# Patient Record
Sex: FEMALE | Race: WHITE | NOT HISPANIC OR LATINO | Employment: OTHER | URBAN - METROPOLITAN AREA
[De-identification: names, ages, dates, MRNs, and addresses within clinical notes are randomized per-mention and may not be internally consistent; named-entity substitution may affect disease eponyms.]

---

## 2017-01-10 ENCOUNTER — OFFICE VISIT (OUTPATIENT)
Dept: OBGYN | Facility: CLINIC | Age: 62
End: 2017-01-10
Payer: COMMERCIAL

## 2017-01-10 VITALS — SYSTOLIC BLOOD PRESSURE: 138 MMHG | DIASTOLIC BLOOD PRESSURE: 96 MMHG

## 2017-01-10 VITALS
WEIGHT: 160 LBS | SYSTOLIC BLOOD PRESSURE: 132 MMHG | HEIGHT: 67 IN | DIASTOLIC BLOOD PRESSURE: 74 MMHG | HEART RATE: 62 BPM | BODY MASS INDEX: 25.11 KG/M2

## 2017-01-10 DIAGNOSIS — Z12.11 SCREEN FOR COLON CANCER: ICD-10-CM

## 2017-01-10 DIAGNOSIS — Z01.419 ENCOUNTER FOR GYNECOLOGICAL EXAMINATION WITHOUT ABNORMAL FINDING: Primary | ICD-10-CM

## 2017-01-10 DIAGNOSIS — Z12.4 SCREENING FOR CERVICAL CANCER: ICD-10-CM

## 2017-01-10 DIAGNOSIS — Z11.51 SCREENING FOR HUMAN PAPILLOMAVIRUS: ICD-10-CM

## 2017-01-10 DIAGNOSIS — Z12.39 SCREENING FOR BREAST CANCER: ICD-10-CM

## 2017-01-10 PROCEDURE — G0145 SCR C/V CYTO,THINLAYER,RESCR: HCPCS | Performed by: OBSTETRICS & GYNECOLOGY

## 2017-01-10 PROCEDURE — 99386 PREV VISIT NEW AGE 40-64: CPT | Performed by: OBSTETRICS & GYNECOLOGY

## 2017-01-10 PROCEDURE — 87624 HPV HI-RISK TYP POOLED RSLT: CPT | Performed by: OBSTETRICS & GYNECOLOGY

## 2017-01-10 RX ORDER — TEMAZEPAM 15 MG/1
CAPSULE ORAL
COMMUNITY
End: 2022-05-18

## 2017-01-10 RX ORDER — ZIPRASIDONE HYDROCHLORIDE 20 MG/1
20 CAPSULE ORAL 2 TIMES DAILY WITH MEALS
COMMUNITY
End: 2022-05-18

## 2017-01-10 RX ORDER — LEVOTHYROXINE SODIUM 75 UG/1
75 TABLET ORAL DAILY
COMMUNITY
End: 2022-05-18

## 2017-01-10 ASSESSMENT — ANXIETY QUESTIONNAIRES
7. FEELING AFRAID AS IF SOMETHING AWFUL MIGHT HAPPEN: SEVERAL DAYS
2. NOT BEING ABLE TO STOP OR CONTROL WORRYING: NEARLY EVERY DAY
1. FEELING NERVOUS, ANXIOUS, OR ON EDGE: MORE THAN HALF THE DAYS
6. BECOMING EASILY ANNOYED OR IRRITABLE: SEVERAL DAYS
GAD7 TOTAL SCORE: 13
3. WORRYING TOO MUCH ABOUT DIFFERENT THINGS: NEARLY EVERY DAY
IF YOU CHECKED OFF ANY PROBLEMS ON THIS QUESTIONNAIRE, HOW DIFFICULT HAVE THESE PROBLEMS MADE IT FOR YOU TO DO YOUR WORK, TAKE CARE OF THINGS AT HOME, OR GET ALONG WITH OTHER PEOPLE: SOMEWHAT DIFFICULT
5. BEING SO RESTLESS THAT IT IS HARD TO SIT STILL: SEVERAL DAYS

## 2017-01-10 ASSESSMENT — PATIENT HEALTH QUESTIONNAIRE - PHQ9: 5. POOR APPETITE OR OVEREATING: MORE THAN HALF THE DAYS

## 2017-01-10 NOTE — Clinical Note
[facility]  [address]  [place] [address]  [phone]      January 18, 2017    Sonny Rush  [address]  [place] [address]    Dear Sonny,  We are happy to inform you that your PAP smear result from 1/10/17 is normal.  We are now able to do a follow up test on PAP smears. The DNA test is for HPV (Human Papilloma Virus). Cervical cancer is closely linked with certain types of HPV. Your result showed no evidence of high risk HPV. Therefore we recommend repeating your next pap smear in 5 years.  You will still need to return to the clinic every year for an annual exam and other preventive tests.  Please contact the clinic with any questions.  Sincerely,  Daniela Rodriguez MD

## 2017-01-10 NOTE — MR AVS SNAPSHOT
After Visit Summary   1/10/2017    Sonny Rush    MRN: [id number]           Patient Information     Date Of Birth          1955        Visit Information        Provider Department      1/10/2017 11:00 AM Daniela Rodriguez MD [facility]        Today's Diagnoses     Encounter for gynecological examination without abnormal finding [Z01.419]    -  1     Screening for human papillomavirus         Screening for cervical cancer         Screening for breast cancer         Screen for colon cancer            Follow-ups after your visit        Additional Services     GASTROENTEROLOGY ADULT REFERRAL  (Colonoscopy)       Your provider has referred you to Gastroenterology Services.    English    Procedure/Referral: PROCEDURE ONLY - COLONOSCOPY - Reason for procedure: Screening  [facility]: [facility]  (all patients will be contacted by GI  to schedule appointment - This includes [facility], [facility] and [facility]) - Agustina [phone]   [email or web address]      Please be aware that coverage of these services is subject to the terms and limitations of your health insurance plan.  Call member services at your health plan with any benefit or coverage questions.  Any procedures must be performed at a [facility] facility OR coordinated by your clinic's referral office.    Please bring the following with you to your appointment:    (1) Any X-Rays, CTs or MRIs which have been performed.  Contact the facility where they were done to arrange for  prior to your scheduled appointment.    (2) List of current medications   (3) This referral request   (4) Any documents/labs given to you for this referral            RADIOLOGY REFERRAL ([facility])       You have been referred to [facility] for Mammogram (Screening) Imaging.  They are located across the [place]  "from the [facility] (same building).  [address]  Phone: [phone].                  Follow-up notes from your care team     Return in about 1 year (around 1/10/2018).      Who to contact     If you have questions or need follow up information about today's clinic visit or your schedule please contact [facility] directly at [phone].  Normal or non-critical lab and imaging results will be communicated to you by [facility], letter or phone within 4 business days after the clinic has received the results. If you do not hear from us within 7 days, please contact the clinic through [facility] or phone. If you have a critical or abnormal lab result, we will notify you by phone as soon as possible.  Submit refill requests through [facility] or call your pharmacy and they will forward the refill request to us. Please allow 3 business days for your refill to be completed.          Additional Information About Your Visit        Miso Information     Miso lets you send messages to your doctor, view your test results, renew your prescriptions, schedule appointments and more. To sign up, go to [email or web address] . Click on \"Log in\" on the left side of the screen, which will take you to the Welcome page. Then click on \"Sign up Now\" on the right side of the page.     You will be asked to enter the access code listed below, as well as some personal information. Please follow the directions to create your username and password.     Your access code is: [email or web address]  Expires: 4/10/2017 12:01 PM     Your access code will  in 90 days. If you need help or a new code, please call your [facility] clinic or [phone].        Care EveryWhere ID     This is your Care EveryWhere ID. This could be used by other organizations to access your [facility] medical records  [id number]         Blood Pressure from Last 3 Encounters:   01/10/17 138/96   07/10/13 132/74   12 " 119/76    Weight from Last 3 Encounters:   07/10/13 160 lb (72.576 kg)   07/03/12 150 lb (68.04 kg)              We Performed the Following     GASTROENTEROLOGY ADULT REFERRAL  (Colonoscopy)     HPV High Risk Types DNA Cervical     Pap imaged thin layer screen with HPV - recommended age 30 - 65     RADIOLOGY REFERRAL ([facility])          Today's Medication Changes          These changes are accurate as of: 1/10/17 12:01 PM.  If you have any questions, ask your nurse or doctor.               These medicines have changed or have updated prescriptions.        Dose/Directions    VERAPAMIL HCL PO   This may have changed:  Another medication with the same name was removed. Continue taking this medication, and follow the directions you see here.        Dose:  40 mg   Take 40 mg by mouth 2 times daily   Refills:  0         Stop taking these medicines if you haven't already. Please contact your care team if you have questions.     acetaminophen 325 MG tablet   Commonly known as:  TYLENOL   Stopped by:  Daniela Rodriguez MD           HYDROcodone-acetaminophen 5-325 MG per tablet   Commonly known as:  NORCO   Stopped by:  Daniela Rodriguez MD           ibuprofen 200 MG tablet   Commonly known as:  ADVIL/MOTRIN   Stopped by:  Daniela Rodriguez MD           ONDANSETRON PO   Stopped by:  Daniela Rodriguez MD                    Primary Care Provider Office Phone # Fax #    Patricio York [phone]       [facility] [address] [facility] [address]  [facility] [address]        Thank you!     Thank you for choosing [facility]  for your care. Our goal is always to provide you with excellent care. Hearing back from our patients is one way we can continue to improve our services. Please take a few minutes to complete the written survey that you may receive in the mail after your visit with us. Thank you!             Your Updated Medication List - Protect others around you: Learn how to safely  use, store and throw away your medicines at www.disposemymeds.org.          This list is accurate as of: 1/10/17 12:01 PM.  Always use your most recent med list.                   Brand Name Dispense Instructions for use    ANTI-NAUSEA PO          ATIVAN PO      Take 0.5 mg by mouth 3 times daily as needed.       levothyroxine 75 MCG tablet    SYNTHROID/LEVOTHROID     Take 75 mcg by mouth daily       MAXALT-MLT PO      Take 10 mg by mouth as needed.       temazepam 15 MG capsule    RESTORIL     Take by mouth nightly as needed for sleep       TOPAMAX PO      Take 100 mg by mouth 2 times daily.       TRINTELLIX PO      Take 25 mg by mouth       VALTREX PO      Take 500 mg by mouth.       VERAPAMIL HCL PO      Take 40 mg by mouth 2 times daily       VISTARIL PO      Take 25 mg by mouth as needed       WELLBUTRIN PO      Take 150 mg by mouth daily 300mgER+ 150mgER       ziprasidone 20 MG capsule    GEODON     Take 20 mg by mouth 2 times daily (with meals)

## 2017-01-10 NOTE — PROGRESS NOTES
Sonny is a 61 year old  female who presents for annual exam.     Besides routine health maintenance, she has no other health concerns today .    HPI:  The patient's PCP is Patricio York MD.    Patient has history of right breast cancer  DCIS   Had lumpectomy and radiation    Declined tamoxifen  Had normal mammo in sept this years    Dealing with serious depression, has psychiatrist, on several meds   is supportive  Former patient of Dr Villavicencio      GYNECOLOGIC HISTORY:    No LMP recorded. Patient is postmenopausal.  Her current contraception method is: menopause.  She  reports that she quit smoking about 13 years ago. She does not have any smokeless tobacco history on file.    Patient is not sexually active.  STD testing offered?  Declined  Last PHQ-9 score on record =   PHQ-9 SCORE 1/10/2017   Total Score 23     Last GAD7 score on record =   SAÚL-7 SCORE 1/10/2017   Total Score 13     Alcohol Score = 4    HEALTH MAINTENANCE:  Cholesterol: 13   Total= 165, Triglycerides=48, HDL=63, LDL=92  Last Mammo: 16, Result: normal, Next Mammo: 2017  Pap: 12 neg, HPV-  Colonoscopy:  3/5/07, Result: normal, Next Colonoscopy: due this year. Will send referral.  Dexa:  4/3/08    Health maintenance updated:  yes    HISTORY:  Obstetric History       T0      TAB2   SAB0   E0   M0   L0       # Outcome Date GA Lbr Sumeet/2nd Weight Sex Delivery Anes PTL Lv   2 TAB            1 TAB                   There is no problem list on file for this patient.    Past Surgical History   Procedure Laterality Date     Ent surgery       ear tubes, rhinoplasty     Laparoscopy       Age 27 for pelvic pain     Orthopedic surgery       [facility]     Biopsy breast seed localization  7/3/2012     Procedure: BIOPSY BREAST SEED LOCALIZATION;  RIGHT BREAST LUMPECTOMY WITH ULTRASOUND SEED LOCALIZATION;  Surgeon: Jaclyn Dalal MD;  Location:  [facility]     Breast biopsy, rt/lt  00     stereotactic bx right  breast--fibrocystic change     Dilation and curettage  2/27/06     inactive endometrium on path; done for PMB      Social History   Substance Use Topics     Smoking status: Former Smoker     Quit date: 07/03/2003     Smokeless tobacco: Not on file     Alcohol Use: 0.0 oz/week     0 Standard drinks or equivalent per week      Problem (# of Occurrences) Relation (Name,Age of Onset)    Breast Cancer (1) Unspecified    Hyperlipidemia (1) Father    Hypertension (1) Father    Ovarian Cancer (1) Unspecified    Uterine Cancer (1) Unspecified            Current Outpatient Prescriptions   Medication Sig     ziprasidone (GEODON) 20 MG capsule Take 20 mg by mouth 2 times daily (with meals)     temazepam (RESTORIL) 15 MG capsule Take by mouth nightly as needed for sleep     levothyroxine (SYNTHROID/LEVOTHROID) 75 MCG tablet Take 75 mcg by mouth daily     Vortioxetine HBr (TRINTELLIX PO) Take 25 mg by mouth     Fructose-Dextrose-Phosphor Acd (ANTI-NAUSEA PO)      HydrOXYzine Pamoate (VISTARIL PO) Take 25 mg by mouth as needed      BuPROPion HCl (WELLBUTRIN PO) Take 150 mg by mouth daily 300mgER+ 150mgER     LORazepam (ATIVAN PO) Take 0.5 mg by mouth 3 times daily as needed.       Topiramate (TOPAMAX PO) Take 100 mg by mouth 2 times daily.       Rizatriptan Benzoate (MAXALT-MLT PO) Take 10 mg by mouth as needed.       ValACYclovir HCl (VALTREX PO) Take 500 mg by mouth.       VERAPAMIL HCL PO Take 40 mg by mouth 2 times daily      No current facility-administered medications for this visit.     Allergies   Allergen Reactions     Codeine Sulfate      Depakote      Niacin      Prednisone        Past medical, surgical, social and family histories were reviewed and updated in EPIC.    ROS:   12 point review of systems negative other than symptoms noted below.  Constitutional: Fatigue  Eyes: Spots  Head: Earache, Nasal Congestion and Ringing  Cardiovascular: Chest Pain  Gastrointestinal: Abdominal Pain, Bloating, Constipation, Diarrhea,  Nausea and Vomiting  Genitourinary: No Periods, Painful Urination and Vaginal Dryness  Skin: Skin Dryness  Neurologic: Dizziness and Headaches  Endocrine: Cold Intolerance and Decreased Libido  Psychiatric: Anxiety, Depression and Difficulty Sleeping    EXAM:  /96 mmHg   BMI: There is no weight on file to calculate BMI. Patient declined.    PHYSICAL EXAM:  Constitutional:  Appearance: Well nourished, well developed, alert, in no acute distress  Neck:  Lymph Nodes:  No lymphadenopathy present    Thyroid:  Gland size normal, nontender, no nodules or masses present  on palpation  Chest:  Respiratory Effort:  Breathing unlabored  Cardiovascular:    Heart: Auscultation:  Regular rate, normal rhythm, no murmurs present  Breasts: Inspection of Breasts:  No lymphadenopathy present    Palpation of Breasts and Axillae:  No masses present on palpation, no  breast tenderness    Axillary Lymph Nodes:  No lymphadenopathy present  Gastrointestinal:   Abdominal Examination:  Abdomen nontender to palpation, tone normal without rigidity or guarding, no masses present, umbilicus without lesions   Liver and Spleen:  No hepatomegaly present, liver nontender to palpation    Hernias:  No hernias present  Lymphatic: Lymph Nodes:  No other lymphadenopathy present  Skin:  General Inspection:  No rashes present, no lesions present, no areas of  discoloration    Genitalia and Groin:  No rashes present, no lesions present, no areas of  discoloration, no masses present  Neurologic/Psychiatric:    Mental Status:  Oriented X3     Pelvic Exam:  External Genitalia:     Normal appearance for age, no discharge present, no tenderness present, no inflammatory lesions present, color normal  Vagina:     Normal vaginal vault without central or paravaginal defects, no discharge present, no inflammatory lesions present, no masses present  Bladder:     Nontender to palpation  Urethra:   Urethral Body:  Urethra palpation normal, urethra structural  support normal   Urethral Meatus:  No erythema or lesions present  Cervix:     Appearance healthy, no lesions present, nontender to palpation, no bleeding present  Uterus:     Nontender to palpation, no masses present, position anteflexed, mobility: normal  Adnexa:     No adnexal tenderness present, no adnexal masses present  Perineum:     Perineum within normal limits, no evidence of trauma, no rashes or skin lesions present  Anus:     Anus within normal limits, no hemorrhoids present  Inguinal Lymph Nodes:     No lymphadenopathy present  Pubic Hair:     Normal pubic hair distribution for age  Genitalia and Groin:     No rashes present, no lesions present, no areas of discoloration, no masses present    COUNSELING:   Reviewed preventive health counseling, as reflected in patient instructions       Regular exercise       Healthy diet/nutrition    BMI: There is no weight on file to calculate BMI. Patient declined.      ASSESSMENT:  61 year old female with satisfactory annual exam.    ICD-10-CM    1. Encounter for gynecological examination without abnormal finding [Z01.419] Z01.419    2. Screening for human papillomavirus Z11.51 HPV High Risk Types DNA Cervical   3. Screening for cervical cancer Z12.4 Pap imaged thin layer screen with HPV - recommended age 30 - 65   4. Screening for breast cancer Z12.39 RADIOLOGY REFERRAL ([facility])   5. Screen for colon cancer Z12.11 GASTROENTEROLOGY ADULT REFERRAL  (Colonoscopy)       PLAN:  Needs colonoscopy  Mammogram next sept      Daniela Rodriguez MD

## 2017-01-11 ASSESSMENT — PATIENT HEALTH QUESTIONNAIRE - PHQ9: SUM OF ALL RESPONSES TO PHQ QUESTIONS 1-9: 23

## 2017-01-11 ASSESSMENT — ANXIETY QUESTIONNAIRES: GAD7 TOTAL SCORE: 13

## 2017-01-13 LAB
COPATH REPORT: NORMAL
PAP: NORMAL

## 2017-01-17 LAB
FINAL DIAGNOSIS: NORMAL
HPV HR 12 DNA CVX QL NAA+PROBE: NEGATIVE
HPV16 DNA SPEC QL NAA+PROBE: NEGATIVE
HPV18 DNA SPEC QL NAA+PROBE: NEGATIVE
SPECIMEN DESCRIPTION: NORMAL

## 2017-01-28 RX ORDER — LIDOCAINE 40 MG/G
CREAM TOPICAL
Status: CANCELLED | OUTPATIENT
Start: 2017-01-28

## 2017-01-28 RX ORDER — ONDANSETRON 2 MG/ML
4 INJECTION INTRAMUSCULAR; INTRAVENOUS
Status: CANCELLED | OUTPATIENT
Start: 2017-01-28

## 2017-01-30 ENCOUNTER — HOSPITAL ENCOUNTER (OUTPATIENT)
Facility: CLINIC | Age: 62
Discharge: HOME | End: 2017-01-30
Attending: COLON & RECTAL SURGERY | Admitting: COLON & RECTAL SURGERY
Payer: COMMERCIAL

## 2017-01-30 ENCOUNTER — SURGERY (OUTPATIENT)
Age: 62
End: 2017-01-30

## 2017-01-30 VITALS
WEIGHT: 149 LBS | OXYGEN SATURATION: 94 % | RESPIRATION RATE: 21 BRPM | BODY MASS INDEX: 23.95 KG/M2 | DIASTOLIC BLOOD PRESSURE: 91 MMHG | HEIGHT: 66 IN | SYSTOLIC BLOOD PRESSURE: 141 MMHG

## 2017-01-30 LAB — COLONOSCOPY: NORMAL

## 2017-01-30 PROCEDURE — G0500 MOD SEDAT ENDO SERVICE >5YRS: HCPCS | Performed by: COLON & RECTAL SURGERY

## 2017-01-30 PROCEDURE — 99153 MOD SED SAME PHYS/QHP EA: CPT | Performed by: COLON & RECTAL SURGERY

## 2017-01-30 PROCEDURE — 88305 TISSUE EXAM BY PATHOLOGIST: CPT | Performed by: COLON & RECTAL SURGERY

## 2017-01-30 PROCEDURE — 25000125 ZZHC RX 250: Performed by: COLON & RECTAL SURGERY

## 2017-01-30 PROCEDURE — 88305 TISSUE EXAM BY PATHOLOGIST: CPT | Mod: 26 | Performed by: COLON & RECTAL SURGERY

## 2017-01-30 PROCEDURE — 45380 COLONOSCOPY AND BIOPSY: CPT | Mod: PT | Performed by: COLON & RECTAL SURGERY

## 2017-01-30 RX ORDER — FENTANYL CITRATE 50 UG/ML
INJECTION, SOLUTION INTRAMUSCULAR; INTRAVENOUS PRN
Status: DISCONTINUED | OUTPATIENT
Start: 2017-01-30 | End: 2017-01-30 | Stop reason: HOSPADM

## 2017-01-30 RX ADMIN — FENTANYL CITRATE 100 MCG: 50 INJECTION, SOLUTION INTRAMUSCULAR; INTRAVENOUS at 08:20

## 2017-01-30 RX ADMIN — MIDAZOLAM HYDROCHLORIDE 1 MG: 1 INJECTION, SOLUTION INTRAMUSCULAR; INTRAVENOUS at 08:26

## 2017-01-30 RX ADMIN — MIDAZOLAM HYDROCHLORIDE 2 MG: 1 INJECTION, SOLUTION INTRAMUSCULAR; INTRAVENOUS at 08:20

## 2017-01-30 NOTE — H&P
Colon & Rectal Surgery History and Physical  Pre-Endoscopy Procedure Note    History of Present Illness   I have been asked by Dr. Rodriguez to evaluate this 61 year old female for colorectal cancer screening. She denies any abdominal pain, weight loss, bleeding per rectum, or recent change in bowel habits.    Past Medical History  Diagnosis Date     Anxiety      Migraines      ON NEURONTIN, sees neurologist     Malignant neoplasm (H)      Meniere's disease      Mild depression      PMB (postmenopausal bleeding) 2006     Had  D & C 2/06 showing inactive endometrium       Past Surgical History  Procedure Laterality Date     ENT surgery       ear tubes, rhinoplasty     Laparoscopy       Age 27 for pelvic pain     Orthopedic surgery       hammertoe     Biopsy breast seed localization  7/3/2012     Procedure: BIOPSY BREAST SEED LOCALIZATION;  RIGHT BREAST LUMPECTOMY WITH ULTRASOUND SEED LOCALIZATION;  Surgeon: Jaclyn Dalal MD;  Location: [facility]     Breast biopsy  11/29/00     stereotactic bx right breast--fibrocystic change     Dilation and curettage  2/27/06     inactive endometrium on path; done for PMB        Medications  Medication Sig     ziprasidone (GEODON) 20 MG capsule Take 20 mg by mouth 2 times daily (with meals)     temazepam (RESTORIL) 15 MG capsule Take by mouth nightly as needed for sleep     levothyroxine (SYNTHROID/LEVOTHROID) 75 MCG tablet Take 75 mcg by mouth daily     Vortioxetine HBr (TRINTELLIX PO) Take 25 mg by mouth     HydrOXYzine Pamoate (VISTARIL PO) Take 25 mg by mouth as needed      Topiramate (TOPAMAX PO) Take 100 mg by mouth 2 times daily.       ValACYclovir HCl (VALTREX PO) Take 500 mg by mouth.       VERAPAMIL HCL PO Take 40 mg by mouth 2 times daily      Fructose-Dextrose-Phosphor Acd (ANTI-NAUSEA PO)      BuPROPion HCl (WELLBUTRIN PO) Take 150 mg by mouth daily 300mgER+ 150mgER     LORazepam (ATIVAN PO) Take 0.5 mg by mouth 3 times daily as needed.       Rizatriptan Benzoate  "(MAXALT-MLT PO) Take 10 mg by mouth as needed.         Allergies  Allergen Reactions     Codeine Sulfate      Depakote      Niacin      Prednisone         Family History   Family history includes Breast Cancer in another family member; Hyperlipidemia and Hypertension in her father; Ovarian Cancer in another family member; Uterine Cancer in another family member.     Social History    She reports that she quit smoking about 13 years ago. She does not have any smokeless tobacco history on file. She reports that she drinks alcohol. She reports that she does not use illicit drugs.    Review of Systems   Constitutional:  No fever, weight change or fatigue.    Eyes:     No dry eyes or vision changes.   Ears/Nose/Throat/Neck:  No oral ulcers, sore throat or voice change.    Cardiovascular:   No palpitations, syncope, angina or edema.   Respiratory:    No chest pain, excessive sleepiness, shortness of breath or hemoptysis.    Gastrointestinal:   No abdominal pain, nausea, vomiting, diarrhea or heartburn.    Genitourinary:   No dysuria, hematuria, urinary retention or urinary frequency.   Musculoskeletal:  No joint swelling or arthralgias.    Dermatologic:  No skin rash or other skin changes.   Neurologic:    No focal weakness or numbness. No neuropathy.   Psychiatric:    No depression, anxiety, suicidal ideation, or paranoid ideation.   Endocrine:   No cold or heat intolerance, polydipsia, hirsutism, change in libido, or flushing.   Hematology/Lymphatic:  No bleeding or lymphadenopathy.    Allergy/Immunology:  No rhinitis or hives.     Physical Exam   Vitals:  /86, RR 16, HR 64, height 1.676 m (5' 6\"), weight 67.586 kg (149 lb), SpO2 98 %.    General:  Alert and oriented to person, place and time   Airway: Normal oropharyngeal airway and neck mobility   Lungs:  Clear bilaterally   Heart:  Regular rate and rhythm   Abdomen: Soft, NT, ND, no masses   Rectal:  Perianal skin without excoriation, hemorrhoidal disease or " anal fissure        Digital rectal examination reveals normal sphincter tone without masses    ASA Grade: II (mild systemic disease)    Impression: Cleared for use of conscious sedation for colorectal cancer screening    Plan: Proceed with colonoscopy     Muriel Wolff MD  [facility]  [phone]

## 2017-01-31 LAB — COPATH REPORT: NORMAL

## 2017-09-20 ENCOUNTER — TRANSFERRED RECORDS (OUTPATIENT)
Dept: HEALTH INFORMATION MANAGEMENT | Facility: CLINIC | Age: 62
End: 2017-09-20

## 2019-02-10 ENCOUNTER — ANCILLARY PROCEDURE (OUTPATIENT)
Dept: GENERAL RADIOLOGY | Facility: CLINIC | Age: 64
End: 2019-02-10
Attending: PHYSICIAN ASSISTANT
Payer: COMMERCIAL

## 2019-02-10 ENCOUNTER — OFFICE VISIT (OUTPATIENT)
Dept: URGENT CARE | Facility: URGENT CARE | Age: 64
End: 2019-02-10
Payer: COMMERCIAL

## 2019-02-10 VITALS
HEIGHT: 67 IN | BODY MASS INDEX: 24.8 KG/M2 | RESPIRATION RATE: 14 BRPM | SYSTOLIC BLOOD PRESSURE: 146 MMHG | TEMPERATURE: 97.6 F | WEIGHT: 158 LBS | DIASTOLIC BLOOD PRESSURE: 100 MMHG | HEART RATE: 80 BPM

## 2019-02-10 DIAGNOSIS — S92.114A CLOSED NONDISPLACED FRACTURE OF NECK OF RIGHT TALUS, INITIAL ENCOUNTER: Primary | ICD-10-CM

## 2019-02-10 DIAGNOSIS — M79.671 RIGHT FOOT PAIN: ICD-10-CM

## 2019-02-10 PROCEDURE — 99203 OFFICE O/P NEW LOW 30 MIN: CPT | Performed by: PHYSICIAN ASSISTANT

## 2019-02-10 PROCEDURE — 73630 X-RAY EXAM OF FOOT: CPT | Mod: RT

## 2019-02-10 PROCEDURE — 73610 X-RAY EXAM OF ANKLE: CPT | Mod: RT

## 2019-02-10 RX ORDER — MIRTAZAPINE 7.5 MG/1
7.5 TABLET, FILM COATED ORAL AT BEDTIME
COMMUNITY
End: 2022-08-05

## 2019-02-10 RX ORDER — ONDANSETRON 4 MG/1
4 TABLET, ORALLY DISINTEGRATING ORAL EVERY 8 HOURS PRN
COMMUNITY
End: 2023-03-17

## 2019-02-10 ASSESSMENT — MIFFLIN-ST. JEOR: SCORE: 1290.34

## 2019-02-11 NOTE — PROGRESS NOTES
SUBJECTIVE:  Chief Complaint   Patient presents with     Urgent Care     Musculoskeletal Problem     injury to right foot/ankle when she missed a step trying to avoid stepping on kitten.      Sonny Rush is a 63 year old female who presents with a chief complaint of right foot and ankle pain and swelling.  Symptoms began 2 day(s) ago, are moderate.  Context:  Injury:Yes: Patient fell down the last couple of steps at her home. She had immediate pain and was unable to bear weight on her foot following the accident.   Pain exacerbated by weight-bearing Relieved by rest and elevation.  She treated it initially with RICE treatment. This is the first time this type of injury has occurred to this patient.   Patient denies numbness, tingling or icy toes  She has had increased swelling into her ankle over the last day or so    Past Medical History:   Diagnosis Date     Anxiety      Malignant neoplasm (H)      Meniere's disease      Migraines     ON NEURONTIN, sees neurologist     Mild depression (H)     sees psych     PMB (postmenopausal bleeding) 2006    Had  D & C 2/06 showing inactive endometrium     Current Outpatient Medications   Medication Sig Dispense Refill     BuPROPion HCl (WELLBUTRIN PO) Take 150 mg by mouth daily 300mgER+ 150mgER       levothyroxine (SYNTHROID/LEVOTHROID) 75 MCG tablet Take 75 mcg by mouth daily       mirtazapine (REMERON) 7.5 MG tablet Take 7.5 mg by mouth At Bedtime       ondansetron (ZOFRAN-ODT) 4 MG ODT tab Take 4 mg by mouth every 8 hours as needed for nausea       order for DME Short walking boot 1 each 0     Topiramate (TOPAMAX PO) Take 100 mg by mouth 2 times daily.         VERAPAMIL HCL PO Take 40 mg by mouth 2 times daily        Fructose-Dextrose-Phosphor Acd (ANTI-NAUSEA PO)        HydrOXYzine Pamoate (VISTARIL PO) Take 25 mg by mouth as needed        LORazepam (ATIVAN PO) Take 0.5 mg by mouth 3 times daily as needed.         Rizatriptan Benzoate (MAXALT-MLT PO) Take 10 mg  "by mouth as needed.         temazepam (RESTORIL) 15 MG capsule Take by mouth nightly as needed for sleep       ValACYclovir HCl (VALTREX PO) Take 500 mg by mouth.         Vortioxetine HBr (TRINTELLIX PO) Take 25 mg by mouth       ziprasidone (GEODON) 20 MG capsule Take 20 mg by mouth 2 times daily (with meals)       Social History     Tobacco Use     Smoking status: Former Smoker     Last attempt to quit: 7/3/2003     Years since quitting: 15.6     Smokeless tobacco: Never Used   Substance Use Topics     Alcohol use: Yes     Alcohol/week: 0.0 oz     Family History   Problem Relation Age of Onset     Breast Cancer Unknown         aunt     Hyperlipidemia Father      Hypertension Father      Ovarian Cancer Unknown      Uterine Cancer Unknown          ROS:  Review of systems negative except as stated above.    EXAM:   BP (!) 146/100   Pulse 80   Temp 97.6  F (36.4  C) (Oral)   Resp 14   Ht 1.695 m (5' 6.75\")   Wt 71.7 kg (158 lb)   BMI 24.93 kg/m    M/S Exam:Right foot has swelling, bruising and erythema. TTP over 2nd / 3rd MT and malleolus B/L.GENERAL APPEARANCE: healthy, alert and no distress  EXTREMITIES: peripheral pulses normal. Neg Homans sign. NO calf swelling  SKIN: no suspicious lesions or rashes  NEURO: Normal strength and tone, sensory exam grossly normal, mentation intact and speech normal    X-RAY was done -- fracture of talus    ASSESSMENT / PLAN:  1. Closed nondisplaced fracture of neck of right talus, initial encounter  RICE treatment encouraged  Continue with Aleve for pain  Follow-up with Ortho in the next week for re-check  - XR Ankle Right G/E 3 Views; Future  - XR Foot Right G/E 3 Views; Future  - order for DME; Short walking boot  Dispense: 1 each; Refill: 0  - ORTHO  REFERRAL    I have discussed the patient's diagnosis and my plan of treatment with the patient. We went over any labs or imaging. Patient is aware to come back in with worsening symptoms or if no relief despite " treatment plan.  Patient verbalizes understanding. All questions were addressed and answered.   Shanti Reina PA-C

## 2019-02-11 NOTE — PATIENT INSTRUCTIONS
Patient Education     Foot Fracture  You have a broken bone (fracture) in your foot. This will cause pain, swelling, and often bruising. It will usually take about 4 to 8 weeks to heal. A foot fracture may be treated with a special shoe, splint, cast, or boot.  Home care  Follow these guidelines when caring for yourself at home:    You may be given a splint, cast, shoe, or boot to keep the injured area from moving. Unless you were told otherwise, use crutches or a walker. Don t put weight on the injured foot until your health care provider says you can do so. (You can rent crutches and a walker at many pharmacies and surgical or orthopedic supply stores.) Don t put weight on a splint, or it will break.    Keep your leg elevated to reduce pain and swelling. When sleeping, put a pillow under the injured leg. When sitting, support the injured leg so it is above your waist. This is very important during the first 2 days (48 hours).    Put an ice pack on the injured area. Do this for 20 minutes every 1 to 2 hours the first day for pain relief. You can make an ice pack by wrapping a plastic bag of ice cubes in a thin towel. As the ice melts, be careful that the splint, cast, boot, or shoe doesn t get wet. You can place the ice pack directly over the splint or cast. Unless told otherwise, you can open the boot or shoe to apply the ice pack. Continue using the ice pack 3 to 4 times a day for the next 2 days. Then use the ice pack as needed to ease pain and swelling.    Keep the splint, cast, boot, or shoe dry. When bathing, protect it with a large plastic bag, rubber-banded at the top end. If a fiberglass splint or cast or boot gets wet, you can dry it with a hair dryer. Unless told otherwise, you can take off the boot or shoe to bathe.    You may use acetaminophen or ibuprofen to control pain, unless another pain medicine was prescribed. If you have chronic liver or kidney disease, talk with your healthcare provider  before using these medicines. Also talk with your provider if you ve had a stomach ulcer or gastrointestinal bleeding.    Don t put creams or objects under the cast if you have itching.  Follow-up care  Follow up with your healthcare provider, or as advised. This is to make sure the bone is healing the way it should. If you were given a splint, it may be changed to a cast or boot at your follow-up visit.  X-rays may be taken. You will be told of any new findings that may affect your care.  When to seek medical advice  Call your healthcare provider right away if any of these occur:    The cast or splint cracks    The plaster cast or splint becomes wet or soft    The fiberglass cast or splint stays wet for more than 24 hours    Bad odor from the cast or wound fluid stains the cast    Tightness or pain under the cast or splint gets worse    Toes become swollen, cold, blue, numb, or tingly    You can t move your toes    Skin around cast or splint becomes red    Fever of 100.4 F (38 C) or higher, or as directed by your healthcare provider  Date Last Reviewed: 2/1/2017 2000-2018 [employer]. [address] [place] [address], [place] [address]. All rights reserved. This information is not intended as a substitute for professional medical care. Always follow your healthcare professional's instructions.

## 2019-02-11 NOTE — PROGRESS NOTES
[facility]   Clinic Visit s Feb 13, 2019    PCP: Patricio York      Sonny is a 63 year old female who is seen as self referral for   Chief Complaint   Patient presents with     Right Ankle - Pain       Injury: Patient describes injury as tripped over her new kitten and fell down a few stairs.        Location of Pain: right ankle and foot medial, nonradiating   Duration of Pain: 5 day(s) 2/8/19  Rating of Pain at worst: 5/10  Rating of Pain Currently: 5/10  Pain is better with: activity avoidance   Pain is worse with: walking and wearing her foot  Treatment so far consists of: boot and rest, aleve  Associated symptoms: swelling Moderate  Recent imaging completed: X-rays completed 2/10/19.  Prior History of related problems: hammer toe surgery right foot    Social History: retired     Past Medical History:   Diagnosis Date     Anxiety      Malignant neoplasm (H)      Meniere's disease      Migraines     ON NEURONTIN, sees neurologist     Mild depression (H)     sees psych     PMB (postmenopausal bleeding) 2006    Had  D & C 2/06 showing inactive endometrium         Family History   Problem Relation Age of Onset     Breast Cancer Unknown         aunt     Hyperlipidemia Father      Hypertension Father      Ovarian Cancer Unknown      Uterine Cancer Unknown        Social History     Socioeconomic History     Marital status:      Spouse name: Virgilio                                              Occupational History     Occupation:      Employer: [employer]   Tobacco Use     Smoking status: Former Smoker     Last attempt to quit: 7/3/2003     Years since quitting: 15.6     Smokeless tobacco: Never Used   Substance and Sexual Activity     Alcohol use: Yes     Alcohol/week: 0.0 oz     Drug use: No       Past Surgical History:   Procedure Laterality Date     BIOPSY BREAST SEED LOCALIZATION  7/3/2012    Procedure: BIOPSY BREAST SEED LOCALIZATION;  RIGHT BREAST LUMPECTOMY WITH  "ULTRASOUND SEED LOCALIZATION;  Surgeon: Jaclyn Dalal MD;  Location:  [facility]     BREAST BIOPSY, RT/LT  11/29/00    stereotactic bx right breast--fibrocystic change     COLONOSCOPY N/A 1/30/2017    Procedure: COMBINED COLONOSCOPY, SINGLE OR MULTIPLE BIOPSY/POLYPECTOMY BY BIOPSY;  Surgeon: Muriel Wolff MD;  Location:  GI     DILATION AND CURETTAGE  2/27/06    inactive endometrium on path; done for PMB     ENT SURGERY      ear tubes, rhinoplasty     LAPAROSCOPY      Age 27 for pelvic pain     ORTHOPEDIC SURGERY      [facility]       Review of Systems   Musculoskeletal: Positive for joint pain.   All other systems reviewed and are negative.        Physical Exam  BP (!) 162/100   Ht 1.695 m (5' 6.75\")   Wt 71.7 kg (158 lb)   BMI 24.93 kg/m    Constitutional:well-developed, well-nourished, and in no distress.   Cardiovascular: Intact distal pulses.    Neurological: alert. Gait Abnormal:   Antalgic gait  Skin: Skin is warm and dry.   Psychiatric: Mood and affect normal.   Respiratory: unlabored, speaks in full sentences  Lymph: no LAD, no lymphangitis            Right Ankle Exam     Tenderness   Right ankle tenderness location: Proximal dorsal foot just distal to tibiotalar junction.    Range of Motion   Dorsiflexion: 0   Plantar flexion: 20   Eversion: 5   Inversion: 10     Tests   Anterior drawer: positive  Varus tilt: positive    Other   Erythema: absent  Sensation: normal  Pulse: present     Comments:  Diffuse mottled bruising and swelling over the foot, extending into the toes.  Good capillary refill, distal pulses intact, toes are warm and well-perfused.  Sensation intact.               X-ray images Previously done and independently reviewed by me in the office today with the patient. X-ray shows:     Recent Results (from the past 744 hour(s))   XR Ankle Right G/E 3 Views    Narrative    FOOT RIGHT THREE OR MORE VIEWS, ANKLE RIGHT THREE OR MORE VIEWS  2/10/2019 7:13 PM     HISTORY: Foot and ankle " pain after an injury.    COMPARISON: None.      Impression    IMPRESSION:     Right ankle: There is a small sliver-like bony fragment projecting  dorsal to the talar neck consistent with a small avulsion fracture. If  the patient has tenderness in this region the fracture is likely  acute. No other acute appearing bony abnormality. Soft tissue swelling  about the ankle.    Right foot: No acute bony abnormality or significant degenerative  changes. Diffuse dorsal midfoot and forefoot soft tissue swelling.    SHA DORSEY MD   XR Foot Right G/E 3 Views    Narrative    FOOT RIGHT THREE OR MORE VIEWS, ANKLE RIGHT THREE OR MORE VIEWS  2/10/2019 7:13 PM     HISTORY: Foot and ankle pain after an injury.    COMPARISON: None.      Impression    IMPRESSION:     Right ankle: There is a small sliver-like bony fragment projecting  dorsal to the talar neck consistent with a small avulsion fracture. If  the patient has tenderness in this region the fracture is likely  acute. No other acute appearing bony abnormality. Soft tissue swelling  about the ankle.    Right foot: No acute bony abnormality or significant degenerative  changes. Diffuse dorsal midfoot and forefoot soft tissue swelling.    SHA DORSEY MD     ASSESSMENT/PLAN    ICD-10-CM    1. Elevated blood pressure reading without diagnosis of hypertension R03.0    2. Closed displaced avulsion fracture of right talus, initial encounter S92.151A      Small, mildly displaced avulsion fracture, anticipate satisfactory healing, encouraged to wear boot for ambulation as needed, elevation and cold packs to reduce swelling for the first week recommended.  Declines pain medication.  Range of motion exercises discussed.  No driving until able to move her foot comfortably.  Recheck with repeat x-rays in 3 weeks      Paddy to follow up with Primary Care provider regarding elevated blood pressure.

## 2019-02-13 ENCOUNTER — OFFICE VISIT (OUTPATIENT)
Dept: ORTHOPEDICS | Facility: CLINIC | Age: 64
End: 2019-02-13
Payer: COMMERCIAL

## 2019-02-13 VITALS
HEIGHT: 67 IN | SYSTOLIC BLOOD PRESSURE: 162 MMHG | BODY MASS INDEX: 24.8 KG/M2 | DIASTOLIC BLOOD PRESSURE: 100 MMHG | WEIGHT: 158 LBS

## 2019-02-13 DIAGNOSIS — S92.151A CLOSED DISPLACED AVULSION FRACTURE OF RIGHT TALUS, INITIAL ENCOUNTER: ICD-10-CM

## 2019-02-13 DIAGNOSIS — R03.0 ELEVATED BLOOD PRESSURE READING WITHOUT DIAGNOSIS OF HYPERTENSION: Primary | ICD-10-CM

## 2019-02-13 PROCEDURE — 99203 OFFICE O/P NEW LOW 30 MIN: CPT | Mod: 57 | Performed by: FAMILY MEDICINE

## 2019-02-13 PROCEDURE — 28430 CLTX TALUS FRACTURE W/O MNPJ: CPT | Mod: RT | Performed by: FAMILY MEDICINE

## 2019-02-13 ASSESSMENT — MIFFLIN-ST. JEOR: SCORE: 1290.34

## 2019-02-13 NOTE — LETTER
2/13/2019         RE: Sonny Rush  [address]  [facility] [address]        Dear Colleague,    Thank you for referring your patient, Sonny Rush, to the  SPORTS MEDICINE. Please see a copy of my visit note below.    [facility]   Clinic Visit s Feb 13, 2019    PCP: Patricio York      Sonny is a 63 year old female who is seen as self referral for   Chief Complaint   Patient presents with     Right Ankle - Pain       Injury: Patient describes injury as tripped over her new kitten and fell down a few stairs.        Location of Pain: right ankle and foot medial, nonradiating   Duration of Pain: 5 day(s) 2/8/19  Rating of Pain at worst: 5/10  Rating of Pain Currently: 5/10  Pain is better with: activity avoidance   Pain is worse with: walking and wearing her foot  Treatment so far consists of: boot and rest, aleve  Associated symptoms: swelling Moderate  Recent imaging completed: X-rays completed 2/10/19.  Prior History of related problems: hammer toe surgery right foot    Social History: retired     Past Medical History:   Diagnosis Date     Anxiety      Malignant neoplasm (H)      Meniere's disease      Migraines     ON NEURONTIN, sees neurologist     Mild depression (H)     sees psych     PMB (postmenopausal bleeding) 2006    Had  D & C 2/06 showing inactive endometrium         Family History   Problem Relation Age of Onset     Breast Cancer Unknown         aunt     Hyperlipidemia Father      Hypertension Father      Ovarian Cancer Unknown      Uterine Cancer Unknown        Social History     Socioeconomic History     Marital status:      Spouse name: Virgilio                                              Occupational History     Occupation:      Employer: [employer]   Tobacco Use     Smoking status: Former Smoker     Last attempt to quit: 7/3/2003     Years since quitting: 15.6     Smokeless tobacco: Never Used   Substance and Sexual  "Activity     Alcohol use: Yes     Alcohol/week: 0.0 oz     Drug use: No       Past Surgical History:   Procedure Laterality Date     BIOPSY BREAST SEED LOCALIZATION  7/3/2012    Procedure: BIOPSY BREAST SEED LOCALIZATION;  RIGHT BREAST LUMPECTOMY WITH ULTRASOUND SEED LOCALIZATION;  Surgeon: Jaclyn Dalal MD;  Location:  [facility]     BREAST BIOPSY, RT/LT  11/29/00    stereotactic bx right breast--fibrocystic change     COLONOSCOPY N/A 1/30/2017    Procedure: COMBINED COLONOSCOPY, SINGLE OR MULTIPLE BIOPSY/POLYPECTOMY BY BIOPSY;  Surgeon: Muriel Wolff MD;  Location:  GI     DILATION AND CURETTAGE  2/27/06    inactive endometrium on path; done for PMB     ENT SURGERY      ear tubes, rhinoplasty     LAPAROSCOPY      Age 27 for pelvic pain     ORTHOPEDIC SURGERY      [facility]       Review of Systems   Musculoskeletal: Positive for joint pain.   All other systems reviewed and are negative.        Physical Exam  BP (!) 162/100   Ht 1.695 m (5' 6.75\")   Wt 71.7 kg (158 lb)   BMI 24.93 kg/m     Constitutional:well-developed, well-nourished, and in no distress.   Cardiovascular: Intact distal pulses.    Neurological: alert. Gait Abnormal:   Antalgic gait  Skin: Skin is warm and dry.   Psychiatric: Mood and affect normal.   Respiratory: unlabored, speaks in full sentences  Lymph: no LAD, no lymphangitis            Right Ankle Exam     Tenderness   Right ankle tenderness location: Proximal dorsal foot just distal to tibiotalar junction.    Range of Motion   Dorsiflexion: 0   Plantar flexion: 20   Eversion: 5   Inversion: 10     Tests   Anterior drawer: positive  Varus tilt: positive    Other   Erythema: absent  Sensation: normal  Pulse: present     Comments:  Diffuse mottled bruising and swelling over the foot, extending into the toes.  Good capillary refill, distal pulses intact, toes are warm and well-perfused.  Sensation intact.               X-ray images Previously done and independently reviewed by me in " the office today with the patient. X-ray shows:     Recent Results (from the past 744 hour(s))   XR Ankle Right G/E 3 Views    Narrative    FOOT RIGHT THREE OR MORE VIEWS, ANKLE RIGHT THREE OR MORE VIEWS  2/10/2019 7:13 PM     HISTORY: Foot and ankle pain after an injury.    COMPARISON: None.      Impression    IMPRESSION:     Right ankle: There is a small sliver-like bony fragment projecting  dorsal to the talar neck consistent with a small avulsion fracture. If  the patient has tenderness in this region the fracture is likely  acute. No other acute appearing bony abnormality. Soft tissue swelling  about the ankle.    Right foot: No acute bony abnormality or significant degenerative  changes. Diffuse dorsal midfoot and forefoot soft tissue swelling.    SHA DORSEY MD   XR Foot Right G/E 3 Views    Narrative    FOOT RIGHT THREE OR MORE VIEWS, ANKLE RIGHT THREE OR MORE VIEWS  2/10/2019 7:13 PM     HISTORY: Foot and ankle pain after an injury.    COMPARISON: None.      Impression    IMPRESSION:     Right ankle: There is a small sliver-like bony fragment projecting  dorsal to the talar neck consistent with a small avulsion fracture. If  the patient has tenderness in this region the fracture is likely  acute. No other acute appearing bony abnormality. Soft tissue swelling  about the ankle.    Right foot: No acute bony abnormality or significant degenerative  changes. Diffuse dorsal midfoot and forefoot soft tissue swelling.    SHA DORSEY MD     ASSESSMENT/PLAN    ICD-10-CM    1. Elevated blood pressure reading without diagnosis of hypertension R03.0    2. Closed displaced avulsion fracture of right talus, initial encounter S92.151A      Small, mildly displaced avulsion fracture, anticipate satisfactory healing, encouraged to wear boot for ambulation as needed, elevation and cold packs to reduce swelling for the first week recommended.  Declines pain medication.  Range of motion exercises discussed.  No driving  until able to move her foot comfortably.  Recheck with repeat x-rays in 3 weeks      Paddy to follow up with Primary Care provider regarding elevated blood pressure.    Again, thank you for allowing me to participate in the care of your patient.        Sincerely,        Warren Herrera MD

## 2019-03-04 ENCOUNTER — ANCILLARY PROCEDURE (OUTPATIENT)
Dept: GENERAL RADIOLOGY | Facility: CLINIC | Age: 64
End: 2019-03-04
Attending: FAMILY MEDICINE
Payer: COMMERCIAL

## 2019-03-04 ENCOUNTER — OFFICE VISIT (OUTPATIENT)
Dept: ORTHOPEDICS | Facility: CLINIC | Age: 64
End: 2019-03-04
Payer: COMMERCIAL

## 2019-03-04 VITALS
HEIGHT: 67 IN | SYSTOLIC BLOOD PRESSURE: 152 MMHG | DIASTOLIC BLOOD PRESSURE: 97 MMHG | BODY MASS INDEX: 24.8 KG/M2 | WEIGHT: 158 LBS

## 2019-03-04 DIAGNOSIS — S92.151A CLOSED DISPLACED AVULSION FRACTURE OF RIGHT TALUS, INITIAL ENCOUNTER: Primary | ICD-10-CM

## 2019-03-04 PROCEDURE — 99207 ZZC FRACTURE CARE IN GLOBAL PERIOD: CPT | Performed by: FAMILY MEDICINE

## 2019-03-04 PROCEDURE — 73610 X-RAY EXAM OF ANKLE: CPT | Mod: RT | Performed by: FAMILY MEDICINE

## 2019-03-04 ASSESSMENT — MIFFLIN-ST. JEOR: SCORE: 1290.34

## 2019-03-04 NOTE — LETTER
3/4/2019         RE: Sonny Rush  [address]  [facility] [address]        Dear Colleague,    Thank you for referring your patient, Sonny Rush, to the  SPORTS MEDICINE. Please see a copy of my visit note below.    HPI   [facility]   Clinic Visit s Mar 4, 2019    PCP: Patricio York    Subjective:  Sonny Rush is a 63 year old female who is seen today for follow up of Closed displaced avulsion fracture of right talus, initial encounter. Injury occurred on February / 08 / 2019, (3  week(s) ago); her last visit was 2/13/2019.  She has been in a full leg walking boot and also a compression sleeve. She reports wearing the boot on and off since last visit.  Sonny Rush is alone today     Patient has swelling that worsens with physical activity; denies paresthesias, or weakness. Patient did express concern about healing thus far.   Patient's past medical, surgical, social and family histories are reviewed today in the medical record.    History from previous visit on 2/13/2019  Injury: Patient describes injury as tripped over her new kitten and fell down a few stairs.        Location of Pain: right ankle and foot medial, nonradiating   Duration of Pain: 5 day(s) 2/8/19  Rating of Pain at worst: 5/10  Rating of Pain Currently: 5/10  Pain is better with: activity avoidance   Pain is worse with: walking and wearing her foot  Treatment so far consists of: boot and rest, aleve  Associated symptoms: swelling Moderate  Recent imaging completed: X-rays completed 2/10/19.  Prior History of related problems: hammer toe surgery right foot    Social History: retired     Past Medical History:   Diagnosis Date     Anxiety      Malignant neoplasm (H)      Meniere's disease      Migraines     ON NEURONTIN, sees neurologist     Mild depression (H)     sees psych     PMB (postmenopausal bleeding) 2006    Had  D & C 2/06 showing inactive endometrium  "        Family History   Problem Relation Age of Onset     Breast Cancer Unknown         aunt     Hyperlipidemia Father      Hypertension Father      Ovarian Cancer Unknown      Uterine Cancer Unknown        Social History     Socioeconomic History     Marital status:      Spouse name: Virgilio                                              Occupational History     Occupation:      Employer: [employer]   Tobacco Use     Smoking status: Former Smoker     Last attempt to quit: 7/3/2003     Years since quitting: 15.6     Smokeless tobacco: Never Used   Substance and Sexual Activity     Alcohol use: Yes     Alcohol/week: 0.0 oz     Drug use: No       Past Surgical History:   Procedure Laterality Date     BIOPSY BREAST SEED LOCALIZATION  7/3/2012    Procedure: BIOPSY BREAST SEED LOCALIZATION;  RIGHT BREAST LUMPECTOMY WITH ULTRASOUND SEED LOCALIZATION;  Surgeon: Jaclyn Dalal MD;  Location:  [facility]     BREAST BIOPSY, RT/LT  11/29/00    stereotactic bx right breast--fibrocystic change     COLONOSCOPY N/A 1/30/2017    Procedure: COMBINED COLONOSCOPY, SINGLE OR MULTIPLE BIOPSY/POLYPECTOMY BY BIOPSY;  Surgeon: Muriel Wolff MD;  Location:  GI     DILATION AND CURETTAGE  2/27/06    inactive endometrium on path; done for PMB     ENT SURGERY      ear tubes, rhinoplasty     LAPAROSCOPY      Age 27 for pelvic pain     ORTHOPEDIC SURGERY      [facility]       Review of Systems   Musculoskeletal: Positive for joint pain.   All other systems reviewed and are negative.        Physical Exam  BP (!) 152/97   Ht 1.695 m (5' 6.75\")   Wt 71.7 kg (158 lb)   BMI 24.93 kg/m     Constitutional:well-developed, well-nourished, and in no distress.   Cardiovascular: Intact distal pulses.    Neurological: alert. Gait Abnormal:   Antalgic gait  Skin: Skin is warm and dry.   Psychiatric: Mood and affect normal.   Respiratory: unlabored, speaks in full sentences  Lymph: no LAD, no lymphangitis            Right Ankle Exam "     Tenderness   Right ankle tenderness location: Proximal dorsal foot just distal to tibiotalar junction.  Swelling: mild    Range of Motion   Dorsiflexion: 10   Plantar flexion: 20   Eversion: 5   Inversion: 10     Muscle Strength   Dorsiflexion:  5/5  Plantar flexion:  5/5    Tests   Anterior drawer: positive  Varus tilt: positive    Other   Erythema: absent  Sensation: normal  Pulse: present     Comments:  Improved but persistent swelling.               X-ray images repeated today and independently reviewed by me in the office today with the patient. X-ray shows:     Recent Results (from the past 744 hour(s))   XR Ankle Right G/E 3 Views    Narrative    FOOT RIGHT THREE OR MORE VIEWS, ANKLE RIGHT THREE OR MORE VIEWS  2/10/2019 7:13 PM     HISTORY: Foot and ankle pain after an injury.    COMPARISON: None.      Impression    IMPRESSION:     Right ankle: There is a small sliver-like bony fragment projecting  dorsal to the talar neck consistent with a small avulsion fracture. If  the patient has tenderness in this region the fracture is likely  acute. No other acute appearing bony abnormality. Soft tissue swelling  about the ankle.    Right foot: No acute bony abnormality or significant degenerative  changes. Diffuse dorsal midfoot and forefoot soft tissue swelling.    SHA DORSEY MD   XR Foot Right G/E 3 Views    Narrative    FOOT RIGHT THREE OR MORE VIEWS, ANKLE RIGHT THREE OR MORE VIEWS  2/10/2019 7:13 PM     HISTORY: Foot and ankle pain after an injury.    COMPARISON: None.      Impression    IMPRESSION:     Right ankle: There is a small sliver-like bony fragment projecting  dorsal to the talar neck consistent with a small avulsion fracture. If  the patient has tenderness in this region the fracture is likely  acute. No other acute appearing bony abnormality. Soft tissue swelling  about the ankle.    Right foot: No acute bony abnormality or significant degenerative  changes. Diffuse dorsal midfoot and  forefoot soft tissue swelling.    SHA DORSEY MD   XR Ankle Right G/E 3 Views    Narrative    3/4/2019    Unchanged talar neck avulsion fracture with persistent though mildly   improved soft tissue swelling also present.  No new fractures.  Mortise is   intact.     ASSESSMENT/PLAN    ICD-10-CM    1. Closed displaced avulsion fracture of right talus, initial encounter S92.151A XR Ankle Right G/E 3 Views     Persistent pain, mildly improved, slightly improved swelling, encouraged her to use the boot more regularly for pain control.  X-rays noted as essentially unchanged though with less swelling, and patient is cautioned that this can be permanent in its appearance but evulsion can still heal quite well.  Reinforced use of boot to keep foot at 90 degrees of flexion at the ankle.  Encouraged cold packs 10-15 minutes 3 times daily.  Recheck in 2-3 weeks.  X-ray does not need to be repeated unless symptoms worsen  Paddy to follow up with Primary Care provider regarding elevated blood pressure.    Again, thank you for allowing me to participate in the care of your patient.        Sincerely,        Warren Herrera MD

## 2020-01-21 NOTE — NURSING NOTE
Paddy to follow up with Primary Care provider regarding elevated blood pressure.     Finasteride Counseling:  I discussed with the patient the risks of use of finasteride including but not limited to decreased libido, decreased ejaculate volume, gynecomastia, and depression. Women should not handle medication.  All of the patient's questions and concerns were addressed. Finasteride Male Counseling: Finasteride Counseling:  I discussed with the patient the risks of use of finasteride including but not limited to decreased libido, decreased ejaculate volume, gynecomastia, and depression. Women should not handle medication.  All of the patient's questions and concerns were addressed.

## 2022-03-30 ENCOUNTER — TRANSFERRED RECORDS (OUTPATIENT)
Dept: HEALTH INFORMATION MANAGEMENT | Facility: CLINIC | Age: 67
End: 2022-03-30

## 2022-03-30 ENCOUNTER — MEDICAL CORRESPONDENCE (OUTPATIENT)
Dept: HEALTH INFORMATION MANAGEMENT | Facility: CLINIC | Age: 67
End: 2022-03-30
Payer: COMMERCIAL

## 2022-03-30 DIAGNOSIS — I10 UNCONTROLLED HYPERTENSION: ICD-10-CM

## 2022-03-30 DIAGNOSIS — R00.2 PALPITATIONS: Primary | ICD-10-CM

## 2022-05-11 ENCOUNTER — HOSPITAL ENCOUNTER (OUTPATIENT)
Dept: CARDIOLOGY | Facility: CLINIC | Age: 67
Discharge: HOME | End: 2022-05-11
Attending: PHYSICIAN ASSISTANT | Admitting: PHYSICIAN ASSISTANT
Payer: COMMERCIAL

## 2022-05-11 DIAGNOSIS — R00.2 PALPITATIONS: ICD-10-CM

## 2022-05-11 DIAGNOSIS — I10 UNCONTROLLED HYPERTENSION: ICD-10-CM

## 2022-05-11 LAB — LVEF ECHO: NORMAL

## 2022-05-11 PROCEDURE — 93306 TTE W/DOPPLER COMPLETE: CPT | Mod: 26 | Performed by: INTERNAL MEDICINE

## 2022-05-11 PROCEDURE — 255N000002 HC RX 255 OP 636: Performed by: INTERNAL MEDICINE

## 2022-05-11 PROCEDURE — 999N000208 ECHOCARDIOGRAM COMPLETE

## 2022-05-11 RX ADMIN — HUMAN ALBUMIN MICROSPHERES AND PERFLUTREN 3 ML: 10; .22 INJECTION, SOLUTION INTRAVENOUS at 08:25

## 2022-05-18 ENCOUNTER — OFFICE VISIT (OUTPATIENT)
Dept: PHARMACY | Facility: PHYSICIAN GROUP | Age: 67
End: 2022-05-18
Payer: COMMERCIAL

## 2022-05-18 ENCOUNTER — TRANSFERRED RECORDS (OUTPATIENT)
Dept: HEALTH INFORMATION MANAGEMENT | Facility: CLINIC | Age: 67
End: 2022-05-18

## 2022-05-18 VITALS
HEIGHT: 66 IN | SYSTOLIC BLOOD PRESSURE: 120 MMHG | DIASTOLIC BLOOD PRESSURE: 72 MMHG | WEIGHT: 164 LBS | BODY MASS INDEX: 26.36 KG/M2 | HEART RATE: 86 BPM

## 2022-05-18 DIAGNOSIS — I10 ESSENTIAL HYPERTENSION: Primary | ICD-10-CM

## 2022-05-18 DIAGNOSIS — G43.919 INTRACTABLE MIGRAINE WITHOUT STATUS MIGRAINOSUS, UNSPECIFIED MIGRAINE TYPE: ICD-10-CM

## 2022-05-18 DIAGNOSIS — F41.8 DEPRESSION WITH ANXIETY: ICD-10-CM

## 2022-05-18 DIAGNOSIS — E03.9 HYPOTHYROIDISM, UNSPECIFIED TYPE: ICD-10-CM

## 2022-05-18 PROCEDURE — 99207 PR NO CHARGE LOS: CPT | Performed by: PHARMACIST

## 2022-05-18 RX ORDER — VALACYCLOVIR HYDROCHLORIDE 500 MG/1
500 TABLET, FILM COATED ORAL PRN
COMMUNITY
End: 2023-03-17

## 2022-05-18 RX ORDER — BUPROPION HYDROCHLORIDE 150 MG/1
150 TABLET ORAL EVERY MORNING
COMMUNITY
Start: 2022-05-16

## 2022-05-18 RX ORDER — RIZATRIPTAN BENZOATE 10 MG/1
1 TABLET, ORALLY DISINTEGRATING ORAL PRN
COMMUNITY
Start: 2022-04-23

## 2022-05-18 RX ORDER — LEVOTHYROXINE SODIUM 88 UG/1
88 TABLET ORAL DAILY
COMMUNITY

## 2022-05-18 RX ORDER — TOPIRAMATE 25 MG/1
75 TABLET, FILM COATED ORAL DAILY
COMMUNITY
Start: 2022-04-25 | End: 2022-05-18

## 2022-05-18 RX ORDER — ONDANSETRON 8 MG/1
TABLET, ORALLY DISINTEGRATING ORAL
COMMUNITY
Start: 2022-04-25 | End: 2022-05-18

## 2022-05-18 RX ORDER — SPIRONOLACTONE 25 MG/1
1 TABLET ORAL 2 TIMES DAILY
COMMUNITY
Start: 2022-05-02 | End: 2022-06-13

## 2022-05-18 RX ORDER — VERAPAMIL HYDROCHLORIDE 240 MG/1
1 CAPSULE, EXTENDED RELEASE ORAL PRN
COMMUNITY
Start: 2022-04-11 | End: 2022-07-26

## 2022-05-18 RX ORDER — LORAZEPAM 0.5 MG/1
0.5 TABLET ORAL EVERY 8 HOURS PRN
COMMUNITY
End: 2023-02-09 | Stop reason: ALTCHOICE

## 2022-05-18 RX ORDER — LOSARTAN POTASSIUM 100 MG/1
1 TABLET ORAL DAILY
COMMUNITY
Start: 2022-05-04 | End: 2022-05-18 | Stop reason: ALTCHOICE

## 2022-05-18 RX ORDER — TIZANIDINE 2 MG/1
1-3 TABLET ORAL
COMMUNITY
Start: 2022-02-01

## 2022-05-18 RX ORDER — OLMESARTAN MEDOXOMIL AND HYDROCHLOROTHIAZIDE 40/25 40; 25 MG/1; MG/1
1 TABLET ORAL DAILY
COMMUNITY
End: 2022-06-13

## 2022-05-18 NOTE — PROGRESS NOTES
Medication Therapy Management (MTM) Encounter    ASSESSMENT:                            Medication Adherence/Access: No issues identified    Hypertension: Patient's home monitoring is not typically  meeting blood pressure goal of < 130/80mmHg. Patient would benefit from the following changes - switching ARB to olmesartan which may offer slightly stronger BP lowering vs losartan. Additionally, suggest adding the lower dose hydrochlorothiazide 12.5mg daily since on the lower spironolactone dose and help keep the potassium from going to high with the combination of ARB with spironolactone. Discussed simplicity of regimen and suggest combination pill of olmesartan-hydrochlorothiazide 40/12.5mg 1 daily in the AM to replace losartan.     Hypothyroidism: Stable.    Migraine: Stable at this time, discussed option of trial CGRP therapy instead of going back to topiramate in the future if migraine frequency increases.     Depression/Anxiety: Stable. Discussed costs of mirtazapine can vary with tablet size and can be easily split if the cost is high at the 7.5mg strength.     PLAN:                            1. Stop losartan, start olmesartan/hctz 40-12.5mg 1 daily in the morning.     2. Continue spironolactone 1 daily in the morning and verapamil 240mg at night.    3. Let me know if the mirtazapine is expensive at the lower tablet size.     4. BMP today and will recheck again in 4 weeks with medication changes.    Follow-up: Return in 4 weeks (on 6/15/2022) for MTM visit in clinic, Lab Work.    SUBJECTIVE/OBJECTIVE:                          Sonny Rush is a 66 year old female coming in for an initial visit. She was referred to me from Dr. York.      Reason for visit: high blood pressure.    Allergies/ADRs: Reviewed in chart  Past Medical History: Reviewed in chart  Tobacco: She reports that she quit smoking about 18 years ago. She has never used smokeless tobacco.  Alcohol: not currently using    Medication  Adherence/Access: no issues reported    Hypertension: Current medications include losartan 100mg daily, spironolactone 25mg daily (AM only, stopped 2nd dose due to not feeling well and having frequent urination) and verapamil ER 240mg daily at bedtime (for migraine prevention as well).  Patient does self-monitor blood pressure. Patient reports the following medication side effects: orthostatic lightheadedness- specifically the day her BP was down to 102/55mmHg.  Spironolactone was added in place of chlorthalidone, first visit since this switch was made.   Per chart review- hydrochlorothiazide has worked well in the past, but caused headaches.  Does not want to take anything that could make her more tired or impact her depression.  Drinking multiple glasses of water during the day.   Goes to Yoga in the morning.   Last BMP in March showed jump in Scr to 1.56, with borderline low sodium 133mg/dl, had not been rechecked yet. Had been on chlorthalidone 25mg daily at the time of the last BMP. Scr has ranged 1.27, 1.43, 1.19, 1.56 over the year.    BP Readings from Last 1 Encounters:   05/18/22 120/72     150/84mmHg this AM  165/90 p 90  147/86 p 96  146/91 p 80  164/86 p 86  128/79 p 60 (4pm)  111/63 p 78 (noon)  102/55 p 72 (AM)  126/80 p 93  129/80 p 92  150/90 p 95  151/92 p 103    Hypothyroidism: Patient is taking levothyroxine 88 mcg daily. Patient is having the following symptoms: none.   Last TSH WNL in March 2022.     Migraine: Sees  Neurology due again in July. Chronic migraine with and without visual aura, intractable. Migraine with vertigo  Taking verapamil 240mg daily for prevention and using Maxalt for abortive medication.  (has weaned off the topiramate now and doing well). Has Zofran for nausea if needed.  Headaches almost daily that she manages by breathing and other non-pharm interventions, mostly loud sensation and annoyance vs more pain like the migraines. Migraines 2-3 times per month, which is  "improved from 2+per week.   Has tried botox which was not helpful.   Using tizanidine 2mg -4mg at bedtime for muscle spasms.   Neurology appears to have ordered Ajovy in Oct 2018, however she did not start this.   Other past trials from neurology note includes- Depakote, Topamax 200 mg, verapamil 120 mg, amitriptyline, nortriptyline, gabapentin (600 mg three times a day), Vivactil, Keppra, Zanaflex. Cymbalta (could not tolerate). Most of these treatments, she tried for a month or two and discontinued because of intolerance. Prior acute treatments: DHE inj and spray, Axert, Imitrex, Maxalt, Relpax, Soma, Zomig, stadol, Percocet, Phrenilin and muscle relaxants: Zanaflex and Flexeril. Anti-nausea treatments: Zofran, Compazine, and Vistaril. She has had trigger point injections, occipital nerve blocks, and cervical epidural injections done through pain specialists at [facility].     Depression/Anxiety: Currently taking bupropion ER 150mg once daily in the AM and mirtazapine 7.5mg once daily at bedtime. Has been able to decrease bupropion from 300mg in hopes of having less impact on blood pressure. Mood has been stable and tolerating the dose decrease.  Dose have as needed lorazepam 0.5mg tablets and using 1x per month about.       Today's Vitals: /72   Pulse 86   Ht 5' 6\" (1.676 m)   Wt 164 lb (74.4 kg)   BMI 26.47 kg/m       ----------------    I spent 35 minutes with this patient today (an extra 15 minutes was spent creating the Medication Action Plan). All changes were made via collaborative practice agreement with Kamla Wang PA-C. A copy of the visit note was provided to the patient's provider(s).    The patient was given a summary of these recommendations.     Alida Borjas, Pharm.D, [facility]  Medication Therapy Management Pharmacist  [phone]       Medication Therapy Recommendations  Essential hypertension    Current Medication: losartan (COZAAR) 100 MG tablet (Discontinued)   Rationale: More effective " medication available - Ineffective medication - Effectiveness   Recommendation: Change Medication - OLMESARTAN-AMLODIPINE-HCTZ PO - start 1 daily in the AM   Status: Accepted per CPA          Current Medication: spironolactone (ALDACTONE) 25 MG tablet   Rationale: Medication requires monitoring - Needs additional monitoring - Safety   Recommendation: Order Lab - BMP today   Status: Accepted per CPA

## 2022-05-18 NOTE — LETTER
May 18, 2022  Sonny Saldivaron  [address]  [facility] [place] [address]    Dear Ms. Victor Manuel, [facility]        Thank you for talking with me on May 18, 2022 about your health and medications. As a follow-up to our conversation, I have included two documents:      1. Your Recommended To-Do List has steps you should take to get the best results from your medications.  2. Your Medication List will help you keep track of your medications and how to take them.    If you want to talk about these documents, please call Alida Borjas RPH at phone: [phone], Monday-Friday 8-4:30pm.    I look forward to working with you and your doctors to make sure your medications work well for you.    Sincerely,    Alida Borjas RPH  [facility] Pharmacist, [employer]

## 2022-05-18 NOTE — LETTER
"Recommended To-Do List      Prepared on: 5/18/2022     You can get the best results from your medications by completing the items on this \"To-Do List.\"      Bring your To-Do List when you go to your doctor. And, share it with your family or caregivers.    My To-Do List:  What we talked about: What I should do:   A medication that is not working    Change the medication you are taking from losartan (COZAAR) to OLMESARTAN-HCTZ Tablet 1 daily          What we talked about: What I should do:   Needing additional monitoring    Get the following lab test(s): BMP- this was checked today.           What we talked about: What I should do:                       "

## 2022-05-18 NOTE — LETTER
_  Medication List        Prepared on: 5/18/2022     Bring your Medication List when you go to the doctor, hospital, or   emergency room. And, share it with your family or caregivers.     Note any changes to how you take your medications.  Cross out medications when you no longer use them.    Medication How I take it Why I use it Prescriber   buPROPion (WELLBUTRIN XL) 150 MG 24 hr tablet Take 150 mg by mouth daily Mood Kamla Wang PA-C   CINNAMON PO Take 1 tablet by mouth daily Mixed with b vitamin General Health   Patient Reported   HydrOXYzine Pamoate (VISTARIL PO) Take 25 mg by mouth as needed  Hives Kamla Wang PA-C   levothyroxine (SYNTHROID/LEVOTHROID) 88 MCG tablet Take 88 mcg by mouth daily Thyroid Kamla Wang PA-C   LORazepam (ATIVAN) 0.5 MG tablet Take 0.5 mg by mouth every 8 hours as needed Anxiety Psychiatry   mirtazapine (REMERON) 7.5 MG tablet Take 7.5 mg by mouth At Bedtime Mood and sleep Psychiatry   olmesartan-hydrochlorothiazide (BENICAR HCT) 40-25 MG tablet Take 1 tablet by mouth daily Blood pressure Kamla Wang PA-C   ondansetron (ZOFRAN-ODT) 4 MG ODT tab Take 4 mg by mouth every 8 hours as needed for nausea Nausea Kamla Wang PA-C   rizatriptan (MAXALT-MLT) 10 MG right eyeT Take 1 tablet by mouth as needed Migraines Kamla Wang PA-C   spironolactone (ALDACTONE) 25 MG tablet Take 1 tablet by mouth daily Blood pressure Kamla Wang PA-C   tiZANidine (ZANAFLEX) 2 MG tablet Take 1-3 tablets by mouth nightly as needed Muscle spasms Kamla Wang PA-C   valACYclovir (VALTREX) 500 MG tablet Take 500 mg by mouth as needed Cold Sores Kamal Wang PA-C   verapamil ER (VERELAN) 240 MG 24 hr capsule Take 1 capsule by mouth as needed Migraines and blood pressure Kamla Wang PA-C         Add new medications, over-the-counter drugs, herbals, vitamins, or  minerals in the blank rows below.    Medication How I take it Why I use it Prescriber                          Allergies:      codeine sulfate;  depakote; niacin; prednisone        Side effects I have had:               Other Information:              My notes and questions:

## 2022-05-18 NOTE — PATIENT INSTRUCTIONS
"Recommendations from today's MTM visit:                                                    MTM (medication therapy management) is a service provided by a clinical pharmacist designed to help you get the most of out of your medicines.   Today we reviewed what your medicines are for, how to know if they are working, that your medicines are safe and how to make your medicine regimen as easy as possible.        1. Stop losartan, start olmesartan/hctz 40-12.5mg 1 daily in the morning.     2. Continue spironolactone 1 daily in the morning and verapamil 240mg at night.    3. Let me know if the mirtazapine is expensive at the lower tablet size.       Follow-up: Return in 4 weeks (on 6/15/2022) for MTM visit in clinic, Lab Work.      It was great speaking with you today.  I value your experience and would be very thankful for your time in providing feedback in our clinic survey. In the next few days, you may receive an email or text message from [employer] with a link to a survey related to your  clinical pharmacist.\"       My Clinical Pharmacist's contact information:                                                      Please feel free to contact me with any questions or concerns you have.      Alida Borjas, Pharm.D, [facility]  Medication Therapy Management Pharmacist  [phone]     "

## 2022-06-10 ENCOUNTER — TELEPHONE (OUTPATIENT)
Dept: PHARMACY | Facility: PHYSICIAN GROUP | Age: 67
End: 2022-06-10
Payer: COMMERCIAL

## 2022-06-10 NOTE — TELEPHONE ENCOUNTER
Last MTM visit attempted to switch losartan to olmesartan-hydrochlorothiazide 40/12.5mg daily for better lower effects and possible tolerability of the lower dose diuretic, however after only a few days she did not feel well and went back to the losartan 100mg daily regimen. Saw Kamla Wang PA-C on 6/3 added in clonidine to losartan 100mg daily, spironolactone 25mg daily (AM only, stopped 2nd dose due to not feeling well and having frequent urination) and verapamil ER 240mg daily at bedtime (for migraine prevention as well).   At that time encouraged to cut back on frequency of blood pressure monitoring given the added anxiety around checking pressures so often and follow up with MTM for next steps.     Left message for pt to call back and schedule follow up MTM visit.     Alida Borjas, Pharm.D, BCACP  Medication Therapy Management Pharmacist  [phone]

## 2022-06-13 RX ORDER — LOSARTAN POTASSIUM 100 MG/1
100 TABLET ORAL DAILY
COMMUNITY

## 2022-06-13 RX ORDER — CHLORTHALIDONE 25 MG/1
25 TABLET ORAL DAILY
COMMUNITY
End: 2022-11-08

## 2022-06-13 RX ORDER — CLONIDINE HYDROCHLORIDE 0.1 MG/1
0.1 TABLET ORAL 2 TIMES DAILY
COMMUNITY
End: 2022-07-20

## 2022-06-13 NOTE — TELEPHONE ENCOUNTER
Correction on what she is taking- currently has chlorthalidone 25mg plus clonidine once daily and losartan 100mg daily is what she has been taking, but wants to know if okay to alternate days taking clonidine and chlorthalidone due to polyuria.     Discussed cutting chlorthalidone in half and taking clonidine twice daily as ordered for now with the losartan 100mg. Set up follow up call for Wednesday to update orders and check on home readings. Do not recommend alternative day dosing due to kinetics of medication.     Changes made per CPA with Kamla Wang PA-C.     Alida Borjas, Pharm.D, BCACP  Medication Therapy Management Pharmacist  [phone]

## 2022-06-14 NOTE — PROGRESS NOTES
Medication Therapy Management (MTM) Encounter    ASSESSMENT:                            Medication Adherence/Access: No issues identified    Hypertension: Patient is meeting blood pressure goal of < 140/90mmHg. Patient would benefit from the following changes - continued doses for another week watching tolerability as she is meeting goal blood pressure without the extreme changes in HR and tolerating okay at this time.    PLAN:                            1. No changes at this time, but updated prescription for clonidine for 1 month.     Follow-up: Return in 1 week (on 6/22/2022) for Phone call with MTM.    SUBJECTIVE/OBJECTIVE:                          Sonny Rush is a 66 year old female called for a follow-up visit.  Today's visit is a follow-up MTM visit from 5/18     Reason for visit: BP medication follow up.    Allergies/ADRs: Reviewed in chart  Past Medical History: Reviewed in chart  Tobacco: She reports that she quit smoking about 18 years ago. She has never used smokeless tobacco.  Alcohol: not currently using    Medication Adherence/Access: no issues reported, cutting chlorthalidone is not super easy.      Hypertension: Current medications include chlorthalidone 12.5mg plus clonidine 0.1mg twice daily and losartan 100mg daily. Only since Monday.  Also on verapamil ER 240mg once daily for migraines. Last MTM visit attempted to switch losartan to olmesartan-hydrochlorothiazide 40/12.5mg daily for better lower effects and possible tolerability of the lower dose diuretic, however after only a few days she did not feel well and went back to the losartan 100mg daily regimen.   Patient does self-monitor blood pressure. Patient reports no current medication side effects. Previously had increased headaches with BP med changes, but this has not been an issue this week. She is very tired, but also has not been sleeping well for >1 week now.   Blood pressure readings-   127-125/80s  Pulse 80s  Office visit BP =  160/90mmhg on 6/3/22 before starting clonidine.     ----------------    I spent 8 minutes with this patient today. All changes were made via collaborative practice agreement with Patricio York MD. A copy of the visit note was provided to the patient's provider(s).    The patient declined a summary of these recommendations.     Alida Borjas, Pharm.D, [facility]  Medication Therapy Management Pharmacist  [phone]      Telemedicine Visit Details  Type of service:  Telephone visit  Start Time: 8:30 AM  End Time: 8:38 AM  Originating Location (patient location): [place]  Distant Location (provider location):  [facility] M[facility]     Medication Therapy Recommendations  No medication therapy recommendations to display

## 2022-06-15 ENCOUNTER — VIRTUAL VISIT (OUTPATIENT)
Dept: PHARMACY | Facility: PHYSICIAN GROUP | Age: 67
End: 2022-06-15
Payer: COMMERCIAL

## 2022-06-15 DIAGNOSIS — I10 ESSENTIAL HYPERTENSION: Primary | ICD-10-CM

## 2022-06-15 PROCEDURE — 99207 PR NO CHARGE LOS: CPT | Performed by: PHARMACIST

## 2022-06-22 ENCOUNTER — VIRTUAL VISIT (OUTPATIENT)
Dept: PHARMACY | Facility: PHYSICIAN GROUP | Age: 67
End: 2022-06-22
Payer: COMMERCIAL

## 2022-06-22 DIAGNOSIS — I10 ESSENTIAL HYPERTENSION: Primary | ICD-10-CM

## 2022-06-22 PROCEDURE — 99207 PR NO CHARGE LOS: CPT | Performed by: PHARMACIST

## 2022-06-22 NOTE — PROGRESS NOTES
Medication Therapy Management (MTM) Encounter    ASSESSMENT:                            Medication Adherence/Access: No issues identified    Hypertension: Patient is meeting blood pressure goal of < 140/90mmHg most of the day, starting to get low AM readings and more concern for possible hypotension in the AM around workingout. Patient would benefit from the following changes - moving losartan to bedtime.    PLAN:                            1. Move losartan to bedtime.    Follow-up: Return in about 3 weeks (around 7/13/2022) for Phone call with MTM.- she is calling clinic to set this up.    SUBJECTIVE/OBJECTIVE:                          Sonny Rush is a 66 year old female called for a follow-up visit.  Today's visit is a follow-up MTM visit from 6/15     Reason for visit: diabetes follow up.    Allergies/ADRs: Reviewed in chart  Past Medical History: Reviewed in chart  Tobacco: She reports that she quit smoking about 18 years ago. She has never used smokeless tobacco.  Alcohol: not currently using    Medication Adherence/Access: no issues reported, cutting chlorthalidone is not super easy.      Hypertension: Current medications include chlorthalidone 12.5mg plus clonidine 0.1mg twice daily and losartan 100mg in the morning daily. On this combination 1.5 weeks now. Started taking clonidine later in the day ~ 11am vs early AM due to fatigue before going to yoga class. Not interested in clonidine patch due to hot yoga and patch adhesive.  Yesterday blood pressures were    115/113/109, systolic during the day, so held AM clonidine, but then was 142/88 last night  Today 133/77 p 85 upon waking.   After yoga today- 99/65 p 97, felt fine no dizziness or other symptoms.   Drinking lots of water to stay hydrated.   Has been seeing mostly 120-130s systolic and 70-80 diastolic.  Also on verapamil ER 240mg once daily for migraines. Last MTM visit attempted to switch losartan to olmesartan-hydrochlorothiazide 40/12.5mg daily  for better lower effects and possible tolerability of the lower dose diuretic, however after only a few days she did not feel well and went back to the losartan 100mg daily regimen.   Patient does self-monitor blood pressure. Patient reports no current medication side effects. Previously had increased headaches with BP med changes, but this has not been an issue this week.     ----------------    I spent 15 minutes with this patient today. All changes were made via collaborative practice agreement with Patricio York MD. A copy of the visit note was provided to the patient's provider(s).    The patient was declined a summary of these recommendations.     Alida Borjas, Pharm.D, [facility]  Medication Therapy Management Pharmacist  [phone]      Telemedicine Visit Details  Type of service:  Telephone visit  Start Time: 2:00 PM  End Time: 2:15 PM  Originating Location (patient location): [place]  Distant Location (provider location):  [facility]     Medication Therapy Recommendations  Essential hypertension    Current Medication: losartan (COZAAR) 100 MG tablet   Rationale: Undesirable effect - Adverse medication event - Safety   Recommendation: Change Administration Time - move to bedtime.   Status: Patient Agreed - Adherence/Education

## 2022-06-22 NOTE — PATIENT INSTRUCTIONS
"Recommendations from today's MTM visit:                                                       Take losartan at bedtime.    Follow-up: No follow-ups on file.    It was great speaking with you today.  I value your experience and would be very thankful for your time in providing feedback in our clinic survey. In the next few days, you may receive an email or text message from [employer] with a link to a survey related to your  clinical pharmacist.\"       My Clinical Pharmacist's contact information:                                                      Please feel free to contact me with any questions or concerns you have.      Alida Borjas, Pharm.D, [facility]  Medication Therapy Management Pharmacist  [phone]     "

## 2022-07-20 ENCOUNTER — TRANSFERRED RECORDS (OUTPATIENT)
Dept: HEALTH INFORMATION MANAGEMENT | Facility: CLINIC | Age: 67
End: 2022-07-20

## 2022-07-20 ENCOUNTER — VIRTUAL VISIT (OUTPATIENT)
Dept: PHARMACY | Facility: PHYSICIAN GROUP | Age: 67
End: 2022-07-20
Payer: COMMERCIAL

## 2022-07-20 DIAGNOSIS — R00.2 PALPITATIONS: ICD-10-CM

## 2022-07-20 DIAGNOSIS — I10 ESSENTIAL HYPERTENSION: Primary | ICD-10-CM

## 2022-07-20 PROCEDURE — 99207 PR NO CHARGE LOS: CPT | Performed by: PHARMACIST

## 2022-07-20 NOTE — PROGRESS NOTES
Medication Therapy Management (MTM) Encounter    ASSESSMENT:                            Medication Adherence/Access: No issues identified    Hypertension: Patient is not meeting blood pressure goal of < 140/90mmHg. Patient would benefit from the following changes - checking BMP on current regimen and given ongoing tolerability issues and pressure readings on the clonidine suggest short taper off this medication. Discussed emergent scenario to see care at the ER if chest pain, blurry vision, numbness etc occur. She will continue to monitor home blood pressures daily. Discussed referral to cardiology and consideration for Ziopatch for further evaluation.     PLAN:                            1. Two days of clonidine for 2 nights then stop.     2. BMP tomorrow with lab only.     3. Discussed cardiology referral with Kamla Wang PA-C. Referral placed for patient to consult on hypertension and palpitations.       Follow-up: Return in 2 weeks (on 8/3/2022) for Phone call with MTM.    SUBJECTIVE/OBJECTIVE:                          Sonny Rush is a 66 year old female called for a follow-up visit.  Today's visit is a follow-up MTM visit from 6/22   Reason for visit: Blood pressure follow up.    Allergies/ADRs: Reviewed in chart  Past Medical History: Reviewed in chart  Tobacco: She reports that she quit smoking about 19 years ago. She has never used smokeless tobacco.  Alcohol: not currently using    Medication Adherence/Access: no issues reported, cutting chlorthalidone is not super easy so changed back to 1 full tab daily.     Hypertension: Current medications include chlorthalidone 25mg AM plus clonidine 0.1mg twice daily and losartan 100mg in the evening now and verapamil  (for migraines). Getting much higher readings and much more fatigue with the clonidine and does not want to continue to take this.   Previously attempted to switch losartan to olmesartan-hydrochlorothiazide 40/12.5mg daily for better lower  "effects and possible tolerability of the lower dose diuretic, however after only a few days she did not feel well and went back to the losartan 100mg daily regimen. She does state that when she made this change she was taking it prior to her Yoga classes, which may have made it hard for her to tolerated and would consider trying this again in the future taking after class.   Has not had recent BMP.   History of lower sodium levels.   Drinking lots of water to stay hydrated.    Home monitoring with arm cuff ([employer] brand)- showing palpitations on her monitor, occurring several times per month. Home readings running higher in the 120-150s, getting much more labile readings now on current regimen than before.   Patient reports July 10-11th was havng a 48 hour migraine needed 2 triptan per day, when she also was having severe pain \"like heartburn, but worse\". Did not go to ER or see anyone. Did see Neurology 13th- discussed migraine and pain, recommended to maybe have vascular evaluation. Neurology kept same plan for migraine medications at that time.     ----------------    I spent 17 minutes with this patient today. All changes were made via collaborative practice agreement with Patricio York MD. A copy of the visit note was provided to the patient's provider(s).    The patient declined a summary of these recommendations.     Alida Borjas, Pharm.D, [facility]  Medication Therapy Management Pharmacist  [phone]      Telemedicine Visit Details  Type of service:  Telephone visit  Start Time: 9:08 AM  End Time: 9:25 AM  Originating Location (patient location): Home  Distant Location (provider location):  [facility] MTM     Medication Therapy Recommendations  Elevated blood pressure reading without diagnosis of hypertension    Current Medication: cloNIDine (CATAPRES) 0.1 MG tablet (Discontinued)   Rationale: Undesirable effect - Adverse medication event - Safety   Recommendation: Discontinue " Medication - stop medication   Status: Accepted per CPA         Essential hypertension    Current Medication: chlorthalidone (HYGROTON) 25 MG tablet   Rationale: Medication requires monitoring - Needs additional monitoring - Safety   Recommendation: Order Lab - BMP due   Status: Accepted per CPA          Current Medication: losartan (COZAAR) 100 MG tablet   Rationale: Medication requires monitoring - Needs additional monitoring - Effectiveness   Recommendation: Referral to Service  - referral   Status: Accepted per Provider

## 2022-07-20 NOTE — PATIENT INSTRUCTIONS
"Recommendations from today's MTM visit:                                                       1. Starting tomorrow, take clonidine once daily at bedtime for 2 nights then stop medication.     2. BMP tomorrow with lab only.     3. Discussed cardiology referral with Kamla Wang PA-C. Referral placed for patient to consult on hypertension and palpitations.       Follow-up: Return in 2 weeks (on 8/3/2022) for Phone call with [facility].    It was great speaking with you today.  I value your experience and would be very thankful for your time in providing feedback in our clinic survey. In the next few days, you may receive an email or text message from [facility] with a link to a survey related to your  clinical pharmacist.\"     My Clinical Pharmacist's contact information:                                                      Please feel free to contact me with any questions or concerns you have.      Alida Borjas, Pharm.D, [facility]  Medication Therapy Management Pharmacist  [phone]      "

## 2022-07-26 ENCOUNTER — HOSPITAL ENCOUNTER (OUTPATIENT)
Dept: CARDIOLOGY | Facility: CLINIC | Age: 67
Discharge: HOME | End: 2022-07-26
Attending: INTERNAL MEDICINE | Admitting: INTERNAL MEDICINE
Payer: COMMERCIAL

## 2022-07-26 ENCOUNTER — OFFICE VISIT (OUTPATIENT)
Dept: CARDIOLOGY | Facility: CLINIC | Age: 67
End: 2022-07-26
Attending: PHYSICIAN ASSISTANT
Payer: COMMERCIAL

## 2022-07-26 VITALS
OXYGEN SATURATION: 99 % | HEART RATE: 91 BPM | HEIGHT: 66 IN | DIASTOLIC BLOOD PRESSURE: 90 MMHG | WEIGHT: 160 LBS | BODY MASS INDEX: 25.71 KG/M2 | SYSTOLIC BLOOD PRESSURE: 132 MMHG

## 2022-07-26 DIAGNOSIS — G43.019 INTRACTABLE MIGRAINE WITHOUT AURA AND WITHOUT STATUS MIGRAINOSUS: Primary | ICD-10-CM

## 2022-07-26 DIAGNOSIS — I10 ESSENTIAL HYPERTENSION: ICD-10-CM

## 2022-07-26 DIAGNOSIS — R00.2 PALPITATIONS: ICD-10-CM

## 2022-07-26 PROCEDURE — 93248 EXT ECG>7D<15D REV&INTERPJ: CPT | Performed by: INTERNAL MEDICINE

## 2022-07-26 PROCEDURE — 93246 EXT ECG>7D<15D RECORDING: CPT

## 2022-07-26 PROCEDURE — 99204 OFFICE O/P NEW MOD 45 MIN: CPT | Performed by: INTERNAL MEDICINE

## 2022-07-26 RX ORDER — CLONIDINE HYDROCHLORIDE 0.1 MG/1
0.1 TABLET ORAL
COMMUNITY
Start: 2022-06-15 | End: 2022-08-05

## 2022-07-26 RX ORDER — VERAPAMIL HYDROCHLORIDE 360 MG/1
360 CAPSULE, DELAYED RELEASE PELLETS ORAL DAILY
Qty: 30 CAPSULE | Refills: 11 | Status: SHIPPED | OUTPATIENT
Start: 2022-07-26 | End: 2022-07-27

## 2022-07-26 NOTE — PROGRESS NOTES
Service Date: 07/26/2022    REFERRING PROVIDER:  BENY Diego.    HISTORY OF PRESENT ILLNESS:  Ms. Rush is a pleasant 66-year-old female with a history of hypertension, chronic daily migraines, thyroiditis, depression and anxiety.  She is referred to us today due to symptoms of palpitations and elevated blood pressure.  Blood pressure has been very hard to control in the last month.  She has also noted increase in palpitations or fluttering in her chest.  She has been managing her blood pressure with a combination of losartan, chlorthalidone, verapamil and clonidine, but continues to have elevated blood pressure numbers consistently.  I reviewed her blood work.  She does have underlying chronic kidney disease it looks like.  In May she had a creatinine of 1.29.  This is down from her previous in March of 1.56.  Her electrolyte panel looks normal.  She had an echocardiogram in May also that showed normal LV function, no significant valve disease.  I reviewed her EKG from her primary, which was done in March, that showed a normal sinus rhythm, no acute ST or T-wave abnormalities, essentially normal EKG.    PHYSICAL EXAMINATION:    VITAL SIGNS:  Today, her blood pressure was 132/90, pulse of 91, weight is 160 with a body mass index of 25.  NECK:  Carotid upstrokes were brisk without bruit.  CARDIOVASCULAR:  Tones today were regular.  I did not appreciate a murmur, gallop or rub.  LUNGS:  Lung fields were clear.  EXTREMITIES:  She has no peripheral edema.    IMPRESSION:  In summary, Ms. Rush is a very pleasant 66-year-old female with ongoing hypertension that is not well controlled, new onset of palpitations in the last month, with an underlying history of chronic daily migraines, Meniere's and thyroiditis and chronic kidney disease.  I suspect that she is not on any type of beta blocker due to her chronic daily migraine headaches.  Clonidine, which she is taking just once daily right now and then sometimes as  needed, can lead to rebound hypertension and may not be a good choice for her.  I see that she is on verapamil and she tells me this is actually for her migraine headaches.  She is taking on a daily basis.  We could certainly go up on this medication since she is tolerating it and see if it helps to control her blood pressure is a little bit better.  I would also recommend a leadless heart monitor to assess her palpitations.  I explained this to her and she is agreeable to both.  I have increased her verapamil to 360 mg daily and will have her wear a leadless heart monitor for 2 weeks and have her follow up in clinic with the test results.  If she continues to have elevated blood pressure, assessment for secondary hypertension may be appropriate.    Please feel free to contact me with any questions you have in regards to her care.    Rika Lovelace DO  cc:   Shauna Wang [facility]  [address] [place] [address], [address]  [place], [place], [address]    Rika Lovelace DO        D: 2022   T: 2022   MT: britany    Name:     SOFÍA HALL  MRN:      [id number]        Account:      [id number]   :      1955           Service Date: 2022       Document: [id number]

## 2022-07-26 NOTE — LETTER
7/26/2022    Olga Wang PA-C  [address] [facility] [address]  [place] [address]    RE: Sonny Rush       Dear Colleague,     I had the pleasure of seeing Sonny Rush in the [facility].  HPI and Plan:   See dictation    Orders Placed This Encounter   Procedures     Follow-Up with Cardiology SHELYB     Leadless EKG Monitor 8 to 14 Days       Orders Placed This Encounter   Medications     cloNIDine (CATAPRES) 0.1 MG tablet     Sig: Take 0.1 mg by mouth     verapamil ER (VERELAN) 360 MG 24 hr capsule     Sig: Take 1 capsule (360 mg) by mouth daily     Dispense:  30 capsule     Refill:  11       Medications Discontinued During This Encounter   Medication Reason     verapamil ER (VERELAN) 240 MG 24 hr capsule          Encounter Diagnoses   Name Primary?     Essential hypertension      Palpitations      Intractable migraine without aura and without status migrainosus Yes       CURRENT MEDICATIONS:  Current Outpatient Medications   Medication Sig Dispense Refill     buPROPion (WELLBUTRIN XL) 150 MG 24 hr tablet Take 150 mg by mouth daily       chlorthalidone (HYGROTON) 25 MG tablet Take 25 mg by mouth daily       CINNAMON PO Take 1 tablet by mouth daily Mixed with b vitamin       cloNIDine (CATAPRES) 0.1 MG tablet Take 0.1 mg by mouth       HydrOXYzine Pamoate (VISTARIL PO) Take 25 mg by mouth as needed       levothyroxine (SYNTHROID/LEVOTHROID) 88 MCG tablet Take 88 mcg by mouth daily       LORazepam (ATIVAN) 0.5 MG tablet Take 0.5 mg by mouth every 8 hours as needed       losartan (COZAAR) 100 MG tablet Take 100 mg by mouth daily       mirtazapine (REMERON) 7.5 MG tablet Take 7.5 mg by mouth At Bedtime       ondansetron (ZOFRAN-ODT) 4 MG ODT tab Take 4 mg by mouth every 8 hours as needed for nausea       rizatriptan (MAXALT-MLT) 10 MG ODT Take 1 tablet by mouth as needed       tiZANidine (ZANAFLEX) 2 MG tablet Take 1-3 tablets by mouth nightly as needed       valACYclovir (VALTREX)  500 MG tablet Take 500 mg by mouth as needed       verapamil ER (VERELAN) 360 MG 24 hr capsule Take 1 capsule (360 mg) by mouth daily 30 capsule 11       ALLERGIES     Allergies   Allergen Reactions     Codeine Sulfate      Cough syrup     Depakote      Niacin      Prednisone        PAST MEDICAL HISTORY:  Past Medical History:   Diagnosis Date     Anxiety      Malignant neoplasm (H)      Meniere's disease      Migraines     ON NEURONTIN, sees neurologist     Mild depression (H)     sees psych     PMB (postmenopausal bleeding)     Had  D & C  showing inactive endometrium       PAST SURGICAL HISTORY:  Past Surgical History:   Procedure Laterality Date     BIOPSY BREAST SEED LOCALIZATION  7/3/2012    Procedure: BIOPSY BREAST SEED LOCALIZATION;  RIGHT BREAST LUMPECTOMY WITH ULTRASOUND SEED LOCALIZATION;  Surgeon: Jaclyn Dalal MD;  Location:  [facility]     BREAST BIOPSY, RT/LT  00    stereotactic bx right breast--fibrocystic change     COLONOSCOPY N/A 2017    Procedure: COMBINED COLONOSCOPY, SINGLE OR MULTIPLE BIOPSY/POLYPECTOMY BY BIOPSY;  Surgeon: Muriel Wolff MD;  Location:  GI     DILATION AND CURETTAGE  06    inactive endometrium on path; done for PMB     ENT SURGERY      ear tubes, rhinoplasty     LAPAROSCOPY      Age 27 for pelvic pain     ORTHOPEDIC SURGERY      [email or web address]       FAMILY HISTORY:  Family History   Problem Relation Age of Onset     Breast Cancer Unknown         aunt     Hyperlipidemia Father      Hypertension Father      Ovarian Cancer Unknown      Uterine Cancer Unknown        SOCIAL HISTORY:  Social History     Socioeconomic History     Marital status:      Spouse name: Virgilio     Number of children: 0   Occupational History     Occupation:      Employer: [employer]   Tobacco Use     Smoking status: Former Smoker     Quit date: 7/3/2003     Years since quittin.0     Smokeless tobacco: Never Used   Substance and Sexual Activity     Alcohol  "use: Yes     Alcohol/week: 0.0 standard drinks     Drug use: No     Sexual activity: Never     Partners: Male     Birth control/protection: Post-menopausal       Review of Systems:  Skin:          Eyes:         ENT:  Positive for vertigo    Respiratory:  Positive for shortness of breath     Cardiovascular:  Negative;edema chest pain;Positive for;fatigue;palpitations;lightheadedness;dizziness palpitations and chest pain: occasional  Gastroenterology:        Genitourinary:         Musculoskeletal:  Positive for back pain    Neurologic:  Positive for migraine headaches    Psychiatric:         Heme/Lymph/Imm:  Positive for allergies    Endocrine:  Positive for thyroid disorder      Physical Exam:  Vitals: BP (!) 132/90 (BP Location: Right arm, Patient Position: Sitting)   Pulse 91   Ht 1.676 m (5' 6\")   Wt 72.6 kg (160 lb)   SpO2 99%   BMI 25.82 kg/m      Constitutional:  cooperative;in no acute distress        Skin:  warm and dry to the touch          Head:  normocephalic        Eyes:  pupils equal and round        Lymph:      ENT:  no pallor or cyanosis        Neck:  no carotid bruit        Respiratory:  clear to auscultation;normal symmetry         Cardiac: regular rhythm;no murmurs, gallops or rubs detected                pulses full and equal                                        GI:  abdomen soft;no bruits        Extremities and Muscular Skeletal:  no deformities, clubbing, cyanosis, erythema observed;no edema              Neurological:  no gross motor deficits;affect appropriate        Psych:  Alert and Oriented x 3          CC  Olga Wang PA-C  [address] [facility] [address]  [place] [address]                    Service Date: 07/26/2022    REFERRING PROVIDER:  BENY Diego.    HISTORY OF PRESENT ILLNESS:  Ms. Rush is a pleasant 66-year-old female with a history of hypertension, chronic daily migraines, thyroiditis, depression and anxiety.  She is referred to us today due to symptoms of palpitations " and elevated blood pressure.  Blood pressure has been very hard to control in the last month.  She has also noted increase in palpitations or fluttering in her chest.  She has been managing her blood pressure with a combination of losartan, chlorthalidone, verapamil and clonidine, but continues to have elevated blood pressure numbers consistently.  I reviewed her blood work.  She does have underlying chronic kidney disease it looks like.  In May she had a creatinine of 1.29.  This is down from her previous in March of 1.56.  Her electrolyte panel looks normal.  She had an echocardiogram in May also that showed normal LV function, no significant valve disease.  I reviewed her EKG from her primary, which was done in March, that showed a normal sinus rhythm, no acute ST or T-wave abnormalities, essentially normal EKG.    PHYSICAL EXAMINATION:    VITAL SIGNS:  Today, her blood pressure was 132/90, pulse of 91, weight is 160 with a body mass index of 25.  NECK:  Carotid upstrokes were brisk without bruit.  CARDIOVASCULAR:  Tones today were regular.  I did not appreciate a murmur, gallop or rub.  LUNGS:  Lung fields were clear.  EXTREMITIES:  She has no peripheral edema.    IMPRESSION:  In summary, Ms. Rush is a very pleasant 66-year-old female with ongoing hypertension that is not well controlled, new onset of palpitations in the last month, with an underlying history of chronic daily migraines, Meniere's and thyroiditis and chronic kidney disease.  I suspect that she is not on any type of beta blocker due to her chronic daily migraine headaches.  Clonidine, which she is taking just once daily right now and then sometimes as needed, can lead to rebound hypertension and may not be a good choice for her.  I see that she is on verapamil and she tells me this is actually for her migraine headaches.  She is taking on a daily basis.  We could certainly go up on this medication since she is tolerating it and see if it helps  to control her blood pressure is a little bit better.  I would also recommend a leadless heart monitor to assess her palpitations.  I explained this to her and she is agreeable to both.  I have increased her verapamil to 360 mg daily and will have her wear a leadless heart monitor for 2 weeks and have her follow up in clinic with the test results.  If she continues to have elevated blood pressure, assessment for secondary hypertension may be appropriate.    Please feel free to contact me with any questions you have in regards to her care.    Rika Lovelace DO  cc:   BENY Bang  [facility]  [address] [place] [address], [address]  [place], [place], [address]    Rika Lovelace DO        D: 2022   T: 2022   MT: britany    Name:     SOFÍA HALL  MRN:      [id number]        Account:      [id number]   :      1955           Service Date: 2022       Document: [id number]    Thank you for allowing me to participate in the care of your patient.      Sincerely,     Rika Lovelace DO     [facility]

## 2022-07-26 NOTE — PROGRESS NOTES
HPI and Plan:   See dictation    Orders Placed This Encounter   Procedures     Follow-Up with Cardiology SHELBY     Leadless EKG Monitor 8 to 14 Days       Orders Placed This Encounter   Medications     cloNIDine (CATAPRES) 0.1 MG tablet     Sig: Take 0.1 mg by mouth     verapamil ER (VERELAN) 360 MG 24 hr capsule     Sig: Take 1 capsule (360 mg) by mouth daily     Dispense:  30 capsule     Refill:  11       Medications Discontinued During This Encounter   Medication Reason     verapamil ER (VERELAN) 240 MG 24 hr capsule          Encounter Diagnoses   Name Primary?     Essential hypertension      Palpitations      Intractable migraine without aura and without status migrainosus Yes       CURRENT MEDICATIONS:  Current Outpatient Medications   Medication Sig Dispense Refill     buPROPion (WELLBUTRIN XL) 150 MG 24 hr tablet Take 150 mg by mouth daily       chlorthalidone (HYGROTON) 25 MG tablet Take 25 mg by mouth daily       CINNAMON PO Take 1 tablet by mouth daily Mixed with b vitamin       cloNIDine (CATAPRES) 0.1 MG tablet Take 0.1 mg by mouth       HydrOXYzine Pamoate (VISTARIL PO) Take 25 mg by mouth as needed       levothyroxine (SYNTHROID/LEVOTHROID) 88 MCG tablet Take 88 mcg by mouth daily       LORazepam (ATIVAN) 0.5 MG tablet Take 0.5 mg by mouth every 8 hours as needed       losartan (COZAAR) 100 MG tablet Take 100 mg by mouth daily       mirtazapine (REMERON) 7.5 MG tablet Take 7.5 mg by mouth At Bedtime       ondansetron (ZOFRAN-ODT) 4 MG ODT tab Take 4 mg by mouth every 8 hours as needed for nausea       rizatriptan (MAXALT-MLT) 10 MG ODT Take 1 tablet by mouth as needed       tiZANidine (ZANAFLEX) 2 MG tablet Take 1-3 tablets by mouth nightly as needed       valACYclovir (VALTREX) 500 MG tablet Take 500 mg by mouth as needed       verapamil ER (VERELAN) 360 MG 24 hr capsule Take 1 capsule (360 mg) by mouth daily 30 capsule 11       ALLERGIES     Allergies   Allergen Reactions     Codeine Sulfate      Cough  syrup     Depakote      Niacin      Prednisone        PAST MEDICAL HISTORY:  Past Medical History:   Diagnosis Date     Anxiety      Malignant neoplasm (H)      Meniere's disease      Migraines     ON NEURONTIN, sees neurologist     Mild depression (H)     sees psych     PMB (postmenopausal bleeding)     Had  D & C  showing inactive endometrium       PAST SURGICAL HISTORY:  Past Surgical History:   Procedure Laterality Date     BIOPSY BREAST SEED LOCALIZATION  7/3/2012    Procedure: BIOPSY BREAST SEED LOCALIZATION;  RIGHT BREAST LUMPECTOMY WITH ULTRASOUND SEED LOCALIZATION;  Surgeon: Jaclyn Dalal MD;  Location:  [facility]     BREAST BIOPSY, RT/LT  00    stereotactic bx right breast--fibrocystic change     COLONOSCOPY N/A 2017    Procedure: COMBINED COLONOSCOPY, SINGLE OR MULTIPLE BIOPSY/POLYPECTOMY BY BIOPSY;  Surgeon: Muriel Wolff MD;  Location:  GI     DILATION AND CURETTAGE  06    inactive endometrium on path; done for PMB     ENT SURGERY      ear tubes, rhinoplasty     LAPAROSCOPY      Age 27 for pelvic pain     ORTHOPEDIC SURGERY      yaya       FAMILY HISTORY:  Family History   Problem Relation Age of Onset     Breast Cancer Unknown         aunt     Hyperlipidemia Father      Hypertension Father      Ovarian Cancer Unknown      Uterine Cancer Unknown        SOCIAL HISTORY:  Social History     Socioeconomic History     Marital status:      Spouse name: Virgilio     Number of children: 0   Occupational History     Occupation:      Employer: [employer]   Tobacco Use     Smoking status: Former Smoker     Quit date: 7/3/2003     Years since quittin.0     Smokeless tobacco: Never Used   Substance and Sexual Activity     Alcohol use: Yes     Alcohol/week: 0.0 standard drinks     Drug use: No     Sexual activity: Never     Partners: Male     Birth control/protection: Post-menopausal       Review of Systems:  Skin:          Eyes:         ENT:  Positive for  "vertigo    Respiratory:  Positive for shortness of breath     Cardiovascular:  Negative;edema chest pain;Positive for;fatigue;palpitations;lightheadedness;dizziness palpitations and chest pain: occasional  Gastroenterology:        Genitourinary:         Musculoskeletal:  Positive for back pain    Neurologic:  Positive for migraine headaches    Psychiatric:         Heme/Lymph/Imm:  Positive for allergies    Endocrine:  Positive for thyroid disorder      Physical Exam:  Vitals: BP (!) 132/90 (BP Location: Right arm, Patient Position: Sitting)   Pulse 91   Ht 1.676 m (5' 6\")   Wt 72.6 kg (160 lb)   SpO2 99%   BMI 25.82 kg/m      Constitutional:  cooperative;in no acute distress        Skin:  warm and dry to the touch          Head:  normocephalic        Eyes:  pupils equal and round        Lymph:      ENT:  no pallor or cyanosis        Neck:  no carotid bruit        Respiratory:  clear to auscultation;normal symmetry         Cardiac: regular rhythm;no murmurs, gallops or rubs detected                pulses full and equal                                        GI:  abdomen soft;no bruits        Extremities and Muscular Skeletal:  no deformities, clubbing, cyanosis, erythema observed;no edema              Neurological:  no gross motor deficits;affect appropriate        Psych:  Alert and Oriented x 3          CC  Olga Wang PA-C  [address]  [place] [address]                  "

## 2022-07-27 ENCOUNTER — TELEPHONE (OUTPATIENT)
Dept: CARDIOLOGY | Facility: CLINIC | Age: 67
End: 2022-07-27

## 2022-07-27 DIAGNOSIS — R00.2 PALPITATIONS: ICD-10-CM

## 2022-07-27 DIAGNOSIS — G43.019 INTRACTABLE MIGRAINE WITHOUT AURA AND WITHOUT STATUS MIGRAINOSUS: ICD-10-CM

## 2022-07-27 DIAGNOSIS — I10 ESSENTIAL HYPERTENSION: ICD-10-CM

## 2022-07-27 RX ORDER — VERAPAMIL HYDROCHLORIDE 180 MG/1
360 CAPSULE, EXTENDED RELEASE ORAL AT BEDTIME
Qty: 60 CAPSULE | Refills: 3 | Status: SHIPPED | OUTPATIENT
Start: 2022-07-27 | End: 2022-08-29

## 2022-07-27 NOTE — TELEPHONE ENCOUNTER
Per provider OV note on 7/26/22:     I have increased her verapamil to 360 mg daily and will have her wear a leadless heart monitor for 2 weeks and have her follow up in clinic with the test results.    Medication re-ordered in 180 mg with instructions to take 2 tablets as pharmacy does not stock 360 mg tablets.    Livier Badillo RN on 7/27/2022 at 10:35 AM

## 2022-07-27 NOTE — TELEPHONE ENCOUNTER
"Fax received from [facility] pharmacy, Verapamil 360mg capsule unavailable. \"We cannot order this, Please either change to alt form of Verapamil or send to another pharmacy.\"    Will send to nursing team to review.      Aleshia Turner RN, BSN  07/27/22 at 9:50 AM      "

## 2022-08-05 ENCOUNTER — VIRTUAL VISIT (OUTPATIENT)
Dept: PHARMACY | Facility: PHYSICIAN GROUP | Age: 67
End: 2022-08-05
Payer: COMMERCIAL

## 2022-08-05 DIAGNOSIS — F41.8 DEPRESSION WITH ANXIETY: ICD-10-CM

## 2022-08-05 DIAGNOSIS — I10 ESSENTIAL HYPERTENSION: Primary | ICD-10-CM

## 2022-08-05 DIAGNOSIS — E03.9 HYPOTHYROIDISM, UNSPECIFIED TYPE: ICD-10-CM

## 2022-08-05 PROCEDURE — 99207 PR NO CHARGE LOS: CPT | Performed by: PHARMACIST

## 2022-08-05 NOTE — PROGRESS NOTES
Medication Therapy Management (MTM) Encounter    ASSESSMENT:                            Medication Adherence/Access: No issues identified    Hypertension: Patient is not meeting blood pressure goal of < 140/90mmHg. Patient would benefit from the following changes - working with psychiatry to adjust medication for depression and anxiety. Additionally do not recommend additional clonidine doses for now and follow up after zio patch results are in and reviewed by cardiology.    Depression/Anxiety: Recommend discussion with psychiatrist around restarting Lexapro for depression and anxiety vs increasing bupropion which may increase anxiety concerns that have been more predominant lately with the blood pressure issues.     Hypothyroidism: does not appear the thyroid levels are contributing to symptoms at this time.     PLAN:                            1. Patient to connect with psychiatry around restarting lexapro 2.5-5mg daily.     2. Do not take the clonidine, will be done with monitor on Monday and cardiology follow up on 8/29 for report.       Follow-up: Return in 26 days (on 8/31/2022) for Phone call with MTM.    SUBJECTIVE/OBJECTIVE:                          Sonny Rush is a 66 year old female called for a follow-up visit.  Today's visit is a follow-up MTM visit from 7/20     Reason for visit: Medication follow up.    Allergies/ADRs: Reviewed in chart  Past Medical History: Reviewed in chart  Tobacco: She reports that she quit smoking about 19 years ago. She has never used smokeless tobacco.  Alcohol: not currently using    Medication Adherence/Access: no issues reported, cutting chlorthalidone is not super easy so changed back to 1 full tab daily.     Hypertension: Current medications include chlorthalidone 25mg AM, losartan 100mg in the evening now and verapamil ER 360mg (for migraines and increased by cardiology).   Did restart her own clonidine yesterday midday x1 dose as she is feeling more desperate to feel  "better, but isn't sure exactly what is causing what.   On 8/3/22 at 2am blood pressure was down to 85/57mmHg 76 HR then at 3:30 90/59, HR 72, then later was up to 162/94, and 158/84. She is still wearing zio patch,  Monday morning will be taking off monitor. Follow up on Zio patch 8/29 with cardiology. If ongoing cardiology care is needed, she would like to seek care with a different provider.     Attempted to use clonidine, however did not tolerate this and tapered off after last visit and referred to cardiology.   Previously attempted to switch losartan to olmesartan-hydrochlorothiazide 40/12.5mg daily for better lower effects and possible tolerability of the lower dose diuretic, however after only a few days she did not feel well and went back to the losartan 100mg daily regimen.   Have been avoiding using beta blockers due to her depression and concern for increased fatigue, so has been reluctant to use these.   History of lower sodium levels, but last checked 7/27/22 and WNL. Scr does jump between 1.2 and 1.5.   Drinking lots of water to stay hydrated.      Continues to feel \"unwell\" and \"awful\"- describes really tired, some shortness of breath, no fever or runny nose. Has been going on for a year, so does not feel this is COVID related. Has not tested.               Depression/Anxiety: dizzy at night so stopped the mirtazapine, no tizanidine lately.   Bupropion ER 150mg daily (specific brand of generic due to tolerability) and has been trying to consider a dose increase with psych. As noted above in hypertension, concern for anxiety impacting hypertension.   Lexapro was tried before, but then stopped working.   Lorazepam very sparingly   Psych since 2006 Dr. Parr private practice.     Hypothyroidism: Patient is taking levothyroxine 88 mcg daily. Patient is having the following symptoms: anxiety, palpitation.   TSH =2.320 on 3/2022.     ----------------    I spent 32 minutes with this patient today. All " changes were made via collaborative practice agreement with Olga Wang PA-C. A copy of the visit note was pr ovided to the patient's provider(s).    The patient declined a summary of these recommendations.      Alida Borjas, Pharm.D, [facility]  Medication Therapy Management Pharmacist  [phone]      Telemedicine Visit Details  Type of service:  Telephone visit  Start Time: 9:30 AM  End Time: 10:02 AM  Originating Location (patient location): [place]  Distant Location (provider location):  [facility] MTM     Medication Therapy Recommendations  Depression with anxiety    Current Medication: buPROPion (WELLBUTRIN XL) 150 MG 24 hr tablet   Rationale: Synergistic therapy - Needs additional medication therapy - Indication   Recommendation: Start Medication - Lexapro 5 MG Tabs - start 5mg daily   Status: Contact Provider - Awaiting Response         Essential hypertension    Current Medication: cloNIDine (CATAPRES) 0.1 MG tablet (Discontinued)   Rationale: Does not understand instructions - Adherence - Adherence   Recommendation: Discontinue Medication - do not use clonidine at this time   Status: Accepted per CPA

## 2022-08-05 NOTE — PATIENT INSTRUCTIONS
"Recommendations from today's MTM visit:                                                         1. Patient to connect with psychiatry around restarting lexapro 2.5-5mg daily.     2. Do not take the clonidine, will be done with monitor on Monday and cardiology follow up on 8/29 for report.       Follow-up: Return in 26 days (on 8/31/2022) for Phone call with MTM.    It was great speaking with you today.  I value your experience and would be very thankful for your time in providing feedback in our clinic survey. In the next few days, you may receive an email or text message from [employer] with a link to a survey related to your  clinical pharmacist.\"     My Clinical Pharmacist's contact information:                                                      Please feel free to contact me with any questions or concerns you have.      Alida Borjas, Pharm.D, [facility]  Medication Therapy Management Pharmacist  [phone]     "

## 2022-08-29 ENCOUNTER — OFFICE VISIT (OUTPATIENT)
Dept: CARDIOLOGY | Facility: CLINIC | Age: 67
End: 2022-08-29
Payer: COMMERCIAL

## 2022-08-29 VITALS
HEIGHT: 66 IN | SYSTOLIC BLOOD PRESSURE: 123 MMHG | OXYGEN SATURATION: 100 % | WEIGHT: 160 LBS | BODY MASS INDEX: 25.71 KG/M2 | HEART RATE: 72 BPM | DIASTOLIC BLOOD PRESSURE: 70 MMHG

## 2022-08-29 DIAGNOSIS — I10 ESSENTIAL HYPERTENSION: ICD-10-CM

## 2022-08-29 DIAGNOSIS — G43.019 INTRACTABLE MIGRAINE WITHOUT AURA AND WITHOUT STATUS MIGRAINOSUS: ICD-10-CM

## 2022-08-29 DIAGNOSIS — R00.2 PALPITATIONS: Primary | ICD-10-CM

## 2022-08-29 PROCEDURE — 99214 OFFICE O/P EST MOD 30 MIN: CPT | Performed by: NURSE PRACTITIONER

## 2022-08-29 RX ORDER — VERAPAMIL HYDROCHLORIDE 180 MG/1
360 CAPSULE, EXTENDED RELEASE ORAL AT BEDTIME
Qty: 180 CAPSULE | Refills: 3 | Status: SHIPPED | OUTPATIENT
Start: 2022-08-29

## 2022-08-29 NOTE — LETTER
8/29/2022    Olga Wang PA-C  [address]  [facility] [address]    RE: Sonny Rush       Dear Colleague,     I had the pleasure of seeing Sonny Rush in the [facility].  Cardiology Clinic Progress Note  Sonny Rush MRN# [id number]   YOB: 1955 Age: 67 year old     Primary cardiologist: Dr. Lovelace     Reason for visit: follow-up on ziopatch results     History of presenting illness:    Sonny Rush is a pleasant 67 year old patient with past medical history significant for hypertension, chronic daily migraines, thyroiditis, depression, and anxiety who was recently referred to cardiology by her primary care provider for ongoing hypertension and palpitations.    She saw Dr. Lovelace on 7/26/2022 for cardiology consultation.  Patient reported new onset of palpitations over the last month, occurring most often at night on a daily basis, lasting only minutes.  A 14-day event monitor was recommended for further evaluation.  In regards to her hypertension, her verapamil was increased to 360 mg daily.    Echocardiogram from 5/11/2022, which I reviewed, shows preserved LV function with an EF of 55 to 60%, no wall motion abnormalities, and no significant valvular abnormalities.    I reviewed the results of her event monitor that showed predominant underlying rhythm was sinus rhythm.  There were 3 runs of SVT, the run with the fastest interval lasting 7 beats with a max heart rate of 184 bpm, the longest lasting 12 beats with an average heart rate of 154 bpm.  Ventricular bigeminy and trigeminy were present with a burden of less than 1%.  Her symptoms were not correlated with any arrhythmias.    Today the patient is here for follow-up.  We discussed findings from her cardiac monitor in detail.  She tells me she has felt much better over this past week without any recurrence of palpitations.  She otherwise denies chest pain, shortness of  breath, syncope or near syncope. No PND or orthopnea or lower extremity edema.     Her blood pressure is well controlled today at 123/70.         Assessment and Plan:     ASSESSMENT:    1. Palpitations.  No concerning arrhythmias on Zio patch, results noted above.  No recurrent symptoms.  2. Hypertension.  Well-controlled on verapamil 360 mg daily, chlorthalidone 25 mg daily, and losartan 100 mg daily.  3. Migraines. On verapamil and rizatriptan.   4. Depression and anxiety. On Wellbutrin XL daily, hydroxyzine and lorazepam as needed.        PLAN:     1. Patient appears to be doing well from a cardiovascular standpoint today with no recurrence of palpitations and her blood pressure is well controlled.  I have made no changes to her regimen today.  2. Follow-up with Dr. Daly in approximately 6 months, sooner if needed.         Shannon Cain DNP, APRN, CNP  Page: [phone] (8a-5p M-F)    Orders this Visit:  Orders Placed This Encounter   Procedures     Follow-Up with Cardiology     Orders Placed This Encounter   Medications     verapamil ER (VERELAN) 180 MG 24 hr capsule     Sig: Take 2 capsules (360 mg) by mouth At Bedtime     Dispense:  180 capsule     Refill:  3     Medications Discontinued During This Encounter   Medication Reason     verapamil ER (VERELAN) 180 MG 24 hr capsule Reorder       Today's clinic visit entailed:  Review of the result(s) of each unique test - ziopatch, echo  Ordering of each unique test  Prescription drug management  35 minutes spent on the date of the encounter doing chart review, review of test results, patient visit and documentation   Provider  Link to [facility] Help Grid     The level of medical decision making during this visit was of moderate complexity.           Review of Systems:     Review of Systems:  Skin:  not assessed     Eyes:  not assessed    ENT:  Positive for vertigo  Respiratory:  Positive for cough  Cardiovascular:  Negative chest pain;Positive  "for;fatigue;palpitations;lightheadedness;dizziness;syncope or near-syncope  Gastroenterology: not assessed    Genitourinary:  not assessed    Musculoskeletal:  not assessed    Neurologic:  not assessed    Psychiatric:  not assessed    Heme/Lymph/Imm:  Positive for allergies  Endocrine:  Positive for thyroid disorder            Physical Exam:   Vitals: /70   Pulse 72   Ht 1.676 m (5' 6\")   Wt 72.6 kg (160 lb)   SpO2 100%   BMI 25.82 kg/m    Constitutional:  cooperative, alert and oriented, well developed, well nourished, in no acute distress        Skin:  warm and dry to the touch        Head:  normocephalic        Eyes:  pupils equal and round        ENT:  not assessed this visit        Neck:  carotid pulses are full and equal bilaterally;JVP normal        Chest:  clear to auscultation        Cardiac: regular rhythm;normal S1 and S2;no murmurs, gallops or rubs detected                  Abdomen:  abdomen soft        Vascular: pulses full and equal                                      Extremities and Back:  no edema        Neurological:  affect appropriate;no gross motor deficits             Medications:     Current Outpatient Medications   Medication Sig Dispense Refill     buPROPion (WELLBUTRIN XL) 150 MG 24 hr tablet Take 150 mg by mouth daily       chlorthalidone (HYGROTON) 25 MG tablet Take 25 mg by mouth daily       CINNAMON PO Take 1 tablet by mouth daily Mixed with b vitamin       HydrOXYzine Pamoate (VISTARIL PO) Take 25 mg by mouth as needed       levothyroxine (SYNTHROID/LEVOTHROID) 88 MCG tablet Take 88 mcg by mouth daily       LORazepam (ATIVAN) 0.5 MG tablet Take 0.5 mg by mouth every 8 hours as needed       losartan (COZAAR) 100 MG tablet Take 100 mg by mouth daily       ondansetron (ZOFRAN-ODT) 4 MG ODT tab Take 4 mg by mouth every 8 hours as needed for nausea       rizatriptan (MAXALT-MLT) 10 MG ODT Take 1 tablet by mouth as needed       valACYclovir (VALTREX) 500 MG tablet Take 500 mg by " mouth as needed       verapamil ER (VERELAN) 180 MG 24 hr capsule Take 2 capsules (360 mg) by mouth At Bedtime 180 capsule 3     tiZANidine (ZANAFLEX) 2 MG tablet Take 1-3 tablets by mouth nightly as needed (Patient not taking: Reported on 2022)         Family History   Problem Relation Age of Onset     Breast Cancer Unknown         aunt     Hyperlipidemia Father      Hypertension Father      Ovarian Cancer Unknown      Uterine Cancer Unknown        Social History     Socioeconomic History     Marital status:      Spouse name: Virgilio     Number of children: 0     Years of education: Not on file     Highest education level: Not on file   Occupational History     Occupation:      Employer: [employer]   Tobacco Use     Smoking status: Former Smoker     Quit date: 7/3/2003     Years since quittin.1     Smokeless tobacco: Never Used   Substance and Sexual Activity     Alcohol use: Yes     Alcohol/week: 0.0 standard drinks     Drug use: No     Sexual activity: Never     Partners: Male     Birth control/protection: Post-menopausal   Other Topics Concern     Parent/sibling w/ CABG, MI or angioplasty before 65F 55M? Not Asked   Social History Narrative     Not on file     Social Determinants of Health     Financial Resource Strain: Not on file   Food Insecurity: Not on file   Transportation Needs: Not on file   Physical Activity: Not on file   Stress: Not on file   Social Connections: Not on file   Intimate Partner Violence: Not on file   Housing Stability: Not on file            Past Medical History:     Past Medical History:   Diagnosis Date     Anxiety      Malignant neoplasm (H)      Meniere's disease      Migraines     ON NEURONTIN, sees neurologist     Mild depression (H)     sees psych     PMB (postmenopausal bleeding)     Had  D & C  showing inactive endometrium              Past Surgical History:     Past Surgical History:   Procedure Laterality Date     BIOPSY BREAST SEED  LOCALIZATION  7/3/2012    Procedure: BIOPSY BREAST SEED LOCALIZATION;  RIGHT BREAST LUMPECTOMY WITH ULTRASOUND SEED LOCALIZATION;  Surgeon: Jaclyn Dalal MD;  Location:  [facility]     BREAST BIOPSY, RT/LT  11/29/00    stereotactic bx right breast--fibrocystic change     COLONOSCOPY N/A 1/30/2017    Procedure: COMBINED COLONOSCOPY, SINGLE OR MULTIPLE BIOPSY/POLYPECTOMY BY BIOPSY;  Surgeon: Muriel Wolff MD;  Location:  GI     DILATION AND CURETTAGE  2/27/06    inactive endometrium on path; done for PMB     ENT SURGERY      ear tubes, rhinoplasty     LAPAROSCOPY      Age 27 for pelvic pain     ORTHOPEDIC SURGERY      hammertoe              Allergies:   Codeine sulfate, Depakote, Niacin, and Prednisone       Data:   All laboratory data reviewed:    No lab results found.    Invalid input(s): CMP, CBC    Lab Results   Component Value Date    WBC 5.8 02/07/2007    RBC 4.38 02/07/2007    HGB 13.8 02/07/2007    HCT 39.6 02/07/2007    MCV 91 02/07/2007    MCH 31.5 02/07/2007    MCHC 34.7 02/07/2007    RDW 12.8 02/07/2007     02/07/2007       Lab Results   Component Value Date     02/07/2007    POTASSIUM 3.2 (L) 02/07/2007    CHLORIDE 103 05/18/2022    CHLORIDE 106 02/07/2007    CO2 28 02/07/2007    ANIONGAP 8 02/07/2007    GLC 90 02/07/2007    BUN 15 02/07/2007    CR 1.13 02/07/2007    GFRESTIMATED 54 (L) 02/07/2007    GFRESTBLACK 65 02/07/2007    DARRICK 10.2 02/07/2007      Lab Results   Component Value Date    AST 29 02/07/2007    ALT 33 02/07/2007       No results found for: A1C    No results found for: INR    Thank you for allowing me to participate in the care of your patient.      Sincerely,   Shannon Cain, [facility]  cc:   Rika Lovelace,   [address]  [place] [address]

## 2022-08-29 NOTE — PROGRESS NOTES
Cardiology Clinic Progress Note  Sonny Rush MRN# [id number]   YOB: 1955 Age: 67 year old     Primary cardiologist: Dr. Lovelace     Reason for visit: follow-up on ziopatch results     History of presenting illness:    Sonny Rush is a pleasant 67 year old patient with past medical history significant for hypertension, chronic daily migraines, thyroiditis, depression, and anxiety who was recently referred to cardiology by her primary care provider for ongoing hypertension and palpitations.    She saw Dr. Lovelace on 7/26/2022 for cardiology consultation.  Patient reported new onset of palpitations over the last month, occurring most often at night on a daily basis, lasting only minutes.  A 14-day event monitor was recommended for further evaluation.  In regards to her hypertension, her verapamil was increased to 360 mg daily.    Echocardiogram from 5/11/2022, which I reviewed, shows preserved LV function with an EF of 55 to 60%, no wall motion abnormalities, and no significant valvular abnormalities.    I reviewed the results of her event monitor that showed predominant underlying rhythm was sinus rhythm.  There were 3 runs of SVT, the run with the fastest interval lasting 7 beats with a max heart rate of 184 bpm, the longest lasting 12 beats with an average heart rate of 154 bpm.  Ventricular bigeminy and trigeminy were present with a burden of less than 1%.  Her symptoms were not correlated with any arrhythmias.    Today the patient is here for follow-up.  We discussed findings from her cardiac monitor in detail.  She tells me she has felt much better over this past week without any recurrence of palpitations.  She otherwise denies chest pain, shortness of breath, syncope or near syncope. No PND or orthopnea or lower extremity edema.     Her blood pressure is well controlled today at 123/70.         Assessment and Plan:     ASSESSMENT:    1. Palpitations.  No concerning arrhythmias  on Zio patch, results noted above.  No recurrent symptoms.  2. Hypertension.  Well-controlled on verapamil 360 mg daily, chlorthalidone 25 mg daily, and losartan 100 mg daily.  3. Migraines. On verapamil and rizatriptan.   4. Depression and anxiety. On Wellbutrin XL daily, hydroxyzine and lorazepam as needed.        PLAN:     1. Patient appears to be doing well from a cardiovascular standpoint today with no recurrence of palpitations and her blood pressure is well controlled.  I have made no changes to her regimen today.  2. Follow-up with Dr. Daly in approximately 6 months, sooner if needed.         Shannon Cain, DNP, APRN, CNP  Page: [phone] (8a-5p M-F)    Orders this Visit:  Orders Placed This Encounter   Procedures     Follow-Up with Cardiology     Orders Placed This Encounter   Medications     verapamil ER (VERELAN) 180 MG 24 hr capsule     Sig: Take 2 capsules (360 mg) by mouth At Bedtime     Dispense:  180 capsule     Refill:  3     Medications Discontinued During This Encounter   Medication Reason     verapamil ER (VERELAN) 180 MG 24 hr capsule Reorder       Today's clinic visit entailed:  Review of the result(s) of each unique test - ziopatch, echo  Ordering of each unique test  Prescription drug management  35 minutes spent on the date of the encounter doing chart review, review of test results, patient visit and documentation   Provider  Link to [facility] Help Grid     The level of medical decision making during this visit was of moderate complexity.           Review of Systems:     Review of Systems:  Skin:  not assessed     Eyes:  not assessed    ENT:  Positive for vertigo  Respiratory:  Positive for cough  Cardiovascular:  Negative chest pain;Positive for;fatigue;palpitations;lightheadedness;dizziness;syncope or near-syncope  Gastroenterology: not assessed    Genitourinary:  not assessed    Musculoskeletal:  not assessed    Neurologic:  not assessed    Psychiatric:  not assessed    Heme/Lymph/Imm:   "Positive for allergies  Endocrine:  Positive for thyroid disorder            Physical Exam:   Vitals: /70   Pulse 72   Ht 1.676 m (5' 6\")   Wt 72.6 kg (160 lb)   SpO2 100%   BMI 25.82 kg/m    Constitutional:  cooperative, alert and oriented, well developed, well nourished, in no acute distress        Skin:  warm and dry to the touch        Head:  normocephalic        Eyes:  pupils equal and round        ENT:  not assessed this visit        Neck:  carotid pulses are full and equal bilaterally;JVP normal        Chest:  clear to auscultation        Cardiac: regular rhythm;normal S1 and S2;no murmurs, gallops or rubs detected                  Abdomen:  abdomen soft        Vascular: pulses full and equal                                      Extremities and Back:  no edema        Neurological:  affect appropriate;no gross motor deficits             Medications:     Current Outpatient Medications   Medication Sig Dispense Refill     buPROPion (WELLBUTRIN XL) 150 MG 24 hr tablet Take 150 mg by mouth daily       chlorthalidone (HYGROTON) 25 MG tablet Take 25 mg by mouth daily       CINNAMON PO Take 1 tablet by mouth daily Mixed with b vitamin       HydrOXYzine Pamoate (VISTARIL PO) Take 25 mg by mouth as needed       levothyroxine (SYNTHROID/LEVOTHROID) 88 MCG tablet Take 88 mcg by mouth daily       LORazepam (ATIVAN) 0.5 MG tablet Take 0.5 mg by mouth every 8 hours as needed       losartan (COZAAR) 100 MG tablet Take 100 mg by mouth daily       ondansetron (ZOFRAN-ODT) 4 MG ODT tab Take 4 mg by mouth every 8 hours as needed for nausea       rizatriptan (MAXALT-MLT) 10 MG ODT Take 1 tablet by mouth as needed       valACYclovir (VALTREX) 500 MG tablet Take 500 mg by mouth as needed       verapamil ER (VERELAN) 180 MG 24 hr capsule Take 2 capsules (360 mg) by mouth At Bedtime 180 capsule 3     tiZANidine (ZANAFLEX) 2 MG tablet Take 1-3 tablets by mouth nightly as needed (Patient not taking: Reported on 8/29/2022)   "       Family History   Problem Relation Age of Onset     Breast Cancer Unknown         aunt     Hyperlipidemia Father      Hypertension Father      Ovarian Cancer Unknown      Uterine Cancer Unknown        Social History     Socioeconomic History     Marital status:      Spouse name: Virgilio     Number of children: 0     Years of education: Not on file     Highest education level: Not on file   Occupational History     Occupation:      Employer: [employer]   Tobacco Use     Smoking status: Former Smoker     Quit date: 7/3/2003     Years since quittin.1     Smokeless tobacco: Never Used   Substance and Sexual Activity     Alcohol use: Yes     Alcohol/week: 0.0 standard drinks     Drug use: No     Sexual activity: Never     Partners: Male     Birth control/protection: Post-menopausal   Other Topics Concern     Parent/sibling w/ CABG, MI or angioplasty before 65F 55M? Not Asked   Social History Narrative     Not on file     Social Determinants of Health     Financial Resource Strain: Not on file   Food Insecurity: Not on file   Transportation Needs: Not on file   Physical Activity: Not on file   Stress: Not on file   Social Connections: Not on file   Intimate Partner Violence: Not on file   Housing Stability: Not on file            Past Medical History:     Past Medical History:   Diagnosis Date     Anxiety      Malignant neoplasm (H)      Meniere's disease      Migraines     ON NEURONTIN, sees neurologist     Mild depression (H)     sees psych     PMB (postmenopausal bleeding)     Had  D & C  showing inactive endometrium              Past Surgical History:     Past Surgical History:   Procedure Laterality Date     BIOPSY BREAST SEED LOCALIZATION  7/3/2012    Procedure: BIOPSY BREAST SEED LOCALIZATION;  RIGHT BREAST LUMPECTOMY WITH ULTRASOUND SEED LOCALIZATION;  Surgeon: Jaclyn Dalal MD;  Location: [facility]     BREAST BIOPSY, RT/LT  00    stereotactic bx right breast--fibrocystic  change     COLONOSCOPY N/A 1/30/2017    Procedure: COMBINED COLONOSCOPY, SINGLE OR MULTIPLE BIOPSY/POLYPECTOMY BY BIOPSY;  Surgeon: Muriel Wolff MD;  Location:  GI     DILATION AND CURETTAGE  2/27/06    inactive endometrium on path; done for PMB     ENT SURGERY      ear tubes, rhinoplasty     LAPAROSCOPY      Age 27 for pelvic pain     ORTHOPEDIC SURGERY      hammertoe              Allergies:   Codeine sulfate, Depakote, Niacin, and Prednisone       Data:   All laboratory data reviewed:    No lab results found.    Invalid input(s): CMP, CBC    Lab Results   Component Value Date    WBC 5.8 02/07/2007    RBC 4.38 02/07/2007    HGB 13.8 02/07/2007    HCT 39.6 02/07/2007    MCV 91 02/07/2007    MCH 31.5 02/07/2007    MCHC 34.7 02/07/2007    RDW 12.8 02/07/2007     02/07/2007       Lab Results   Component Value Date     02/07/2007    POTASSIUM 3.2 (L) 02/07/2007    CHLORIDE 103 05/18/2022    CHLORIDE 106 02/07/2007    CO2 28 02/07/2007    ANIONGAP 8 02/07/2007    GLC 90 02/07/2007    BUN 15 02/07/2007    CR 1.13 02/07/2007    GFRESTIMATED 54 (L) 02/07/2007    GFRESTBLACK 65 02/07/2007    DARRICK 10.2 02/07/2007      Lab Results   Component Value Date    AST 29 02/07/2007    ALT 33 02/07/2007       No results found for: A1C    No results found for: INR

## 2022-08-29 NOTE — PATIENT INSTRUCTIONS
Today's Plan:     1) No concerning findings on your cardiac monitor and your blood pressure looks much better on the increased dose of verapamil.     2) Follow-up with Dr. Daly in approximately 6 months, sooner if needed.     If you have questions or concerns please call my nurse team at [phone].       Scheduling phone number: [phone]    Reminder: Please bring in all current medications, over the counter supplements and vitamin bottles to your next appointment.-    It was a pleasure seeing you today!     Shannon Cain CNP  8/29/2022    -

## 2022-08-31 ENCOUNTER — VIRTUAL VISIT (OUTPATIENT)
Dept: PHARMACY | Facility: PHYSICIAN GROUP | Age: 67
End: 2022-08-31
Payer: COMMERCIAL

## 2022-08-31 DIAGNOSIS — I10 ESSENTIAL HYPERTENSION: Primary | ICD-10-CM

## 2022-08-31 DIAGNOSIS — F41.8 DEPRESSION WITH ANXIETY: ICD-10-CM

## 2022-08-31 PROCEDURE — 99207 PR NO CHARGE LOS: CPT | Performed by: PHARMACIST

## 2022-08-31 NOTE — PROGRESS NOTES
Medication Therapy Management (MTM) Encounter    ASSESSMENT:                            Medication Adherence/Access: No issues identified    Hypertension: Stable. BP has been at goal at recent office visit and home monitoring mostly in range under <140/90mmHg. Agree with cutting back monitoring to 1-2 times per day.     Depression: Stable.    PLAN:                            1. No change in medication at this time.     Follow-up: Return in 6 weeks (on 10/12/2022) for Phone call with MTM.    SUBJECTIVE/OBJECTIVE:                          Sonny Rush is a 67 year old female called for a follow-up visit.  Today's visit is a follow-up MTM visit from 8/5     Reason for visit: medication follow up.    Allergies/ADRs: Reviewed in chart  Past Medical History: Reviewed in chart  Tobacco: She reports that she quit smoking about 19 years ago. She has never used smokeless tobacco.  Alcohol: not currently using    Medication Adherence/Access: no issues reported    Hypertension: Current medications include chlorthalidone 25mg AM, losartan 100mg in the evening now and verapamil ER 360mg (for migraines and increased by cardiology).     7/26-8/7 wore Zio patch. Had evaluation/follow up on results on 8/29- no significant findings. Has next cardiologist in Feb.     Multiple medication changes previously to help with blood pressure control and minimize side effects (see prior notes).   Have been avoiding using beta blockers due to her depression and concern for increased fatigue, so has been reluctant to use these. History of lower sodium levels, but last checked 7/27/22 and WNL. Scr does jump between 1.2 and 1.5.   Drinking lots of water to stay hydrated.      Depression/Anxiety: Bupropion ER 300mg daily (specific brand of generic due to tolerability) and is back up on dosage, which has been working better for her.   As noted above in hypertension, concern for anxiety impacting hypertension, however she feels this medication has been  very beneficial for her depression and does not feel that it is contributing to anxiety.   Has Hydroxyzine as needed, but rarely using this and Lorazepam very sparingly   Lexapro was tried before, but then stopped working.   Psych since 2006 Dr. Parr private practice.     ----------------    I spent 14 minutes with this patient today. All changes were made via collaborative practice agreement with Olga Wang PA-C. A copy of the visit note was provided to the patient's provider(s).    The patient declined a summary of these recommendations.     Alida Borjas, Pharm.D, [facility]  Medication Therapy Management Pharmacist  [phone]    Telemedicine Visit Details  Type of service:  Telephone visit  Start Time: 1:32 PM  End Time: 1:46 PM  Originating Location (patient location): [place]  Distant Location (provider location):  [facility] MTM     Medication Therapy Recommendations  Depression with anxiety    Current Medication: buPROPion (WELLBUTRIN XL) 300 MG 24 hr tablet   Rationale: Synergistic therapy - Needs additional medication therapy - Indication   Recommendation: Start Medication - Lexapro 5 MG Tabs - start 5mg daily   Status: Declined per Patient

## 2022-08-31 NOTE — PATIENT INSTRUCTIONS
"Recommendations from today's MTM visit:                                                       1. No change in medication at this time.     Follow-up: Return in 6 weeks (on 10/12/2022) for Phone call with MTM.    It was great speaking with you today.  I value your experience and would be very thankful for your time in providing feedback in our clinic survey. In the next few days, you may receive an email or text message from [facility] [employer] with a link to a survey related to your  clinical pharmacist.\"     My Clinical Pharmacist's contact information:                                                      Please feel free to contact me with any questions or concerns you have.      Alida Borjas, Pharm.D, BCACP  Medication Therapy Management Pharmacist  [phone]     "

## 2022-09-11 ENCOUNTER — APPOINTMENT (OUTPATIENT)
Dept: CT IMAGING | Facility: CLINIC | Age: 67
End: 2022-09-11
Attending: EMERGENCY MEDICINE
Payer: COMMERCIAL

## 2022-09-11 ENCOUNTER — HOSPITAL ENCOUNTER (EMERGENCY)
Facility: CLINIC | Age: 67
Discharge: HOME | End: 2022-09-11
Attending: EMERGENCY MEDICINE | Admitting: EMERGENCY MEDICINE
Payer: COMMERCIAL

## 2022-09-11 VITALS
SYSTOLIC BLOOD PRESSURE: 155 MMHG | HEART RATE: 74 BPM | TEMPERATURE: 97 F | RESPIRATION RATE: 18 BRPM | DIASTOLIC BLOOD PRESSURE: 95 MMHG | OXYGEN SATURATION: 97 % | BODY MASS INDEX: 25.82 KG/M2 | WEIGHT: 160 LBS

## 2022-09-11 DIAGNOSIS — S20.212A CONTUSION OF RIB ON LEFT SIDE, INITIAL ENCOUNTER: ICD-10-CM
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
## 2022-09-11 DIAGNOSIS — R91.8 PULMONARY NODULES: ICD-10-CM

## 2022-09-11 LAB
ALBUMIN UR-MCNC: NEGATIVE MG/DL
ANION GAP SERPL CALCULATED.3IONS-SCNC: 8 MMOL/L (ref 3–14)
APPEARANCE UR: CLEAR
BASOPHILS # BLD AUTO: 0.1 10E3/UL (ref 0–0.2)
BASOPHILS NFR BLD AUTO: 1 %
BILIRUB UR QL STRIP: NEGATIVE
BUN SERPL-MCNC: 21 MG/DL (ref 7–30)
CALCIUM SERPL-MCNC: 8.8 MG/DL (ref 8.5–10.1)
CHLORIDE BLD-SCNC: 98 MMOL/L (ref 94–109)
CO2 SERPL-SCNC: 24 MMOL/L (ref 20–32)
COLOR UR AUTO: ABNORMAL
CREAT SERPL-MCNC: 1.31 MG/DL (ref 0.52–1.04)
EOSINOPHIL # BLD AUTO: 0.4 10E3/UL (ref 0–0.7)
EOSINOPHIL NFR BLD AUTO: 4 %
ERYTHROCYTE [DISTWIDTH] IN BLOOD BY AUTOMATED COUNT: 11.8 % (ref 10–15)
GFR SERPL CREATININE-BSD FRML MDRD: 44 ML/MIN/1.73M2
GLUCOSE BLD-MCNC: 106 MG/DL (ref 70–99)
GLUCOSE UR STRIP-MCNC: NEGATIVE MG/DL
HCT VFR BLD AUTO: 36.4 % (ref 35–47)
HGB BLD-MCNC: 12.7 G/DL (ref 11.7–15.7)
HGB UR QL STRIP: NEGATIVE
IMM GRANULOCYTES # BLD: 0 10E3/UL
IMM GRANULOCYTES NFR BLD: 1 %
KETONES UR STRIP-MCNC: ABNORMAL MG/DL
LEUKOCYTE ESTERASE UR QL STRIP: NEGATIVE
LYMPHOCYTES # BLD AUTO: 1.9 10E3/UL (ref 0.8–5.3)
LYMPHOCYTES NFR BLD AUTO: 22 %
MCH RBC QN AUTO: 32 PG (ref 26.5–33)
MCHC RBC AUTO-ENTMCNC: 34.9 G/DL (ref 31.5–36.5)
MCV RBC AUTO: 92 FL (ref 78–100)
MONOCYTES # BLD AUTO: 0.8 10E3/UL (ref 0–1.3)
MONOCYTES NFR BLD AUTO: 10 %
MUCOUS THREADS #/AREA URNS LPF: PRESENT /LPF
NEUTROPHILS # BLD AUTO: 5.4 10E3/UL (ref 1.6–8.3)
NEUTROPHILS NFR BLD AUTO: 62 %
NITRATE UR QL: NEGATIVE
NRBC # BLD AUTO: 0 10E3/UL
NRBC BLD AUTO-RTO: 0 /100
PH UR STRIP: 5.5 [PH] (ref 5–7)
PLATELET # BLD AUTO: 304 10E3/UL (ref 150–450)
POTASSIUM BLD-SCNC: 3.6 MMOL/L (ref 3.4–5.3)
RBC # BLD AUTO: 3.97 10E6/UL (ref 3.8–5.2)
RBC URINE: 0 /HPF
SODIUM SERPL-SCNC: 130 MMOL/L (ref 133–144)
SP GR UR STRIP: 1.01 (ref 1–1.03)
SQUAMOUS EPITHELIAL: <1 /HPF
UROBILINOGEN UR STRIP-MCNC: NORMAL MG/DL
WBC # BLD AUTO: 8.5 10E3/UL (ref 4–11)
WBC URINE: 0 /HPF

## 2022-09-11 PROCEDURE — 99285 EMERGENCY DEPT VISIT HI MDM: CPT | Mod: 25

## 2022-09-11 PROCEDURE — 80048 BASIC METABOLIC PNL TOTAL CA: CPT | Performed by: EMERGENCY MEDICINE

## 2022-09-11 PROCEDURE — 81001 URINALYSIS AUTO W/SCOPE: CPT | Performed by: EMERGENCY MEDICINE

## 2022-09-11 PROCEDURE — 250N000011 HC RX IP 250 OP 636: Performed by: EMERGENCY MEDICINE

## 2022-09-11 PROCEDURE — 36415 COLL VENOUS BLD VENIPUNCTURE: CPT | Performed by: EMERGENCY MEDICINE

## 2022-09-11 PROCEDURE — 96374 THER/PROPH/DIAG INJ IV PUSH: CPT | Mod: 59

## 2022-09-11 PROCEDURE — 74177 CT ABD & PELVIS W/CONTRAST: CPT

## 2022-09-11 PROCEDURE — 85025 COMPLETE CBC W/AUTO DIFF WBC: CPT | Performed by: EMERGENCY MEDICINE

## 2022-09-11 PROCEDURE — 250N000013 HC RX MED GY IP 250 OP 250 PS 637: Performed by: EMERGENCY MEDICINE

## 2022-09-11 PROCEDURE — 250N000009 HC RX 250: Performed by: EMERGENCY MEDICINE

## 2022-09-11 RX ORDER — OXYCODONE HYDROCHLORIDE 5 MG/1
5 TABLET ORAL EVERY 6 HOURS PRN
Qty: 10 TABLET | Refills: 0 | Status: SHIPPED | OUTPATIENT
Start: 2022-09-11 | End: 2022-09-14

## 2022-09-11 RX ORDER — HYDROMORPHONE HYDROCHLORIDE 1 MG/ML
0.5 INJECTION, SOLUTION INTRAMUSCULAR; INTRAVENOUS; SUBCUTANEOUS
Status: COMPLETED | OUTPATIENT
Start: 2022-09-11 | End: 2022-09-11

## 2022-09-11 RX ORDER — IOPAMIDOL 755 MG/ML
81 INJECTION, SOLUTION INTRAVASCULAR ONCE
Status: COMPLETED | OUTPATIENT
Start: 2022-09-11 | End: 2022-09-11

## 2022-09-11 RX ORDER — OXYCODONE HYDROCHLORIDE 5 MG/1
10 TABLET ORAL ONCE
Status: COMPLETED | OUTPATIENT
Start: 2022-09-11 | End: 2022-09-11

## 2022-09-11 RX ADMIN — IOPAMIDOL 81 ML: 755 INJECTION, SOLUTION INTRAVENOUS at 18:26

## 2022-09-11 RX ADMIN — HYDROMORPHONE HYDROCHLORIDE 0.5 MG: 1 INJECTION, SOLUTION INTRAMUSCULAR; INTRAVENOUS; SUBCUTANEOUS at 17:31

## 2022-09-11 RX ADMIN — SODIUM CHLORIDE 65 ML: 9 INJECTION, SOLUTION INTRAVENOUS at 18:26

## 2022-09-11 RX ADMIN — OXYCODONE HYDROCHLORIDE 10 MG: 5 TABLET ORAL at 19:40

## 2022-09-11 ASSESSMENT — ENCOUNTER SYMPTOMS
NECK PAIN: 0
HEADACHES: 0
ABDOMINAL PAIN: 0

## 2022-09-11 ASSESSMENT — ACTIVITIES OF DAILY LIVING (ADL): ADLS_ACUITY_SCORE: 35

## 2022-09-11 NOTE — ED TRIAGE NOTES
Patient slipped down bottom 3 stairs and fell into a piece of furniture injuring left side flank and rib area.     Triage Assessment     Row Name 09/11/22 2810       Respiratory WDL    Respiratory WDL X       Cardiac WDL    Cardiac WDL X  HTN       Cognitive/Neuro/Behavioral WDL    Cognitive/Neuro/Behavioral WDL WDL

## 2022-09-11 NOTE — ED PROVIDER NOTES
History   Chief Complaint:  Fall and Rib Injury     The history is provided by the patient.      Sonny Rush is a 67 year old female with history of hypertension who presents for evaluation after a fall. The patient reports that she was walking into her basement at about 1430 when she missed a step and fell into furniture. She states that she did hit her head but denies loss of consciousness and headache, stating that the pain is the worst in her left side surrounding her ribs. She denies any neck pain, abdominal pain, and syncope.     Review of Systems   Cardiovascular: Positive for chest pain (left sided ribs).   Gastrointestinal: Negative for abdominal pain.   Musculoskeletal: Negative for neck pain.   Neurological: Negative for syncope and headaches.   All other systems reviewed and are negative.      Allergies:  Codeine Sulfate  Depakote  Niacin  Prednisone    Medications:  Verelan   Valtrex   Zanaflex   Maxalt  Zofran   Cozaar   Ativan   Levothyroxine   Vistaril   Hygroton   Wellbutrin     Past Medical History:     Hypertension   Hashimoto's thyroiditis   Breast cancer   Arthritis   Meniere disease     Past Surgical History:    Rhinoplasty   Dilation and curettage   Laparoscopy      Family History:    Heart attack   Heart disease     Social History:  The patient presents to the ED with her .  PCP: Olga Wang     Physical Exam     Patient Vitals for the past 24 hrs:   BP Temp Temp src Pulse Resp SpO2 Weight   09/11/22 1946 (!) 155/95 -- -- 74 18 97 % --   09/11/22 1713 (!) 178/101 97  F (36.1  C) Temporal 71 18 97 % 72.6 kg (160 lb)     Physical Exam  Eyes:  The pupils are equal and round    Conjunctivae and sclerae are normal  ENT:    The nose is normal    Pinnae are normal    The oropharynx is normal  Neck:  Normal range of motion    There is no rigidity noted    There is no midline cervical spine tenderness    Trachea is in the midline  CV:  Regular rate and rhythm     No  edema  Resp:  Lungs are clear    Non-labored    No rales    No wheezing   GI:  Abdomen is soft with tenderness of the left side, there is no rigidity    No distension    No rebound tenderness   MS:  Normal muscular tone    No asymmetric leg swelling    Left chest wall tenderness    No midline back  tenderness  Skin:  No rash or acute skin lesions noted  Neuro:   Awake, alert.      Speech is normal and fluent.    Face is symmetric.     Moves all extremities    SILT in bilateral lower extremities    Normal dorsiflexion and plantarflexion bilaterally    Emergency Department Course   Imaging:  CT Chest/Abdomen/Pelvis w Contrast   Final Result   IMPRESSION:   1.  No acute/traumatic injury identified.   2.  1.7 cm peripheral cavitary nodule left lower lobe concerning for malignancy though may be postinflammatory in nature. Consider follow-up PET/CT and pulmonary referral.        Report per radiology    Laboratory:  Labs Ordered and Resulted from Time of ED Arrival to Time of ED Departure   BASIC METABOLIC PANEL - Abnormal       Result Value    Sodium 130 (*)     Potassium 3.6      Chloride 98      Carbon Dioxide (CO2) 24      Anion Gap 8      Urea Nitrogen 21      Creatinine 1.31 (*)     Calcium 8.8      Glucose 106 (*)     GFR Estimate 44 (*)    ROUTINE UA WITH MICROSCOPIC REFLEX TO CULTURE - Abnormal    Color Urine Light Yellow      Appearance Urine Clear      Glucose Urine Negative      Bilirubin Urine Negative      Ketones Urine Trace (*)     Specific Gravity Urine 1.011      Blood Urine Negative      pH Urine 5.5      Protein Albumin Urine Negative      Urobilinogen Urine Normal      Nitrite Urine Negative      Leukocyte Esterase Urine Negative      Mucus Urine Present (*)     RBC Urine 0      WBC Urine 0      Squamous Epithelials Urine <1     CBC WITH PLATELETS AND DIFFERENTIAL    WBC Count 8.5      RBC Count 3.97      Hemoglobin 12.7      Hematocrit 36.4      MCV 92      MCH 32.0      MCHC 34.9      RDW 11.8       Platelet Count 304      % Neutrophils 62      % Lymphocytes 22      % Monocytes 10      % Eosinophils 4      % Basophils 1      % Immature Granulocytes 1      NRBCs per 100 WBC 0      Absolute Neutrophils 5.4      Absolute Lymphocytes 1.9      Absolute Monocytes 0.8      Absolute Eosinophils 0.4      Absolute Basophils 0.1      Absolute Immature Granulocytes 0.0      Absolute NRBCs 0.0        Emergency Department Course:     Reviewed:  I reviewed nursing notes, vitals and past medical history    Assessments:  1715 I obtained history and examined the patient as noted above.   1913 I rechecked the patient and explained findings.     Interventions:  1731 Dilaudid 0.5 mg IV   1940 Roxicodone 10 mg oral     Disposition:  The patient was discharged to home.     Impression & Plan   Medical Decision Making:  Sonny Rush is a 67 year old female who presents to the emergency department with a fall.  She fell into a piece of furniture at the bottom of her stairs.  She thinks that she missed the last 2 or 3 steps.  She had her left side against a piece of furniture.  She did fall to the ground and did hit her head but did not lose consciousness.  She has not had any vomiting.  No significant headache but does have a lot of pain on her left side.  Given the location of her pain and possible rib fractures and splenic or kidney injury CT scan of the chest abdomen pelvis was obtained to evaluate for abnormalities and fortunately was negative for any acute abnormalities.  There was however a nodule in her lung which I reviewed with her and the importance of following up as an outpatient for further evaluation due to concern for possible malignancy.  Discussed use of pain medication at home for pain control.  Incentive spirometer given.  Follow-up with primary care provider in the next several days.  Return to the emergency department with any new or worrisome symptoms.    Diagnosis:    ICD-10-CM    1. Contusion of rib on  left side, initial encounter  S20.212A    2. Fall, initial encounter  W19.XXXA    3. Pulmonary nodules  R91.8 Adult Oncology/Hematology  Referral       Discharge Medications:  Discharge Medication List as of 9/11/2022  7:31 PM      START taking these medications    Details   oxyCODONE (ROXICODONE) 5 MG tablet Take 1 tablet (5 mg) by mouth every 6 hours as needed for pain, Disp-10 tablet, R-0, Local Print             Scribe Disclosure:  I, Beny Carrillo, am serving as a scribe at 5:13 PM on 9/11/2022 to document services personally performed by Moses Zafar MD based on my observations and the provider's statements to me.          Moses Zafar MD  09/12/22 0016

## 2022-09-12 ENCOUNTER — PATIENT OUTREACH (OUTPATIENT)
Dept: CARE COORDINATION | Facility: CLINIC | Age: 67
End: 2022-09-12

## 2022-09-12 NOTE — DISCHARGE INSTRUCTIONS

## 2022-09-12 NOTE — PROGRESS NOTES
Clinic Care Coordination Contact    Situation: Patient chart reviewed by care coordinator.    Background: Sonny Rush is a 67 year old female with history of hypertension who presents for evaluation after a fall    Assessment: 1.7 cm peripheral cavitary nodule left lower lobe concerning for malignancy though may be postinflammatory in nature. Consider follow-up PET/CT and pulmonary referral.    Plan/Recommendations:follow up with PCP within 7 days, consider CT/Pet scan with pulminology.     [facility] updated PCP, CC will not up up CC at this time.

## 2022-09-22 ENCOUNTER — OFFICE VISIT (OUTPATIENT)
Dept: PULMONOLOGY | Facility: CLINIC | Age: 67
End: 2022-09-22
Attending: PHYSICIAN ASSISTANT
Payer: COMMERCIAL

## 2022-09-22 VITALS
BODY MASS INDEX: 26.36 KG/M2 | TEMPERATURE: 98.2 F | WEIGHT: 164 LBS | HEIGHT: 66 IN | DIASTOLIC BLOOD PRESSURE: 77 MMHG | OXYGEN SATURATION: 100 % | HEART RATE: 72 BPM | SYSTOLIC BLOOD PRESSURE: 153 MMHG

## 2022-09-22 DIAGNOSIS — R91.8 PULMONARY NODULES: Primary | ICD-10-CM

## 2022-09-22 DIAGNOSIS — R91.8 PULMONARY NODULES: ICD-10-CM

## 2022-09-22 PROCEDURE — 99204 OFFICE O/P NEW MOD 45 MIN: CPT | Mod: 25 | Performed by: INTERNAL MEDICINE

## 2022-09-22 PROCEDURE — 94729 DIFFUSING CAPACITY: CPT | Performed by: INTERNAL MEDICINE

## 2022-09-22 PROCEDURE — G0463 HOSPITAL OUTPT CLINIC VISIT: HCPCS

## 2022-09-22 PROCEDURE — 94726 PLETHYSMOGRAPHY LUNG VOLUMES: CPT | Performed by: INTERNAL MEDICINE

## 2022-09-22 PROCEDURE — 94375 RESPIRATORY FLOW VOLUME LOOP: CPT | Performed by: INTERNAL MEDICINE

## 2022-09-22 NOTE — NURSING NOTE
"Oncology Rooming Note    September 22, 2022 9:51 AM   Sonny Rush is a 67 year old female who presents for:    Chief Complaint   Patient presents with     Oncology Clinic Visit     New eval for pulmonary nodules     Initial Vitals: BP (!) 153/77   Pulse 72   Temp 98.2  F (36.8  C) (Oral)   Ht 1.676 m (5' 6\")   Wt 74.4 kg (164 lb)   SpO2 100%   BMI 26.47 kg/m   Estimated body mass index is 26.47 kg/m  as calculated from the following:    Height as of this encounter: 1.676 m (5' 6\").    Weight as of this encounter: 74.4 kg (164 lb). Body surface area is 1.86 meters squared.  Data Unavailable Comment: Data Unavailable   No LMP recorded. Patient is postmenopausal.  Allergies reviewed: Yes  Medications reviewed: Yes    Medications: Medication refills not needed today.  Pharmacy name entered into [employer]:    [id number] [place], [place] - [address] [id number] [place], [place] - [address] AT [address]    Clinical concerns: Patient has no specific questions or clinical concerns outside of the reason for the visit.       Diane Rosa, EMT            "

## 2022-09-22 NOTE — LETTER
9/22/2022       RE: Sonny Rush  [address]  [facility] [address]     Dear Colleague,    Thank you for referring your patient, Sonny Rush, to the [facility] at [facility]. Please see a copy of my visit note below.    [facility]  Interventional Pulmonary Clinic Visit Note    September 22, 2022    Chief complaint:  Sonny Rush is a 67 year old female seen for   Chief Complaint   Patient presents with     Oncology Clinic Visit     New eval for pulmonary nodules       Reason for clinic visit / Chief complaint:   Pulmonary nodule    Assessment and Plan:  Indeterminate pulmonary nodule, left lower lobe, cavitary, irregular borders, noncalcified.  No previous CT scans available for comparison.  She had a PET/CT scan in 2012 when she was diagnosed of breast cancer however I could not locate that image.  Chest x-ray from 2007 did not reveal any abnormality to my review.  I have personally reviewed her current CT scan of the chest.  We will discuss her case in our multidisciplinary meeting and call her back tomorrow.  We discussed options of CT-guided biopsy and/or resection.  I have also personally reviewed her PFTs revealing hyperinflation and air trapping otherwise normal spirometric indicis and diffusing capacity.    History of breast cancer treated with lumpectomy and radiation treatment (right breast).      History of Present Illness:  This is a 67 years old woman with no known pulmonary problems no history of exposure except for radiation treatment for right breast in 2012 recently fell and ended up having CT scan of the chest and abdomen/pelvis and found to have cavitary lesion and referred to our clinic for further evaluation.  She has no respiratory symptoms.  Bronchitis in the past but no pneumonia that she could remember.  Breast cancer 2012, lumpectomy, XRT    Smoking: quit 30 years ago after  smoking socially for a year  Bronchitis  Paternal grandfather lung cancer  Exposure: none             Allergies   Allergen Reactions     Codeine Sulfate      Cough syrup     Depakote      Niacin      Prednisone         Past Medical History:   Diagnosis Date     Anxiety      Malignant neoplasm (H)      Meniere's disease      Migraines     ON NEURONTIN, sees neurologist     Mild depression (H)     sees psych     PMB (postmenopausal bleeding)     Had  D & C  showing inactive endometrium        Past Surgical History:   Procedure Laterality Date     BIOPSY BREAST SEED LOCALIZATION  7/3/2012    Procedure: BIOPSY BREAST SEED LOCALIZATION;  RIGHT BREAST LUMPECTOMY WITH ULTRASOUND SEED LOCALIZATION;  Surgeon: Jaclyn Dalal MD;  Location:  [facility]     BREAST BIOPSY, RT/LT  00    stereotactic bx right breast--fibrocystic change     COLONOSCOPY N/A 2017    Procedure: COMBINED COLONOSCOPY, SINGLE OR MULTIPLE BIOPSY/POLYPECTOMY BY BIOPSY;  Surgeon: Muriel Wolff MD;  Location:  GI     DILATION AND CURETTAGE  06    inactive endometrium on path; done for PMB     ENT SURGERY      ear tubes, rhinoplasty     LAPAROSCOPY      Age 27 for pelvic pain     ORTHOPEDIC SURGERY      [facility]        Social History     Socioeconomic History     Marital status:      Spouse name: Virgilio     Number of children: 0     Years of education: Not on file     Highest education level: Not on file   Occupational History     Occupation:      Employer: [employer]   Tobacco Use     Smoking status: Former Smoker     Quit date: 7/3/2003     Years since quittin.2     Smokeless tobacco: Never Used   Substance and Sexual Activity     Alcohol use: Yes     Alcohol/week: 0.0 standard drinks     Drug use: No     Sexual activity: Never     Partners: Male     Birth control/protection: Post-menopausal   Other Topics Concern     Parent/sibling w/ CABG, MI or angioplasty before 65F 55M? Not Asked   Social History  Narrative     Not on file     Social Determinants of Health     Financial Resource Strain: Not on file   Food Insecurity: Not on file   Transportation Needs: Not on file   Physical Activity: Not on file   Stress: Not on file   Social Connections: Not on file   Intimate Partner Violence: Not on file   Housing Stability: Not on file        Family History   Problem Relation Age of Onset     Breast Cancer Unknown         aunt     Hyperlipidemia Father      Hypertension Father      Ovarian Cancer Unknown      Uterine Cancer Unknown         Immunization History   Administered Date(s) Administered     COVID-19,PF,Pfizer 12+ Yrs (2022 and After) 05/12/2022       Current Outpatient Medications   Medication Sig     buPROPion (WELLBUTRIN XL) 300 MG 24 hr tablet Take 300 mg by mouth daily     chlorthalidone (HYGROTON) 25 MG tablet Take 25 mg by mouth daily     CINNAMON PO Take 1 tablet by mouth daily Mixed with b vitamin     HydrOXYzine Pamoate (VISTARIL PO) Take 25 mg by mouth as needed     levothyroxine (SYNTHROID/LEVOTHROID) 88 MCG tablet Take 88 mcg by mouth daily     LORazepam (ATIVAN) 0.5 MG tablet Take 0.5 mg by mouth every 8 hours as needed     losartan (COZAAR) 100 MG tablet Take 100 mg by mouth daily     ondansetron (ZOFRAN-ODT) 4 MG ODT tab Take 4 mg by mouth every 8 hours as needed for nausea     rizatriptan (MAXALT-MLT) 10 MG ODT Take 1 tablet by mouth as needed     tiZANidine (ZANAFLEX) 2 MG tablet Take 1-3 tablets by mouth nightly as needed     valACYclovir (VALTREX) 500 MG tablet Take 500 mg by mouth as needed     verapamil ER (VERELAN) 180 MG 24 hr capsule Take 2 capsules (360 mg) by mouth At Bedtime     No current facility-administered medications for this visit.        Review of Systems:  I have done 10 points of review systems and all negative except for those mentioned in HPI    Physical examination  Constitutional: Oriented, not in distress  BP (!) 153/77   Pulse 72   Temp 98.2  F (36.8  C) (Oral)   Ht  "1.676 m (5' 6\")   Wt 74.4 kg (164 lb)   SpO2 100%   BMI 26.47 kg/m    Eyes: No icterus, nystagmus, pupils isocoric  Head and neck: normal posture and movements  Respiratory: Normal tidal breathing, no shortness of breath, no audible wheezing or stridor   Musculoskeletal: Normal muscle mass, no deformity on hands/fingers  Integumentary:  No rash on visible skin areas   Neurological: Alert, orientedx3, no motor deficits  Psychiatric:  Mood and affect are appropriate with insight into his/her medical condition    Data:  Lab Results   Component Value Date    WBC 8.5 09/11/2022    WBC 5.8 02/07/2007     Lab Results   Component Value Date    RBC 3.97 09/11/2022    RBC 4.38 02/07/2007     Lab Results   Component Value Date    HGB 12.7 09/11/2022    HGB 13.8 02/07/2007     Lab Results   Component Value Date    HCT 36.4 09/11/2022    HCT 39.6 02/07/2007     Lab Results   Component Value Date    MCV 92 09/11/2022    MCV 91 02/07/2007     Lab Results   Component Value Date    MCH 32.0 09/11/2022    MCH 31.5 02/07/2007     Lab Results   Component Value Date    MCHC 34.9 09/11/2022    MCHC 34.7 02/07/2007     Lab Results   Component Value Date    RDW 11.8 09/11/2022    RDW 12.8 02/07/2007     Lab Results   Component Value Date     09/11/2022     02/07/2007       Lab Results   Component Value Date     09/11/2022     02/07/2007      Lab Results   Component Value Date    POTASSIUM 3.6 09/11/2022    POTASSIUM 3.2 02/07/2007     Lab Results   Component Value Date    CHLORIDE 98 09/11/2022    CHLORIDE 103 05/18/2022    CHLORIDE 106 02/07/2007     Lab Results   Component Value Date    DARRICK 8.8 09/11/2022    DARRICK 10.2 02/07/2007     Lab Results   Component Value Date    CO2 24 09/11/2022    CO2 28 02/07/2007     Lab Results   Component Value Date    BUN 21 09/11/2022    BUN 15 02/07/2007     Lab Results   Component Value Date    CR 1.31 09/11/2022    CR 1.13 02/07/2007     Lab Results   Component Value Date    "  09/11/2022    GLC 90 02/07/2007         NABIL Templeton MD      Again, thank you for allowing me to participate in the care of your patient.      Sincerely,    Hany Templeton MD

## 2022-09-22 NOTE — TELEPHONE ENCOUNTER
Pulmonary Nodule Conference      Patient Name: Sonny Rush    Reason for conference discussion (brief overview):   68 y/o lady with history of breast cancer diagnosed in 2012 treated with lumpectomy and radiation treatment. She recently fell and had a CT scan of the chest revealing left lower lobe pleural-based cavitary lesion concerning for malignancy.  I discussed options with her including CT scan follow-up, CT-guided biopsy or resection.  She is very anxious about the nodule because of her previous cancer history.     Her PFTs are fine in my note.     Specific Question:  Can IR do a CT-guided biopsy, and or should we consider resection?    Pertinent Histology:  History of breast cancer    Referring Physician: Sunshine Templeton MD    The patient's case was presented at the multidisciplinary conference for the above noted reason.  There was a consensus recommendation for the following actions:     CT guided biopsy with IR will be the first step.  Possible wedge biopsy as a second resort.        Case Lead: Shy Mathtews, RNCC (Interventional Pulmonology)      Interventional Radiology Staff Present: Aleksandr Patel PA-C

## 2022-09-22 NOTE — LETTER
9/22/2022       RE: Sonny Rush  [address]  [facility] [address]     Dear Colleague,    Thank you for referring your patient, Sonny Rush, to the [facility] at [facility]. Please see a copy of my visit note below.    [facility]  Interventional Pulmonary Clinic Visit Note    September 22, 2022    Chief complaint:  Sonny Rush is a 67 year old female seen for   Chief Complaint   Patient presents with     Oncology Clinic Visit     New eval for pulmonary nodules       Reason for clinic visit / Chief complaint:   Pulmonary nodule    Assessment and Plan:  Indeterminate pulmonary nodule, left lower lobe, cavitary, irregular borders, noncalcified.  No previous CT scans available for comparison.  She had a PET/CT scan in 2012 when she was diagnosed of breast cancer however I could not locate that image.  Chest x-ray from 2007 did not reveal any abnormality to my review.  I have personally reviewed her current CT scan of the chest.  We will discuss her case in our multidisciplinary meeting and call her back tomorrow.  We discussed options of CT-guided biopsy and/or resection.  I have also personally reviewed her PFTs revealing hyperinflation and air trapping otherwise normal spirometric indicis and diffusing capacity.    History of breast cancer treated with lumpectomy and radiation treatment (right breast).      History of Present Illness:  This is a 67 years old woman with no known pulmonary problems no history of exposure except for radiation treatment for right breast in 2012 recently fell and ended up having CT scan of the chest and abdomen/pelvis and found to have cavitary lesion and referred to our clinic for further evaluation.  She has no respiratory symptoms.  Bronchitis in the past but no pneumonia that she could remember.  Breast cancer 2012, lumpectomy, XRT    Smoking: quit 30 years ago after  smoking socially for a year  Bronchitis  Paternal grandfather lung cancer  Exposure: none             Allergies   Allergen Reactions     Codeine Sulfate      Cough syrup     Depakote      Niacin      Prednisone         Past Medical History:   Diagnosis Date     Anxiety      Malignant neoplasm (H)      Meniere's disease      Migraines     ON NEURONTIN, sees neurologist     Mild depression (H)     sees psych     PMB (postmenopausal bleeding)     Had  D & C  showing inactive endometrium        Past Surgical History:   Procedure Laterality Date     BIOPSY BREAST SEED LOCALIZATION  7/3/2012    Procedure: BIOPSY BREAST SEED LOCALIZATION;  RIGHT BREAST LUMPECTOMY WITH ULTRASOUND SEED LOCALIZATION;  Surgeon: Jaclyn Dalal MD;  Location:  [facility]     BREAST BIOPSY, RT/LT  00    stereotactic bx right breast--fibrocystic change     COLONOSCOPY N/A 2017    Procedure: COMBINED COLONOSCOPY, SINGLE OR MULTIPLE BIOPSY/POLYPECTOMY BY BIOPSY;  Surgeon: Muriel Wolff MD;  Location:  GI     DILATION AND CURETTAGE  06    inactive endometrium on path; done for PMB     ENT SURGERY      ear tubes, rhinoplasty     LAPAROSCOPY      Age 27 for pelvic pain     ORTHOPEDIC SURGERY      [facility]        Social History     Socioeconomic History     Marital status:      Spouse name: Virgilio     Number of children: 0     Years of education: Not on file     Highest education level: Not on file   Occupational History     Occupation:      Employer: [employer]   Tobacco Use     Smoking status: Former Smoker     Quit date: 7/3/2003     Years since quittin.2     Smokeless tobacco: Never Used   Substance and Sexual Activity     Alcohol use: Yes     Alcohol/week: 0.0 standard drinks     Drug use: No     Sexual activity: Never     Partners: Male     Birth control/protection: Post-menopausal   Other Topics Concern     Parent/sibling w/ CABG, MI or angioplasty before 65F 55M? Not Asked   Social History  Narrative     Not on file     Social Determinants of Health     Financial Resource Strain: Not on file   Food Insecurity: Not on file   Transportation Needs: Not on file   Physical Activity: Not on file   Stress: Not on file   Social Connections: Not on file   Intimate Partner Violence: Not on file   Housing Stability: Not on file        Family History   Problem Relation Age of Onset     Breast Cancer Unknown         aunt     Hyperlipidemia Father      Hypertension Father      Ovarian Cancer Unknown      Uterine Cancer Unknown         Immunization History   Administered Date(s) Administered     COVID-19,PF,Pfizer 12+ Yrs (2022 and After) 05/12/2022       Current Outpatient Medications   Medication Sig     buPROPion (WELLBUTRIN XL) 300 MG 24 hr tablet Take 300 mg by mouth daily     chlorthalidone (HYGROTON) 25 MG tablet Take 25 mg by mouth daily     CINNAMON PO Take 1 tablet by mouth daily Mixed with b vitamin     HydrOXYzine Pamoate (VISTARIL PO) Take 25 mg by mouth as needed     levothyroxine (SYNTHROID/LEVOTHROID) 88 MCG tablet Take 88 mcg by mouth daily     LORazepam (ATIVAN) 0.5 MG tablet Take 0.5 mg by mouth every 8 hours as needed     losartan (COZAAR) 100 MG tablet Take 100 mg by mouth daily     ondansetron (ZOFRAN-ODT) 4 MG ODT tab Take 4 mg by mouth every 8 hours as needed for nausea     rizatriptan (MAXALT-MLT) 10 MG ODT Take 1 tablet by mouth as needed     tiZANidine (ZANAFLEX) 2 MG tablet Take 1-3 tablets by mouth nightly as needed     valACYclovir (VALTREX) 500 MG tablet Take 500 mg by mouth as needed     verapamil ER (VERELAN) 180 MG 24 hr capsule Take 2 capsules (360 mg) by mouth At Bedtime     No current facility-administered medications for this visit.        Review of Systems:  I have done 10 points of review systems and all negative except for those mentioned in HPI    Physical examination  Constitutional: Oriented, not in distress  BP (!) 153/77   Pulse 72   Temp 98.2  F (36.8  C) (Oral)   Ht  "1.676 m (5' 6\")   Wt 74.4 kg (164 lb)   SpO2 100%   BMI 26.47 kg/m    Eyes: No icterus, nystagmus, pupils isocoric  Head and neck: normal posture and movements  Respiratory: Normal tidal breathing, no shortness of breath, no audible wheezing or stridor   Musculoskeletal: Normal muscle mass, no deformity on hands/fingers  Integumentary:  No rash on visible skin areas   Neurological: Alert, orientedx3, no motor deficits  Psychiatric:  Mood and affect are appropriate with insight into his/her medical condition    Data:  Lab Results   Component Value Date    WBC 8.5 09/11/2022    WBC 5.8 02/07/2007     Lab Results   Component Value Date    RBC 3.97 09/11/2022    RBC 4.38 02/07/2007     Lab Results   Component Value Date    HGB 12.7 09/11/2022    HGB 13.8 02/07/2007     Lab Results   Component Value Date    HCT 36.4 09/11/2022    HCT 39.6 02/07/2007     Lab Results   Component Value Date    MCV 92 09/11/2022    MCV 91 02/07/2007     Lab Results   Component Value Date    MCH 32.0 09/11/2022    MCH 31.5 02/07/2007     Lab Results   Component Value Date    MCHC 34.9 09/11/2022    MCHC 34.7 02/07/2007     Lab Results   Component Value Date    RDW 11.8 09/11/2022    RDW 12.8 02/07/2007     Lab Results   Component Value Date     09/11/2022     02/07/2007       Lab Results   Component Value Date     09/11/2022     02/07/2007      Lab Results   Component Value Date    POTASSIUM 3.6 09/11/2022    POTASSIUM 3.2 02/07/2007     Lab Results   Component Value Date    CHLORIDE 98 09/11/2022    CHLORIDE 103 05/18/2022    CHLORIDE 106 02/07/2007     Lab Results   Component Value Date    DARRICK 8.8 09/11/2022    DARRICK 10.2 02/07/2007     Lab Results   Component Value Date    CO2 24 09/11/2022    CO2 28 02/07/2007     Lab Results   Component Value Date    BUN 21 09/11/2022    BUN 15 02/07/2007     Lab Results   Component Value Date    CR 1.31 09/11/2022    CR 1.13 02/07/2007     Lab Results   Component Value Date    "  09/11/2022    GLC 90 02/07/2007         NABIL Templeton MD

## 2022-09-22 NOTE — PROGRESS NOTES
[facility]  Interventional Pulmonary Clinic Visit Note    September 22, 2022    Chief complaint:  Sonny Rush is a 67 year old female seen for   Chief Complaint   Patient presents with     Oncology Clinic Visit     New eval for pulmonary nodules       Reason for clinic visit / Chief complaint:   Pulmonary nodule    Assessment and Plan:  Indeterminate pulmonary nodule, left lower lobe, cavitary, irregular borders, noncalcified.  No previous CT scans available for comparison.  She had a PET/CT scan in 2012 when she was diagnosed of breast cancer however I could not locate that image.  Chest x-ray from 2007 did not reveal any abnormality to my review.  I have personally reviewed her current CT scan of the chest.  We will discuss her case in our multidisciplinary meeting and call her back tomorrow.  We discussed options of CT-guided biopsy and/or resection.  I have also personally reviewed her PFTs revealing hyperinflation and air trapping otherwise normal spirometric indicis and diffusing capacity.    History of breast cancer treated with lumpectomy and radiation treatment (right breast).      History of Present Illness:  This is a 67 years old woman with no known pulmonary problems no history of exposure except for radiation treatment for right breast in 2012 recently fell and ended up having CT scan of the chest and abdomen/pelvis and found to have cavitary lesion and referred to our clinic for further evaluation.  She has no respiratory symptoms.  Bronchitis in the past but no pneumonia that she could remember.  Breast cancer 2012, lumpectomy, XRT    Smoking: quit 30 years ago after smoking socially for a year  Bronchitis  Paternal grandfather lung cancer  Exposure: none             Allergies   Allergen Reactions     Codeine Sulfate      Cough syrup     Depakote      Niacin      Prednisone         Past Medical History:   Diagnosis Date     Anxiety      Malignant neoplasm (H)      Meniere's disease       Migraines     ON NEURONTIN, sees neurologist     Mild depression (H)     sees psych     PMB (postmenopausal bleeding)     Had  D & C  showing inactive endometrium        Past Surgical History:   Procedure Laterality Date     BIOPSY BREAST SEED LOCALIZATION  7/3/2012    Procedure: BIOPSY BREAST SEED LOCALIZATION;  RIGHT BREAST LUMPECTOMY WITH ULTRASOUND SEED LOCALIZATION;  Surgeon: Jaclyn Dalal MD;  Location:  [facility]     BREAST BIOPSY, RT/LT  00    stereotactic bx right breast--fibrocystic change     COLONOSCOPY N/A 2017    Procedure: COMBINED COLONOSCOPY, SINGLE OR MULTIPLE BIOPSY/POLYPECTOMY BY BIOPSY;  Surgeon: Muriel Wolff MD;  Location:  GI     DILATION AND CURETTAGE  06    inactive endometrium on path; done for PMB     ENT SURGERY      ear tubes, rhinoplasty     LAPAROSCOPY      Age 27 for pelvic pain     ORTHOPEDIC SURGERY      hammertoe        Social History     Socioeconomic History     Marital status:      Spouse name: Virgilio     Number of children: 0     Years of education: Not on file     Highest education level: Not on file   Occupational History     Occupation:      Employer: [employer]   Tobacco Use     Smoking status: Former Smoker     Quit date: 7/3/2003     Years since quittin.2     Smokeless tobacco: Never Used   Substance and Sexual Activity     Alcohol use: Yes     Alcohol/week: 0.0 standard drinks     Drug use: No     Sexual activity: Never     Partners: Male     Birth control/protection: Post-menopausal   Other Topics Concern     Parent/sibling w/ CABG, MI or angioplasty before 65F 55M? Not Asked   Social History Narrative     Not on file     Social Determinants of Health     Financial Resource Strain: Not on file   Food Insecurity: Not on file   Transportation Needs: Not on file   Physical Activity: Not on file   Stress: Not on file   Social Connections: Not on file   Intimate Partner Violence: Not on file   Housing Stability: Not  "on file        Family History   Problem Relation Age of Onset     Breast Cancer Unknown         aunt     Hyperlipidemia Father      Hypertension Father      Ovarian Cancer Unknown      Uterine Cancer Unknown         Immunization History   Administered Date(s) Administered     COVID-19,MADHURI,Pfizer 12+ Yrs (2022 and After) 05/12/2022       Current Outpatient Medications   Medication Sig     buPROPion (WELLBUTRIN XL) 300 MG 24 hr tablet Take 300 mg by mouth daily     chlorthalidone (HYGROTON) 25 MG tablet Take 25 mg by mouth daily     CINNAMON PO Take 1 tablet by mouth daily Mixed with b vitamin     HydrOXYzine Pamoate (VISTARIL PO) Take 25 mg by mouth as needed     levothyroxine (SYNTHROID/LEVOTHROID) 88 MCG tablet Take 88 mcg by mouth daily     LORazepam (ATIVAN) 0.5 MG tablet Take 0.5 mg by mouth every 8 hours as needed     losartan (COZAAR) 100 MG tablet Take 100 mg by mouth daily     ondansetron (ZOFRAN-ODT) 4 MG ODT tab Take 4 mg by mouth every 8 hours as needed for nausea     rizatriptan (MAXALT-MLT) 10 MG ODT Take 1 tablet by mouth as needed     tiZANidine (ZANAFLEX) 2 MG tablet Take 1-3 tablets by mouth nightly as needed     valACYclovir (VALTREX) 500 MG tablet Take 500 mg by mouth as needed     verapamil ER (VERELAN) 180 MG 24 hr capsule Take 2 capsules (360 mg) by mouth At Bedtime     No current facility-administered medications for this visit.        Review of Systems:  I have done 10 points of review systems and all negative except for those mentioned in HPI    Physical examination  Constitutional: Oriented, not in distress  BP (!) 153/77   Pulse 72   Temp 98.2  F (36.8  C) (Oral)   Ht 1.676 m (5' 6\")   Wt 74.4 kg (164 lb)   SpO2 100%   BMI 26.47 kg/m    Eyes: No icterus, nystagmus, pupils isocoric  Head and neck: normal posture and movements  Respiratory: Normal tidal breathing, no shortness of breath, no audible wheezing or stridor   Musculoskeletal: Normal muscle mass, no deformity on " hands/fingers  Integumentary:  No rash on visible skin areas   Neurological: Alert, orientedx3, no motor deficits  Psychiatric:  Mood and affect are appropriate with insight into his/her medical condition    Data:  Lab Results   Component Value Date    WBC 8.5 09/11/2022    WBC 5.8 02/07/2007     Lab Results   Component Value Date    RBC 3.97 09/11/2022    RBC 4.38 02/07/2007     Lab Results   Component Value Date    HGB 12.7 09/11/2022    HGB 13.8 02/07/2007     Lab Results   Component Value Date    HCT 36.4 09/11/2022    HCT 39.6 02/07/2007     Lab Results   Component Value Date    MCV 92 09/11/2022    MCV 91 02/07/2007     Lab Results   Component Value Date    MCH 32.0 09/11/2022    MCH 31.5 02/07/2007     Lab Results   Component Value Date    MCHC 34.9 09/11/2022    MCHC 34.7 02/07/2007     Lab Results   Component Value Date    RDW 11.8 09/11/2022    RDW 12.8 02/07/2007     Lab Results   Component Value Date     09/11/2022     02/07/2007       Lab Results   Component Value Date     09/11/2022     02/07/2007      Lab Results   Component Value Date    POTASSIUM 3.6 09/11/2022    POTASSIUM 3.2 02/07/2007     Lab Results   Component Value Date    CHLORIDE 98 09/11/2022    CHLORIDE 103 05/18/2022    CHLORIDE 106 02/07/2007     Lab Results   Component Value Date    DARRICK 8.8 09/11/2022    DARRICK 10.2 02/07/2007     Lab Results   Component Value Date    CO2 24 09/11/2022    CO2 28 02/07/2007     Lab Results   Component Value Date    BUN 21 09/11/2022    BUN 15 02/07/2007     Lab Results   Component Value Date    CR 1.31 09/11/2022    CR 1.13 02/07/2007     Lab Results   Component Value Date     09/11/2022    GLC 90 02/07/2007         NABIL Templeton MD

## 2022-09-23 ENCOUNTER — TEAM CONFERENCE (OUTPATIENT)
Dept: PULMONOLOGY | Facility: CLINIC | Age: 67
End: 2022-09-23

## 2022-09-23 DIAGNOSIS — R91.8 PULMONARY NODULES: Primary | ICD-10-CM

## 2022-09-23 LAB
DLCOCOR-%PRED-PRE: 106 %
DLCOCOR-PRE: 21.97 ML/MIN/MMHG
DLCOUNC-%PRED-PRE: 103 %
DLCOUNC-PRE: 21.49 ML/MIN/MMHG
DLCOUNC-PRED: 20.71 ML/MIN/MMHG
ERV-%PRED-PRE: 78 %
ERV-PRE: 0.55 L
ERV-PRED: 0.7 L
EXPTIME-PRE: 6.68 SEC
FEF2575-%PRED-PRE: 87 %
FEF2575-PRE: 1.74 L/SEC
FEF2575-PRED: 1.98 L/SEC
FEFMAX-%PRED-PRE: 53 %
FEFMAX-PRE: 3.18 L/SEC
FEFMAX-PRED: 5.99 L/SEC
FEV1-%PRED-PRE: 97 %
FEV1-PRE: 2.3 L
FEV1FEV6-PRE: 70 %
FEV1FEV6-PRED: 79 %
FEV1FVC-PRE: 70 %
FEV1FVC-PRED: 78 %
FEV1SVC-PRE: 65 %
FEV1SVC-PRED: 72 %
FIFMAX-PRE: 4.48 L/SEC
FRCPLETH-%PRED-PRE: 126 %
FRCPLETH-PRE: 3.56 L
FRCPLETH-PRED: 2.81 L
FVC-%PRED-PRE: 107 %
FVC-PRE: 3.3 L
FVC-PRED: 3.06 L
IC-%PRED-PRE: 115 %
IC-PRE: 2.99 L
IC-PRED: 2.6 L
RVPLETH-%PRED-PRE: 141 %
RVPLETH-PRE: 3.01 L
RVPLETH-PRED: 2.13 L
TLCPLETH-%PRED-PRE: 125 %
TLCPLETH-PRE: 6.56 L
TLCPLETH-PRED: 5.23 L
VA-%PRED-PRE: 103 %
VA-PRE: 5.25 L
VC-%PRED-PRE: 107 %
VC-PRE: 3.55 L
VC-PRED: 3.3 L

## 2022-09-23 NOTE — CONSULTS
Impression: Posterior left lower lobe cavitary pulmonary nodule, history of breast cancer in 2012    Plan: CT-guided left lower lobe lung nodule biopsy with moderate sedation  Risk for non-diagnostic sample, forced to obtain wall of cavitary lesion, Dincer aware  Surg path, Afb stain, Afb culture, Koh prep, fungal culture  8 hours NPO prior  No meds to hold  PA clinic visit prior    HPI: Breast cancer in 2012, lumpectomy and radiation. Recently fell prompting CT which shows new left lower lobe cavitary lesion. Discussed at nodule conference, IR asked to biopsy due to concern for malignancy. Ewa approves.

## 2022-09-23 NOTE — TELEPHONE ENCOUNTER
Patient called back and was informed of the recommendation for a CT-guided needle biopsy.  She is in agreement.  Also talked to her about the possibility of a surgical biopsy in the future if this one comes back non-diagnostic.  She verbalized understanding.

## 2022-09-23 NOTE — TELEPHONE ENCOUNTER
LVM for patient to call this writer back to discuss the recommendation from lung nodule conference.  Provided direct contact info.    Shy Matthews RN Care Coordinator   Interventional Pulmonology  Phone: [phone]  Fax: [phone]

## 2022-09-26 ENCOUNTER — MYC MEDICAL ADVICE (OUTPATIENT)
Dept: INTERVENTIONAL RADIOLOGY/VASCULAR | Facility: CLINIC | Age: 67
End: 2022-09-26

## 2022-09-26 NOTE — TELEPHONE ENCOUNTER
VMM left at primary contact number with detailed instructions for planned procedure on 10/5/22.  I have requested patient contact IR to confirm understanding of plan of care and that she has received instructions on MYCHART.    IR phone number provided in [facility].  Aubree FORRESTER  Interventional Radiology RN   [phone]

## 2022-09-27 NOTE — TELEPHONE ENCOUNTER
Received call from patient. Acknowledges interclickt instructions for lung biopsy @ [facility] on 10/5. Reviewed expectations for procedure and results will be given by referring/ordering provider when available (up to 1 week at times). Patient plans for home covid test on Monday 10/3. No other questions or concerns.    Nano Berg RN  Interventional Radiology  [phone]

## 2022-09-29 ENCOUNTER — VIRTUAL VISIT (OUTPATIENT)
Dept: INTERVENTIONAL RADIOLOGY/VASCULAR | Facility: CLINIC | Age: 67
End: 2022-09-29
Payer: COMMERCIAL

## 2022-09-29 ENCOUNTER — TELEPHONE (OUTPATIENT)
Dept: INTERVENTIONAL RADIOLOGY/VASCULAR | Facility: CLINIC | Age: 67
End: 2022-09-29

## 2022-09-29 DIAGNOSIS — R91.8 PULMONARY NODULES: Primary | ICD-10-CM

## 2022-09-29 PROCEDURE — 99204 OFFICE O/P NEW MOD 45 MIN: CPT | Mod: TEL | Performed by: PHYSICIAN ASSISTANT

## 2022-09-29 NOTE — TELEPHONE ENCOUNTER
[facility] Call Center    Phone Message    May a detailed message be left on voicemail: yes     Reason for Call: Other: Patient states she missed her phone appointment today because the call came at 8:30 and she was expecting the call at 9. Patient would like a call back.     Action Taken: Message routed to:  [facility]    Travel Screening: Not Applicable

## 2022-09-29 NOTE — TELEPHONE ENCOUNTER
Called and clarified with Pt that visit was completed.  IR PA just called her a little early and left VMs were from our virtual facilitator who preps visit. Nothing further needed.  Pt confirmed she received all the information she needed about upcoming procedure and she has no further questions pertaining to that. Pt verbalized understanding, agreed to current plan and denied any further questions.    Daisy Krishnan LPN

## 2022-09-29 NOTE — PROGRESS NOTES
INTERVENTIONAL RADIOLOGY CLINIC NOTE  09/29/22    Referring Provider:  Sunshine Tmepleton MD  Approved IR procedure:  CT-guided left lower lobe lung nodule biopsy with moderate sedation.  Send Surg path, Afb stain, Afb culture, Koh prep, fungal culture.      -Original IR referral note on 9/23/2022 from Aleksandr Patel PA-C/Dr. Teddy Mcneil MD which was also presented at pulmonary nodule conference on 9/23/2022.      -Risk for non diagnostic sample, and forced to obtain wall of cavity lesion was discussed at conference with Dr. Templeton present. Follow-up telephone note from Shy Matthews on 9/23/22 indicated the possibility of surgical biopsy in future if non diagnostic sample.      Impression:  Sonny Rush is a 67 year old female with history of right breast cancer 2012 treated with lumpectomy and radiation who had a fall with CT chest AP 9/11/2022 demonstrating 1.7 cm peripheral cavitary nodule in the left lower lobe of the lung that is c/f malignancy or post inflammatory process.      Able to lay flat or prone or on side.      Denies any issues with fentanyl or versed, sedation in past, fever or chills.  Last dose of baby asa today 9/29/2022 for appointment time scheduled 10/5/2022    Significant Medications:      Biopsy procedure along with risks, benefits of the procedure as well as how pathology results will be communicated have been discussed with the patient    Risk of lung biopsy was discussed which includes but is not limited to post procedure pain, bleeding, infection, injury to adjacent vessels or organ systems, non diagnostic sample that may require repeat biopsy, injury to the lung that may require chest tube placement and overnight admission with potential for specialist consultation/intervention.     Patient is encouraged to pack overnight bag in the event that admission to hospital is needed.      All questions and concerns are addressed.  Patient verbalized understanding of procedure and  instructions.   Formal written consent to be obtained the day of procedure.     Imaging Reviewed:        Khalida Emerson PA-C  Interventional Radiology  [phone] (IR control)  [phone] (pager)    =====    Past Medical History:  Past Medical History:   Diagnosis Date     Anxiety      Malignant neoplasm (H)      Meniere's disease      Migraines     ON NEURONTIN, sees neurologist     Mild depression (H)     sees psych     PMB (postmenopausal bleeding) 2006    Had  D & C 2/06 showing inactive endometrium     Past Surgical History:  Past Surgical History:   Procedure Laterality Date     BIOPSY BREAST SEED LOCALIZATION  7/3/2012    Procedure: BIOPSY BREAST SEED LOCALIZATION;  RIGHT BREAST LUMPECTOMY WITH ULTRASOUND SEED LOCALIZATION;  Surgeon: Jaclyn Dalal MD;  Location:  [facility]     BREAST BIOPSY, RT/LT  11/29/00    stereotactic bx right breast--fibrocystic change     COLONOSCOPY N/A 1/30/2017    Procedure: COMBINED COLONOSCOPY, SINGLE OR MULTIPLE BIOPSY/POLYPECTOMY BY BIOPSY;  Surgeon: Muriel Wolff MD;  Location:  GI     DILATION AND CURETTAGE  2/27/06    inactive endometrium on path; done for PMB     ENT SURGERY      ear tubes, rhinoplasty     LAPAROSCOPY      Age 27 for pelvic pain     ORTHOPEDIC SURGERY      [facility]     Problem List:  Patient Active Problem List    Diagnosis Date Noted     Closed displaced avulsion fracture of right talus, initial encounter 02/13/2019     Priority: Medium     Elevated blood pressure reading without diagnosis of hypertension 02/13/2019     Priority: Medium     Medications:  Prescription Medications as of 9/29/2022       Rx Number Disp Refills Start End Last Dispensed Date Next Fill Date Owning Pharmacy    buPROPion (WELLBUTRIN XL) 300 MG 24 hr tablet    5/16/2022        Sig: Take 300 mg by mouth daily    Class: Historical    Route: Oral    chlorthalidone (HYGROTON) 25 MG tablet            Sig: Take 25 mg by mouth daily    Class: Historical    Route: Oral     CINNAMON PO            Sig: Take 1 tablet by mouth daily Mixed with b vitamin    Class: Historical    Route: Oral    HydrOXYzine Pamoate (VISTARIL PO)            Sig: Take 25 mg by mouth as needed    Class: Historical    Route: Oral    levothyroxine (SYNTHROID/LEVOTHROID) 88 MCG tablet            Sig: Take 88 mcg by mouth daily    Class: Historical    Route: Oral    LORazepam (ATIVAN) 0.5 MG tablet            Sig: Take 0.5 mg by mouth every 8 hours as needed    Class: Historical    Route: Oral    losartan (COZAAR) 100 MG tablet            Sig: Take 100 mg by mouth daily    Class: Historical    Route: Oral    ondansetron (ZOFRAN-ODT) 4 MG ODT [id number] [address] AT [address]    Sig: Take 4 mg by mouth every 8 hours as needed for nausea    Class: Historical    Route: Oral    rizatriptan (MAXALT-MLT) 10 MG ODT    4/23/2022        Sig: Take 1 tablet by mouth as needed    Class: Historical    Route: Oral    tiZANidine (ZANAFLEX) 2 MG tablet    2/1/2022        Sig: Take 1-3 tablets by mouth nightly as needed    Class: Historical    Route: Oral    valACYclovir (VALTREX) 500 MG tablet            Sig: Take 500 mg by mouth as needed    Class: Historical    Route: Oral    verapamil ER (VERELAN) 180 MG 24 hr capsule  180 capsule 3 8/29/2022    [facility]/pharmacy #[id number] - [facility] [address]    Sig: Take 2 capsules (360 mg) by mouth At Bedtime    Class: E-Prescribe    Route: Oral          Allergies:  Allergies   Allergen Reactions     Codeine Sulfate      Cough syrup     Depakote      Niacin      Prednisone        Results Reviewed:  Lab Results   Component Value Date     09/11/2022     02/07/2007     No results found for: INR    Vital Signs:  There were no vitals taken for this visit.    =====    Visit Details:    The patient's medical record and pertinent imaging were reviewed.      Education was given on the planned procedure and why  it was requested, including a detailed description of interventional radiology's role.      The risks of the procedure were discussed.     The use of moderate sedation was reviewed.     All of the patient's questions were answered to their satisfaction.    Patient instructions:    The procedure will be performed in IR at the [facility] located at [address]. Arrive in the Gold Waiting room at your scheduled arrival time on the day of your procedure.    No solid foods or milk products 8 hours prior to the procedure.  You may have clear liquids including coffee (without cream or sugar) up to 2 hours prior to the procedure.    If sedation is planned, a responsible adult must accompany you after the procedure.  Hospital policy requires you not drive 24 hours after sedation is administered.    Parking is available via [employer] or [employer].      Medicines to hold:    Lovenox (enoxaparin) is generally held for 24 hours prior to invasive procedures.  If taken twice daily, hold the evening dose prior to the procedure as well as the morning dose the day of the procedure.    Coumadin (warfarin) is generally held for 5 days prior to the scheduled procedure, or when the INR meets established IR safety laboratory parameters.    Anti-platelet inhibitors (not including aspirin) are held for 5 days prior to the scheduled procedure.    Aspirin is held for 5 days prior to the scheduled procedure.    =====    Phone call Duration:  20 minutes  Chart Review which includes medical history, imaging, labs, medications, allergies, telephone visit and documentation:  45 minutes        CC:  1) Referring Provider  Isak Patel PA-C  [address]  [place] [address]    2) Primary Care Provider  Olga Wang PA-C  [address]  [place] [address]

## 2022-10-05 ENCOUNTER — APPOINTMENT (OUTPATIENT)
Dept: MEDSURG UNIT | Facility: CLINIC | Age: 67
End: 2022-10-05
Attending: INTERNAL MEDICINE
Payer: COMMERCIAL

## 2022-10-05 ENCOUNTER — APPOINTMENT (OUTPATIENT)
Dept: INTERVENTIONAL RADIOLOGY/VASCULAR | Facility: CLINIC | Age: 67
End: 2022-10-05
Attending: PHYSICIAN ASSISTANT
Payer: COMMERCIAL

## 2022-10-05 ENCOUNTER — HOSPITAL ENCOUNTER (OUTPATIENT)
Facility: CLINIC | Age: 67
Discharge: HOME | End: 2022-10-05
Attending: INTERNAL MEDICINE | Admitting: INTERNAL MEDICINE
Payer: COMMERCIAL

## 2022-10-05 VITALS
OXYGEN SATURATION: 95 % | TEMPERATURE: 98 F | SYSTOLIC BLOOD PRESSURE: 133 MMHG | RESPIRATION RATE: 8 BRPM | DIASTOLIC BLOOD PRESSURE: 81 MMHG | HEART RATE: 73 BPM

## 2022-10-05 DIAGNOSIS — R91.8 PULMONARY NODULES: ICD-10-CM

## 2022-10-05 LAB
ERYTHROCYTE [DISTWIDTH] IN BLOOD BY AUTOMATED COUNT: 12.3 % (ref 10–15)
HCT VFR BLD AUTO: 36.4 % (ref 35–47)
HGB BLD-MCNC: 12.5 G/DL (ref 11.7–15.7)
INR PPP: 1.03 (ref 0.85–1.15)
MCH RBC QN AUTO: 31.4 PG (ref 26.5–33)
MCHC RBC AUTO-ENTMCNC: 34.3 G/DL (ref 31.5–36.5)
MCV RBC AUTO: 92 FL (ref 78–100)
PLATELET # BLD AUTO: 355 10E3/UL (ref 150–450)
RBC # BLD AUTO: 3.98 10E6/UL (ref 3.8–5.2)
WBC # BLD AUTO: 6.1 10E3/UL (ref 4–11)

## 2022-10-05 PROCEDURE — 85610 PROTHROMBIN TIME: CPT | Mod: GZ | Performed by: PHYSICIAN ASSISTANT

## 2022-10-05 PROCEDURE — 32408 CORE NDL BX LNG/MED PERQ: CPT | Mod: LT | Performed by: RADIOLOGY

## 2022-10-05 PROCEDURE — 32408 CORE NDL BX LNG/MED PERQ: CPT

## 2022-10-05 PROCEDURE — 999N000142 HC STATISTIC PROCEDURE PREP ONLY

## 2022-10-05 PROCEDURE — 36415 COLL VENOUS BLD VENIPUNCTURE: CPT | Performed by: PHYSICIAN ASSISTANT

## 2022-10-05 PROCEDURE — 999N000132 HC STATISTIC PP CARE STAGE 1

## 2022-10-05 PROCEDURE — 85027 COMPLETE CBC AUTOMATED: CPT | Performed by: PHYSICIAN ASSISTANT

## 2022-10-05 RX ORDER — NALOXONE HYDROCHLORIDE 0.4 MG/ML
0.4 INJECTION, SOLUTION INTRAMUSCULAR; INTRAVENOUS; SUBCUTANEOUS
Status: DISCONTINUED | OUTPATIENT
Start: 2022-10-05 | End: 2022-10-05 | Stop reason: HOSPADM

## 2022-10-05 RX ORDER — NALOXONE HYDROCHLORIDE 0.4 MG/ML
0.2 INJECTION, SOLUTION INTRAMUSCULAR; INTRAVENOUS; SUBCUTANEOUS
Status: DISCONTINUED | OUTPATIENT
Start: 2022-10-05 | End: 2022-10-05 | Stop reason: HOSPADM

## 2022-10-05 RX ORDER — FLUMAZENIL 0.1 MG/ML
0.2 INJECTION, SOLUTION INTRAVENOUS
Status: DISCONTINUED | OUTPATIENT
Start: 2022-10-05 | End: 2022-10-05 | Stop reason: HOSPADM

## 2022-10-05 RX ORDER — LIDOCAINE 40 MG/G
CREAM TOPICAL
Status: DISCONTINUED | OUTPATIENT
Start: 2022-10-05 | End: 2022-10-05 | Stop reason: HOSPADM

## 2022-10-05 RX ORDER — FENTANYL CITRATE 50 UG/ML
25-50 INJECTION, SOLUTION INTRAMUSCULAR; INTRAVENOUS EVERY 5 MIN PRN
Status: DISCONTINUED | OUTPATIENT
Start: 2022-10-05 | End: 2022-10-05 | Stop reason: HOSPADM

## 2022-10-05 ASSESSMENT — ACTIVITIES OF DAILY LIVING (ADL)
ADLS_ACUITY_SCORE: 35

## 2022-10-05 NOTE — PRE-PROCEDURE
GENERAL PRE-PROCEDURE:   Procedure:  IR Lung biopsy  Date/Time:  10/5/2022 8:41 AM    Verbal consent obtained?: Yes    Written consent obtained?: Yes    Risks and benefits: Risks, benefits and alternatives were discussed    Consent given by:  Patient  Patient states understanding of procedure being performed: Yes    Patient's understanding of procedure matches consent: Yes    Procedure consent matches procedure scheduled: Yes    Expected level of sedation:  Moderate  Appropriately NPO:  Yes  ASA Class:  2  Mallampati  :  Grade 2- soft palate, base of uvula, tonsillar pillars, and portion of posterior pharyngeal wall visible  Lungs:  Lungs clear with good breath sounds bilaterally  Heart:  Normal heart sounds and rate  History & Physical reviewed:  History and physical reviewed and no updates needed  Statement of review:  I have reviewed the lab findings, diagnostic data, medications, and the plan for sedation

## 2022-10-05 NOTE — PROGRESS NOTES
2A prep for Lung biopsy. Pt alert and oriented. Appropriately NPO. VSS. C/o Headache. Left PIV infusing.  at bedside.

## 2022-10-05 NOTE — PROGRESS NOTES
Patient Name: Sonny Rush  Medical Record Number: [id number]  Today's Date: 10/5/2022    Procedure: Image-guided left lung nodule biopsy   Proceduralist: Dr. Hawk and Dr. Davis    Other Notes: Pt arrived to IR room CT2 from . Consent reviewed.     Procedure not able to be completed and patient was transferred back to .    Yunior Prince RN

## 2022-10-05 NOTE — PROGRESS NOTES
Pt back from IR, procedure was cancelled per Alfie RN-IR nurse.  See MD's note for details.  PIV D/Virgilio, catheter intact.  Pt did not want anything to eat or drink.  Pt's  at the bedside.  Per Dr Davis, pt's primary MD will be in contact with her.  1000-Pt discharged to home with family.

## 2022-10-07 ENCOUNTER — PATIENT OUTREACH (OUTPATIENT)
Dept: SURGERY | Facility: CLINIC | Age: 67
End: 2022-10-07

## 2022-10-07 ENCOUNTER — MYC MEDICAL ADVICE (OUTPATIENT)
Dept: SURGERY | Facility: CLINIC | Age: 67
End: 2022-10-07

## 2022-10-07 DIAGNOSIS — R91.1 LESION OF LEFT LUNG: Primary | ICD-10-CM

## 2022-10-07 NOTE — PROGRESS NOTES
[facility]    Clinical Data: Care Coordinator Outreach  Attempted to call patient with update on recommendations from today's multidisciplinary conference.   Outreach attempted x 1. No answer. With it being late in the day writer sent MyChart message with conference recommendations. Provided contact information should patient have any questions.       Plan: Care Coordinator routing to MICHAEL Noguera RN-CC for follow-up.    Aurelia Shipley RN, BSN  Thoracic Surgery RN Care Coordinator

## 2022-10-24 ENCOUNTER — PATIENT OUTREACH (OUTPATIENT)
Dept: PULMONOLOGY | Facility: CLINIC | Age: 67
End: 2022-10-24

## 2022-10-24 NOTE — PROGRESS NOTES
"Called patient after receiving a message that she had further questions about the reason her procedure was cancelled, the size of the lung nodule now, and future follow up.    Informed her that we spoke on 10/12 after I had sent her a My Chart message with the details of why her procedure was cancelled.  We reviewed the contents of that My Chart message:     \"Liam Dennis,     I'm very sorry to hear about your experience.  The reason they decided against going through with the procedure was that the nodule had shrunken and they were concerned there might be a complication (pneumothorax or fistula) if they attempted to penetrate the smaller target.  As a cancer survivor I can imagine this is difficult news.  The lung nodule conference team agreed that the next best step would be a short term follow up CT scan.  The scheduling team will call you.     Thanks,     Shy, RNCC  Interventional Pulmonology\"    Patient doesn't recall reading it or discussing it previously.      Informed patient that since there is no report to go along with the CT on 10/05, this writer sent a message to Dr. Templeton to see if he could help clarify the size of the solid component of the lung nodule at present.  Advised her that when I hear more about this patient will be notified.      She gave permission for a message to be left on her voicemail.    "
none

## 2022-10-25 NOTE — CONFIDENTIAL NOTE
Talked to the patient and explained the fact the LLL nodular density looked smaller (solid component) therefore the biopsy was cancelled due to high risk of BP fistula and infection.  Will repeat CT chest end of Nov when I will see her.  She is in agreement with the plan.    Sunshine Templeton MD

## 2022-10-28 ENCOUNTER — TRANSFERRED RECORDS (OUTPATIENT)
Dept: HEALTH INFORMATION MANAGEMENT | Facility: CLINIC | Age: 67
End: 2022-10-28

## 2022-11-04 ENCOUNTER — HOSPITAL ENCOUNTER (OUTPATIENT)
Dept: BONE DENSITY | Facility: CLINIC | Age: 67
Discharge: HOME | End: 2022-11-04
Attending: INTERNAL MEDICINE | Admitting: INTERNAL MEDICINE
Payer: COMMERCIAL

## 2022-11-04 DIAGNOSIS — M81.0 OSTEOPOROSIS: ICD-10-CM

## 2022-11-04 PROCEDURE — 77080 DXA BONE DENSITY AXIAL: CPT

## 2022-11-07 ENCOUNTER — TELEPHONE (OUTPATIENT)
Dept: CARDIOLOGY | Facility: CLINIC | Age: 67
End: 2022-11-07

## 2022-11-07 NOTE — TELEPHONE ENCOUNTER
Pt called to report she has been having periodically having low blood pressures for the last 3 weeks. Pt reports it was happening a lot in the last two weeks.   Pt reports that she takes her BP in the morning prior to meds and it is usually 130s/70-80s.  Pt takes 25mg chlorthalidone and 100mg losartan in the AM around 7am.   Pt takes 360mg of verapamil at bedtime.     Pt will check her BP again in the early afternoon and it usually will be around 120s/70-80s.    Pt reports she will start feeling dizzy, ears will fill up, headache, and will have aches in shoulders, back, arms and this is when she notices her systolic BP going below 120 and has been as low as 80s/50s.  Pt reports when she feels these symptoms, her HR goes up.     11/6/22 - 87/59  at 11:15am    7:51am 131/84 HR 88      11/5/22 - 152/92 at 3:18pm   136/90 HR 87 in the morning at some point     11/4/22 - 111/88 HR 94 at 10:12am    11/3/22 - 113/68 HR 95 at 5:47pm    10/23/22 - 95/59 at 5:21pm    10/26/22 - 116/71  at 2:11pm     Pt's BP was 139/85 HR 76 while on the phone     Pt reports she was diagnosed with stage 3 kidney disease a few weeks ago and recently saw her PCP on 10/28/22.  When reviewing the PCP note, says pt is still having labile blood pressures and did run urine catecholamines that showed normal results.   Pt's creatinine was a little elevated at 1.24.  BP at that visit was 128/66 HR 81   Pt last saw nephrology on 10/13/22, notes are in care everywhere.     PCP thought her symptoms with the aches could be related to fibromyalgia.      Pt wondering if her BP meds need to be adjusted.   Informed pt this update will be sent to Shannon to review.

## 2022-11-08 NOTE — TELEPHONE ENCOUNTER
Spoke with pt about Shannon's recommendation to stop the chlorthalidone.  Pt gave verbal understanding and will stop the medication and check her BPs over the next 2 weeks.

## 2022-11-08 NOTE — TELEPHONE ENCOUNTER
Shannon Cain, Rosey Powers, RN  Caller: Unspecified (2 days ago, 10:39 AM)  Let's stop the chlorthalidone to see if that helps. Her blood pressure appears pretty well-controlled prior to her morning medications, but let's start there. If she continues to have low readings, then I would cut her losartan to 50 mg daily. Thank you!     -Shannon

## 2022-11-19 ENCOUNTER — HEALTH MAINTENANCE LETTER (OUTPATIENT)
Age: 67
End: 2022-11-19

## 2022-11-21 ENCOUNTER — TELEPHONE (OUTPATIENT)
Dept: CARDIOLOGY | Facility: CLINIC | Age: 67
End: 2022-11-21

## 2022-11-21 NOTE — TELEPHONE ENCOUNTER
Received VM from pt stating she believes her BP is too high since stopping chlorthalidone 25 mg daily, see telephone encounter 11/7/22. Called back to pt, no answer. Left VM requesting call back to [employer] with BP readings or alternatively can send in [employer] message with readings.

## 2022-11-22 ENCOUNTER — MYC MEDICAL ADVICE (OUTPATIENT)
Dept: CARDIOLOGY | Facility: CLINIC | Age: 67
End: 2022-11-22

## 2022-11-22 DIAGNOSIS — I10 ESSENTIAL HYPERTENSION: Primary | ICD-10-CM

## 2022-11-22 NOTE — TELEPHONE ENCOUNTER
Received [employer] message with home BP readings. Pt called on 11/21/22 to report she has noted elevated readings since discontinuing chlorthalidone 25 mg daily, see telephone encounter 11/7/22. Routing to CHRIS Ortega to review.

## 2022-11-23 NOTE — TELEPHONE ENCOUNTER
Received message from Shannon :  I think it would be reasonable to put her back on chlorthalidone, we can start at lower dose 12.5 mg daily. She has history of labile BP, so we may need to increase back to 25 mg daily in the future. Let's get repeat BMP in 1 week. Thank you!     -Shannon

## 2022-12-07 ENCOUNTER — HOSPITAL ENCOUNTER (OUTPATIENT)
Dept: CT IMAGING | Facility: CLINIC | Age: 67
Discharge: HOME | End: 2022-12-07
Attending: INTERNAL MEDICINE | Admitting: INTERNAL MEDICINE
Payer: COMMERCIAL

## 2022-12-07 DIAGNOSIS — R91.1 LESION OF LEFT LUNG: ICD-10-CM

## 2022-12-07 PROCEDURE — 74150 CT ABDOMEN W/O CONTRAST: CPT

## 2022-12-13 ENCOUNTER — LAB (OUTPATIENT)
Dept: LAB | Facility: CLINIC | Age: 67
End: 2022-12-13
Payer: COMMERCIAL

## 2022-12-13 DIAGNOSIS — I10 ESSENTIAL HYPERTENSION: ICD-10-CM

## 2022-12-13 LAB
ANION GAP SERPL CALCULATED.3IONS-SCNC: 8 MMOL/L (ref 3–14)
BUN SERPL-MCNC: 22 MG/DL (ref 7–30)
CALCIUM SERPL-MCNC: 9.4 MG/DL (ref 8.5–10.1)
CHLORIDE BLD-SCNC: 94 MMOL/L (ref 94–109)
CO2 SERPL-SCNC: 28 MMOL/L (ref 20–32)
CREAT SERPL-MCNC: 1.4 MG/DL (ref 0.52–1.04)
GFR SERPL CREATININE-BSD FRML MDRD: 41 ML/MIN/1.73M2
GLUCOSE BLD-MCNC: 110 MG/DL (ref 70–99)
POTASSIUM BLD-SCNC: 3.7 MMOL/L (ref 3.4–5.3)
SODIUM SERPL-SCNC: 130 MMOL/L (ref 133–144)

## 2022-12-13 PROCEDURE — 80048 BASIC METABOLIC PNL TOTAL CA: CPT | Performed by: NURSE PRACTITIONER

## 2022-12-13 PROCEDURE — 36415 COLL VENOUS BLD VENIPUNCTURE: CPT | Performed by: NURSE PRACTITIONER

## 2022-12-20 ENCOUNTER — VIRTUAL VISIT (OUTPATIENT)
Dept: PULMONOLOGY | Facility: CLINIC | Age: 67
End: 2022-12-20
Attending: INTERNAL MEDICINE
Payer: COMMERCIAL

## 2022-12-20 DIAGNOSIS — F41.9 ANXIETY: ICD-10-CM

## 2022-12-20 DIAGNOSIS — R91.8 MASS OF LOWER LOBE OF LUNG: ICD-10-CM

## 2022-12-20 DIAGNOSIS — J98.4 CAVITARY LESION OF LUNG: Primary | ICD-10-CM

## 2022-12-20 PROCEDURE — 99214 OFFICE O/P EST MOD 30 MIN: CPT | Mod: TEL | Performed by: INTERNAL MEDICINE

## 2022-12-20 NOTE — PROGRESS NOTES
Sonny is a 67 year old who is being evaluated via a billable telephone visit.  Currently in [facility].      What phone number would you like to be contacted at? [phone]  How would you like to obtain your AVS? Odalysharashlee    Distant Location (provider location):  Off-site  Phone call duration: 5 minutes  [facility]  Interventional Pulmonary Clinic Virtual Visit Note    December 20, 2022    Chief complaint:  Sonny Rush is a 67 year old female seen for No chief complaint on file.      Reason for clinic visit / Chief complaint:   Pulmonary nodule     Assessment and Plan:  Indeterminate pulmonary nodule, left lower lobe, cavitary, irregular borders, spiculated, non calcified. She had a PET/CT scan in 2012 when she was diagnosed of breast cancer however I could not locate that image.  Chest x-ray from 2007 did not reveal any abnormality to my review.  I have personally reviewed her CT scan of the chest from September, October and December 2022.  We had decided on CT guided biopsy however the lesion was somewhat smaller (solid part) therefore the biopsy was cancelled considering its potential risks and benefits.  I have also personally reviewed her PFTs revealing hyperinflation and air trapping otherwise normal spirometric indicis and diffusing capacity. We will perform PET scan which I ordered to be done at [facility] and will see her after the scan is done.     History of breast cancer treated with lumpectomy and radiation treatment (right breast).        History of Present Illness:  This is a 67 years old woman with no known pulmonary problems no history of exposure except for radiation treatment for right breast in 2012 recently fell and ended up having CT scan of the chest and abdomen/pelvis and found to have cavitary lesion and referred to our clinic for further evaluation.  She has no respiratory symptoms.  Bronchitis in the past but no pneumonia that she could remember.  Breast cancer  , lumpectomy, XRT     Smoking: quit 30 years ago after smoking socially for a year  Bronchitis  Paternal grandfather lung cancer  Exposure: none              Allergies   Allergen Reactions     Codeine Sulfate      Cough syrup     Depakote      Niacin      Prednisone         Past Medical History:   Diagnosis Date     Anxiety      Malignant neoplasm (H)      Meniere's disease      Migraines     ON NEURONTIN, sees neurologist     Mild depression     sees psych     PMB (postmenopausal bleeding)     Had  D & C  showing inactive endometrium        Past Surgical History:   Procedure Laterality Date     BIOPSY BREAST SEED LOCALIZATION  7/3/2012    Procedure: BIOPSY BREAST SEED LOCALIZATION;  RIGHT BREAST LUMPECTOMY WITH ULTRASOUND SEED LOCALIZATION;  Surgeon: Jaclyn Dalal MD;  Location:  [facility]     BREAST BIOPSY, RT/LT  00    stereotactic bx right breast--fibrocystic change     COLONOSCOPY N/A 2017    Procedure: COMBINED COLONOSCOPY, SINGLE OR MULTIPLE BIOPSY/POLYPECTOMY BY BIOPSY;  Surgeon: Muriel Wolff MD;  Location:  GI     DILATION AND CURETTAGE  06    inactive endometrium on path; done for PMB     ENT SURGERY      ear tubes, rhinoplasty     LAPAROSCOPY      Age 27 for pelvic pain     [facility]      [facility]        Social History     Socioeconomic History     Marital status:      Spouse name: Virgilio     Number of children: 0     Years of education: Not on file     Highest education level: Not on file   Occupational History     Occupation:      Employer: [employer]   Tobacco Use     Smoking status: Former     Types: Cigarettes     Quit date: 7/3/2003     Years since quittin.4     Smokeless tobacco: Never   Substance and Sexual Activity     Alcohol use: Yes     Alcohol/week: 0.0 standard drinks     Drug use: No     Sexual activity: Never     Partners: Male     Birth control/protection: Post-menopausal   Other Topics Concern     Parent/sibling w/  CABG, MI or angioplasty before 65F 55M? Not Asked   Social History Narrative     Not on file     Social Determinants of Health     Financial Resource Strain: Not on file   Food Insecurity: Not on file   Transportation Needs: Not on file   Physical Activity: Not on file   Stress: Not on file   Social Connections: Not on file   Intimate Partner Violence: Not on file   Housing Stability: Not on file        Family History   Problem Relation Age of Onset     Breast Cancer Unknown         aunt     Hyperlipidemia Father      Hypertension Father      Ovarian Cancer Unknown      Uterine Cancer Unknown         Immunization History   Administered Date(s) Administered     COVID-19 Vaccine 12+ (Pfizer 2022) 05/12/2022     COVID-19 Vaccine Bivalent Booster 12+ (Pfizer) 10/28/2022       Current Outpatient Medications   Medication Sig     buPROPion (WELLBUTRIN XL) 300 MG 24 hr tablet Take 300 mg by mouth daily     HydrOXYzine Pamoate (VISTARIL PO) Take 25 mg by mouth as needed     levothyroxine (SYNTHROID/LEVOTHROID) 88 MCG tablet Take 88 mcg by mouth daily     LORazepam (ATIVAN) 0.5 MG tablet Take 0.5 mg by mouth every 8 hours as needed     losartan (COZAAR) 100 MG tablet Take 100 mg by mouth daily     ondansetron (ZOFRAN-ODT) 4 MG ODT tab Take 4 mg by mouth every 8 hours as needed for nausea     rizatriptan (MAXALT-MLT) 10 MG ODT Take 1 tablet by mouth as needed     tiZANidine (ZANAFLEX) 2 MG tablet Take 1-3 tablets by mouth nightly as needed     valACYclovir (VALTREX) 500 MG tablet Take 500 mg by mouth as needed     verapamil ER (VERELAN) 180 MG 24 hr capsule Take 2 capsules (360 mg) by mouth At Bedtime     CINNAMON PO Take 1 tablet by mouth daily Mixed with b vitamin     No current facility-administered medications for this visit.        Review of Systems:  I have done 10 points of review systems and all negative except for those mentioned in HPI    Physical examination  Constitutional: Oriented, not in distress  Vitals:  unavailable  Eyes: No icterus, nystagmus, pupils isocoric  Head and neck: normal posture and movements  Respiratory: Normal tidal breathing, no shortness of breath, no audible wheezing or stridor over the phone or video visit  Musculoskeletal: Normal muscle mass, no deformity on hands/fingers  Integumentary:  No rash on visible skin areas on video visit  Neurological: Alert, orientedx3, no motor deficits  Psychiatric:  Mood and affect are appropriate with insight into his/her medical condition    Data:  Lab Results   Component Value Date    WBC 6.1 10/05/2022    WBC 5.8 02/07/2007     Lab Results   Component Value Date    RBC 3.98 10/05/2022    RBC 4.38 02/07/2007     Lab Results   Component Value Date    HGB 12.5 10/05/2022    HGB 13.8 02/07/2007     Lab Results   Component Value Date    HCT 36.4 10/05/2022    HCT 39.6 02/07/2007     Lab Results   Component Value Date    MCV 92 10/05/2022    MCV 91 02/07/2007     Lab Results   Component Value Date    MCH 31.4 10/05/2022    MCH 31.5 02/07/2007     Lab Results   Component Value Date    MCHC 34.3 10/05/2022    MCHC 34.7 02/07/2007     Lab Results   Component Value Date    RDW 12.3 10/05/2022    RDW 12.8 02/07/2007     Lab Results   Component Value Date     10/05/2022     02/07/2007       Lab Results   Component Value Date     12/13/2022     02/07/2007      Lab Results   Component Value Date    POTASSIUM 3.7 12/13/2022    POTASSIUM 3.2 02/07/2007     Lab Results   Component Value Date    CHLORIDE 94 12/13/2022    CHLORIDE 96 10/28/2022    CHLORIDE 106 02/07/2007     Lab Results   Component Value Date    DARRICK 9.4 12/13/2022    DARRICK 10.2 02/07/2007     Lab Results   Component Value Date    CO2 28 12/13/2022    CO2 28 02/07/2007     Lab Results   Component Value Date    BUN 22 12/13/2022    BUN 15 02/07/2007     Lab Results   Component Value Date    CR 1.40 12/13/2022    CR 1.13 02/07/2007     Lab Results   Component Value Date      12/13/2022    GLC 90 02/07/2007         NABIL Templeton MD

## 2022-12-20 NOTE — CONFIDENTIAL NOTE
The patient has requested medication for her anxiety and claustrophobia for PET scan testing.  Will prescribe 0.5 mg ativan to be taken 1-2 hours the testing and 2nd tablet at the time of PET scan if still feeling anxious.  No driving or activities requiring fine motor skills after ingestion of ativan for 12 hours.    Sunshine Templeton MD

## 2022-12-20 NOTE — LETTER
12/20/2022       RE: Sonny Rush  [address]  [place] [facility] [address]     Dear Colleague,    Thank you for referring your patient, Sonny Rush, to the [facility] at [facility]. Please see a copy of my visit note below.          [facility]  Interventional Pulmonary Clinic Virtual Visit Note    December 20, 2022    Chief complaint:  Sonny Rush is a 67 year old female seen for No chief complaint on file.      Reason for clinic visit / Chief complaint:   Pulmonary nodule     Assessment and Plan:  Indeterminate pulmonary nodule, left lower lobe, cavitary, irregular borders, spiculated, non calcified. She had a PET/CT scan in 2012 when she was diagnosed of breast cancer however I could not locate that image.  Chest x-ray from 2007 did not reveal any abnormality to my review.  I have personally reviewed her CT scan of the chest from September, October and December 2022.  We had decided on CT guided biopsy however the lesion was somewhat smaller (solid part) therefore the biopsy was cancelled considering its potential risks and benefits.  I have also personally reviewed her PFTs revealing hyperinflation and air trapping otherwise normal spirometric indicis and diffusing capacity. We will perform PET scan which I ordered to be done at [facility] and will see her after the scan is done.     History of breast cancer treated with lumpectomy and radiation treatment (right breast).        History of Present Illness:  This is a 67 years old woman with no known pulmonary problems no history of exposure except for radiation treatment for right breast in 2012 recently fell and ended up having CT scan of the chest and abdomen/pelvis and found to have cavitary lesion and referred to our clinic for further evaluation.  She has no respiratory symptoms.  Bronchitis in the past but no pneumonia that she could remember.  Breast cancer  , lumpectomy, XRT     Smoking: quit 30 years ago after smoking socially for a year  Bronchitis  Paternal grandfather lung cancer  Exposure: none              Allergies   Allergen Reactions     Codeine Sulfate      Cough syrup     Depakote      Niacin      Prednisone         Past Medical History:   Diagnosis Date     Anxiety      Malignant neoplasm (H)      Meniere's disease      Migraines     ON NEURONTIN, sees neurologist     Mild depression     sees psych     PMB (postmenopausal bleeding)     Had  D & C  showing inactive endometrium        Past Surgical History:   Procedure Laterality Date     BIOPSY BREAST SEED LOCALIZATION  7/3/2012    Procedure: BIOPSY BREAST SEED LOCALIZATION;  RIGHT BREAST LUMPECTOMY WITH ULTRASOUND SEED LOCALIZATION;  Surgeon: Jaclyn Dalal MD;  Location:  [facility]     BREAST BIOPSY, RT/LT  00    stereotactic bx right breast--fibrocystic change     COLONOSCOPY N/A 2017    Procedure: COMBINED COLONOSCOPY, SINGLE OR MULTIPLE BIOPSY/POLYPECTOMY BY BIOPSY;  Surgeon: Muriel Wolff MD;  Location:  GI     DILATION AND CURETTAGE  06    inactive endometrium on path; done for PMB     ENT SURGERY      ear tubes, rhinoplasty     LAPAROSCOPY      Age 27 for pelvic pain     [facility] SURGERY      [facility]        Social History     Socioeconomic History     Marital status:      Spouse name: Virgilio     Number of children: 0     Years of education: Not on file     Highest education level: Not on file   Occupational History     Occupation:      Employer: [employer]   Tobacco Use     Smoking status: Former     Types: Cigarettes     Quit date: 7/3/2003     Years since quittin.4     Smokeless tobacco: Never   Substance and Sexual Activity     Alcohol use: Yes     Alcohol/week: 0.0 standard drinks     Drug use: No     Sexual activity: Never     Partners: Male     Birth control/protection: Post-menopausal   Other Topics Concern     Parent/sibling w/  CABG, MI or angioplasty before 65F 55M? Not Asked   Social History Narrative     Not on file     Social Determinants of Health     Financial Resource Strain: Not on file   Food Insecurity: Not on file   Transportation Needs: Not on file   Physical Activity: Not on file   Stress: Not on file   Social Connections: Not on file   Intimate Partner Violence: Not on file   Housing Stability: Not on file        Family History   Problem Relation Age of Onset     Breast Cancer Unknown         aunt     Hyperlipidemia Father      Hypertension Father      Ovarian Cancer Unknown      Uterine Cancer Unknown         Immunization History   Administered Date(s) Administered     COVID-19 Vaccine 12+ (Pfizer 2022) 05/12/2022     COVID-19 Vaccine Bivalent Booster 12+ (Pfizer) 10/28/2022       Current Outpatient Medications   Medication Sig     buPROPion (WELLBUTRIN XL) 300 MG 24 hr tablet Take 300 mg by mouth daily     HydrOXYzine Pamoate (VISTARIL PO) Take 25 mg by mouth as needed     levothyroxine (SYNTHROID/LEVOTHROID) 88 MCG tablet Take 88 mcg by mouth daily     LORazepam (ATIVAN) 0.5 MG tablet Take 0.5 mg by mouth every 8 hours as needed     losartan (COZAAR) 100 MG tablet Take 100 mg by mouth daily     ondansetron (ZOFRAN-ODT) 4 MG ODT tab Take 4 mg by mouth every 8 hours as needed for nausea     rizatriptan (MAXALT-MLT) 10 MG ODT Take 1 tablet by mouth as needed     tiZANidine (ZANAFLEX) 2 MG tablet Take 1-3 tablets by mouth nightly as needed     valACYclovir (VALTREX) 500 MG tablet Take 500 mg by mouth as needed     verapamil ER (VERELAN) 180 MG 24 hr capsule Take 2 capsules (360 mg) by mouth At Bedtime     CINNAMON PO Take 1 tablet by mouth daily Mixed with b vitamin     No current facility-administered medications for this visit.        Review of Systems:  I have done 10 points of review systems and all negative except for those mentioned in HPI    Physical examination  Constitutional: Oriented, not in distress  Vitals:  unavailable  Eyes: No icterus, nystagmus, pupils isocoric  Head and neck: normal posture and movements  Respiratory: Normal tidal breathing, no shortness of breath, no audible wheezing or stridor over the phone or video visit  Musculoskeletal: Normal muscle mass, no deformity on hands/fingers  Integumentary:  No rash on visible skin areas on video visit  Neurological: Alert, orientedx3, no motor deficits  Psychiatric:  Mood and affect are appropriate with insight into his/her medical condition    Data:  Lab Results   Component Value Date    WBC 6.1 10/05/2022    WBC 5.8 02/07/2007     Lab Results   Component Value Date    RBC 3.98 10/05/2022    RBC 4.38 02/07/2007     Lab Results   Component Value Date    HGB 12.5 10/05/2022    HGB 13.8 02/07/2007     Lab Results   Component Value Date    HCT 36.4 10/05/2022    HCT 39.6 02/07/2007     Lab Results   Component Value Date    MCV 92 10/05/2022    MCV 91 02/07/2007     Lab Results   Component Value Date    MCH 31.4 10/05/2022    MCH 31.5 02/07/2007     Lab Results   Component Value Date    MCHC 34.3 10/05/2022    MCHC 34.7 02/07/2007     Lab Results   Component Value Date    RDW 12.3 10/05/2022    RDW 12.8 02/07/2007     Lab Results   Component Value Date     10/05/2022     02/07/2007       Lab Results   Component Value Date     12/13/2022     02/07/2007      Lab Results   Component Value Date    POTASSIUM 3.7 12/13/2022    POTASSIUM 3.2 02/07/2007     Lab Results   Component Value Date    CHLORIDE 94 12/13/2022    CHLORIDE 96 10/28/2022    CHLORIDE 106 02/07/2007     Lab Results   Component Value Date    DARRICK 9.4 12/13/2022    DARRICK 10.2 02/07/2007     Lab Results   Component Value Date    CO2 28 12/13/2022    CO2 28 02/07/2007     Lab Results   Component Value Date    BUN 22 12/13/2022    BUN 15 02/07/2007     Lab Results   Component Value Date    CR 1.40 12/13/2022    CR 1.13 02/07/2007     Lab Results   Component Value Date      12/13/2022    GLC 90 02/07/2007         NABIL Templeton MD

## 2022-12-21 ENCOUNTER — CARE COORDINATION (OUTPATIENT)
Dept: PULMONOLOGY | Facility: CLINIC | Age: 67
End: 2022-12-21

## 2023-01-09 ENCOUNTER — HOSPITAL ENCOUNTER (OUTPATIENT)
Dept: PET IMAGING | Facility: CLINIC | Age: 68
Discharge: HOME | End: 2023-01-09
Attending: INTERNAL MEDICINE | Admitting: INTERNAL MEDICINE
Payer: COMMERCIAL

## 2023-01-09 DIAGNOSIS — R91.8 MASS OF LOWER LOBE OF LUNG: ICD-10-CM

## 2023-01-09 DIAGNOSIS — J98.4 CAVITARY LESION OF LUNG: ICD-10-CM

## 2023-01-09 LAB
CREAT BLD-MCNC: 1.3 MG/DL (ref 0.5–1)
GFR SERPL CREATININE-BSD FRML MDRD: 44 ML/MIN/1.73M2

## 2023-01-09 PROCEDURE — 343N000001 HC RX 343: Performed by: INTERNAL MEDICINE

## 2023-01-09 PROCEDURE — 250N000011 HC RX IP 250 OP 636: Performed by: INTERNAL MEDICINE

## 2023-01-09 PROCEDURE — 74177 CT ABD & PELVIS W/CONTRAST: CPT

## 2023-01-09 PROCEDURE — A9552 F18 FDG: HCPCS | Performed by: INTERNAL MEDICINE

## 2023-01-09 PROCEDURE — 78815 PET IMAGE W/CT SKULL-THIGH: CPT | Mod: PI

## 2023-01-09 PROCEDURE — 82565 ASSAY OF CREATININE: CPT

## 2023-01-09 RX ORDER — IOPAMIDOL 755 MG/ML
10-135 INJECTION, SOLUTION INTRAVASCULAR ONCE
Status: COMPLETED | OUTPATIENT
Start: 2023-01-09 | End: 2023-01-09

## 2023-01-09 RX ADMIN — IOPAMIDOL 87 ML: 755 INJECTION, SOLUTION INTRAVENOUS at 13:42

## 2023-01-09 RX ADMIN — FLUDEOXYGLUCOSE F-18 13.78 MCI.: 500 INJECTION, SOLUTION INTRAVENOUS at 13:32

## 2023-01-10 DIAGNOSIS — R91.8 PULMONARY NODULES: Primary | ICD-10-CM

## 2023-01-10 NOTE — PROGRESS NOTES
IP Note    Reviewed CT and PET from yesterday.  Called and left message to her VM.    Will resume surveillance w CT chest in 4 months.    [facility]    Sunshine Templeton MD

## 2023-01-31 ENCOUNTER — VIRTUAL VISIT (OUTPATIENT)
Dept: PULMONOLOGY | Facility: CLINIC | Age: 68
End: 2023-01-31
Attending: INTERNAL MEDICINE
Payer: COMMERCIAL

## 2023-01-31 DIAGNOSIS — J98.4 CAVITARY LESION OF LUNG: Primary | ICD-10-CM

## 2023-01-31 PROCEDURE — G0463 HOSPITAL OUTPT CLINIC VISIT: HCPCS | Mod: PN,TEL | Performed by: INTERNAL MEDICINE

## 2023-01-31 PROCEDURE — 99214 OFFICE O/P EST MOD 30 MIN: CPT | Mod: TEL | Performed by: INTERNAL MEDICINE

## 2023-01-31 RX ORDER — ALENDRONATE SODIUM 70 MG/1
70 TABLET ORAL
COMMUNITY
Start: 2022-12-12 | End: 2023-02-09

## 2023-01-31 NOTE — PROGRESS NOTES
Sonny is a 67 year old who is being evaluated via a billable telephone visit.      What phone number would you like to be contacted at? [phone]  How would you like to obtain your AVS? Jaison Medrano     Distant Location (provider location):  On-site  Phone call duration: 7 minutes      [facility]  Interventional Pulmonary Clinic Virtual Visit Note    January 31, 2023    Chief complaint:  Sonny Rush is a 67 year old female seen for   Chief Complaint   Patient presents with     RECHECK     Phone visit        Chief complaint:  Sonny Rush is a 67 year old female seen for cavitary lung lesion        Reason for clinic visit / Chief complaint:   Pulmonary nodule     Assessment and Plan:  Indeterminate pulmonary nodule, left lower lobe, cavitary, irregular borders, spiculated, non calcified. She had a PET/CT scan in 2012 when she was diagnosed of breast cancer however I could not locate that image.  Chest x-ray from 2007 did not reveal any abnormality to my review.  I have personally reviewed her CT scan of the chest from September, October and December 2022 and Jan 2023 (CT/PET).  We had decided on CT guided biopsy however the lesion was somewhat smaller (solid part) therefore the biopsy was cancelled considering its potential risks and benefits in Oct 2022.  I have also personally reviewed her PFTs revealing hyperinflation and air trapping otherwise normal spirometric indicis and diffusing capacity.   We had a discussion today again how to approach or follow the cavitary lesion in the left lower lobe.  CT-guided biopsy in robotic bronchoscopic biopsies are possibilities but not 100% yield and potential complications such as pneumothorax, bleeding or seeding infection in the pleural space if CT-guided biopsy done.  More definitive approach will be surgical resection.  Patient is in agreement to talk to a thoracic surgeon to hear about technical aspect of the procedure.  I will place  the referral for thoracic surgery.     History of breast cancer treated with lumpectomy and radiation treatment (right breast).        History of Present Illness:  This is a 67 years old woman with no known pulmonary problems no history of exposure except for radiation treatment for right breast in 2012 recently fell and ended up having CT scan of the chest and abdomen/pelvis and found to have cavitary lesion and referred to our clinic for further evaluation.  She has no respiratory symptoms.  Bronchitis in the past but no pneumonia that she could remember.  Breast cancer 2012, lumpectomy, XRT     Smoking: quit 30 years ago after smoking socially for a year  Bronchitis  Paternal grandfather lung cancer  Exposure: none               Allergies   Allergen Reactions     Codeine Sulfate      Cough syrup     Depakote      Niacin      Prednisone         Past Medical History:   Diagnosis Date     Anxiety      Malignant neoplasm (H)      Meniere's disease      Migraines     ON NEURONTIN, sees neurologist     Mild depression     sees psych     PMB (postmenopausal bleeding) 2006    Had  D & C 2/06 showing inactive endometrium        Past Surgical History:   Procedure Laterality Date     BIOPSY BREAST SEED LOCALIZATION  7/3/2012    Procedure: BIOPSY BREAST SEED LOCALIZATION;  RIGHT BREAST LUMPECTOMY WITH ULTRASOUND SEED LOCALIZATION;  Surgeon: Jaclyn Dalal MD;  Location:  [facility]     BREAST BIOPSY, RT/LT  11/29/00    stereotactic bx right breast--fibrocystic change     COLONOSCOPY N/A 1/30/2017    Procedure: COMBINED COLONOSCOPY, SINGLE OR MULTIPLE BIOPSY/POLYPECTOMY BY BIOPSY;  Surgeon: Muriel Wolff MD;  Location:  [facility]     DILATION AND CURETTAGE  2/27/06    inactive endometrium on path; done for PMB     ENT SURGERY      ear tubes, rhinoplasty     LAPAROSCOPY      Age 27 for pelvic pain     ORTHOPEDIC SURGERY      [facility]        Social History     Socioeconomic History     Marital status:      Spouse  name: Virgilio     Number of children: 0     Years of education: Not on file     Highest education level: Not on file   Occupational History     Occupation:      Employer: [employer]   Tobacco Use     Smoking status: Former     Types: Cigarettes     Quit date: 7/3/2003     Years since quittin.5     Smokeless tobacco: Never   Substance and Sexual Activity     Alcohol use: Yes     Alcohol/week: 0.0 standard drinks     Drug use: No     Sexual activity: Never     Partners: Male     Birth control/protection: Post-menopausal   Other Topics Concern     Parent/sibling w/ CABG, MI or angioplasty before 65F 55M? Not Asked   Social History Narrative     Not on file     Social Determinants of Health     Financial Resource Strain: Not on file   Food Insecurity: Not on file   Transportation Needs: Not on file   Physical Activity: Not on file   Stress: Not on file   Social Connections: Not on file   Intimate Partner Violence: Not on file   Housing Stability: Not on file        Family History   Problem Relation Age of Onset     Breast Cancer Unknown         aunt     Hyperlipidemia Father      Hypertension Father      Ovarian Cancer Unknown      Uterine Cancer Unknown         Immunization History   Administered Date(s) Administered     COVID-19 Vaccine 12+ (Pfizer ) 2022     COVID-19 Vaccine Bivalent Booster 12+ (Pfizer) 10/28/2022       Current Outpatient Medications   Medication Sig     alendronate (FOSAMAX) 70 MG tablet Take 70 mg by mouth     buPROPion (WELLBUTRIN XL) 300 MG 24 hr tablet Take 300 mg by mouth daily     HydrOXYzine Pamoate (VISTARIL PO) Take 25 mg by mouth as needed     levothyroxine (SYNTHROID/LEVOTHROID) 88 MCG tablet Take 88 mcg by mouth daily     LORazepam (ATIVAN) 0.5 MG tablet Take 0.5 mg by mouth every 8 hours as needed     losartan (COZAAR) 100 MG tablet Take 100 mg by mouth daily     ondansetron (ZOFRAN-ODT) 4 MG ODT tab Take 4 mg by mouth every 8 hours as needed for nausea      rizatriptan (MAXALT-MLT) 10 MG ODT Take 1 tablet by mouth as needed     tiZANidine (ZANAFLEX) 2 MG tablet Take 1-3 tablets by mouth nightly as needed     valACYclovir (VALTREX) 500 MG tablet Take 500 mg by mouth as needed     verapamil ER (VERELAN) 180 MG 24 hr capsule Take 2 capsules (360 mg) by mouth At Bedtime     CINNAMON PO Take 1 tablet by mouth daily Mixed with b vitamin     No current facility-administered medications for this visit.        Review of Systems:  I have done 10 points of review systems and all negative except for those mentioned in HPI    Physical examination  Constitutional: Oriented, not in distress  Respiratory: Normal tidal breathing, no shortness of breath, no audible wheezing or stridor over the phone  Psychiatric:  Mood and affect are appropriate with insight into his/her medical condition    Data:  Lab Results   Component Value Date    WBC 6.1 10/05/2022    WBC 5.8 02/07/2007     Lab Results   Component Value Date    RBC 3.98 10/05/2022    RBC 4.38 02/07/2007     Lab Results   Component Value Date    HGB 12.5 10/05/2022    HGB 13.8 02/07/2007     Lab Results   Component Value Date    HCT 36.4 10/05/2022    HCT 39.6 02/07/2007     Lab Results   Component Value Date    MCV 92 10/05/2022    MCV 91 02/07/2007     Lab Results   Component Value Date    MCH 31.4 10/05/2022    MCH 31.5 02/07/2007     Lab Results   Component Value Date    MCHC 34.3 10/05/2022    MCHC 34.7 02/07/2007     Lab Results   Component Value Date    RDW 12.3 10/05/2022    RDW 12.8 02/07/2007     Lab Results   Component Value Date     10/05/2022     02/07/2007       Lab Results   Component Value Date     12/13/2022     02/07/2007      Lab Results   Component Value Date    POTASSIUM 3.7 12/13/2022    POTASSIUM 3.2 02/07/2007     Lab Results   Component Value Date    CHLORIDE 94 12/13/2022    CHLORIDE 96 10/28/2022    CHLORIDE 106 02/07/2007     Lab Results   Component Value Date    DARRICK 9.4  12/13/2022    DARRICK 10.2 02/07/2007     Lab Results   Component Value Date    CO2 28 12/13/2022    CO2 28 02/07/2007     Lab Results   Component Value Date    BUN 22 12/13/2022    BUN 15 02/07/2007     Lab Results   Component Value Date    CR 1.3 01/09/2023    CR 1.40 12/13/2022    CR 1.13 02/07/2007     Lab Results   Component Value Date     12/13/2022    GLC 90 02/07/2007         NABIL Templeton MD

## 2023-01-31 NOTE — LETTER
1/31/2023      RE: Sonny Rush  [address]  [facility] [address]         [facility]  Interventional Pulmonary Clinic Virtual Visit Note    January 31, 2023    Chief complaint:  Sonny Rush is a 67 year old female seen for   Chief Complaint   Patient presents with     RECHECK     Phone visit        Chief complaint:  Sonny Rush is a 67 year old female seen for cavitary lung lesion        Reason for clinic visit / Chief complaint:   Pulmonary nodule     Assessment and Plan:  Indeterminate pulmonary nodule, left lower lobe, cavitary, irregular borders, spiculated, non calcified. She had a PET/CT scan in 2012 when she was diagnosed of breast cancer however I could not locate that image.  Chest x-ray from 2007 did not reveal any abnormality to my review.  I have personally reviewed her CT scan of the chest from September, October and December 2022 and Jan 2023 (CT/PET).  We had decided on CT guided biopsy however the lesion was somewhat smaller (solid part) therefore the biopsy was cancelled considering its potential risks and benefits in Oct 2022.  I have also personally reviewed her PFTs revealing hyperinflation and air trapping otherwise normal spirometric indicis and diffusing capacity.   We had a discussion today again how to approach or follow the cavitary lesion in the left lower lobe.  CT-guided biopsy in robotic bronchoscopic biopsies are possibilities but not 100% yield and potential complications such as pneumothorax, bleeding or seeding infection in the pleural space if CT-guided biopsy done.  More definitive approach will be surgical resection.  Patient is in agreement to talk to a thoracic surgeon to hear about technical aspect of the procedure.  I will place the referral for thoracic surgery.     History of breast cancer treated with lumpectomy and radiation treatment (right breast).        History of Present Illness:  This is a 67 years old woman with no known  pulmonary problems no history of exposure except for radiation treatment for right breast in 2012 recently fell and ended up having CT scan of the chest and abdomen/pelvis and found to have cavitary lesion and referred to our clinic for further evaluation.  She has no respiratory symptoms.  Bronchitis in the past but no pneumonia that she could remember.  Breast cancer 2012, lumpectomy, XRT     Smoking: quit 30 years ago after smoking socially for a year  Bronchitis  Paternal grandfather lung cancer  Exposure: none               Allergies   Allergen Reactions     Codeine Sulfate      Cough syrup     Depakote      Niacin      Prednisone         Past Medical History:   Diagnosis Date     Anxiety      Malignant neoplasm (H)      Meniere's disease      Migraines     ON NEURONTIN, sees neurologist     Mild depression     sees psych     PMB (postmenopausal bleeding) 2006    Had  D & C 2/06 showing inactive endometrium        Past Surgical History:   Procedure Laterality Date     BIOPSY BREAST SEED LOCALIZATION  7/3/2012    Procedure: BIOPSY BREAST SEED LOCALIZATION;  RIGHT BREAST LUMPECTOMY WITH ULTRASOUND SEED LOCALIZATION;  Surgeon: Jaclyn Dalal MD;  Location:  [facility]     BREAST BIOPSY, RT/LT  11/29/00    stereotactic bx right breast--fibrocystic change     COLONOSCOPY N/A 1/30/2017    Procedure: COMBINED COLONOSCOPY, SINGLE OR MULTIPLE BIOPSY/POLYPECTOMY BY BIOPSY;  Surgeon: Muriel Wolff MD;  Location:  GI     DILATION AND CURETTAGE  2/27/06    inactive endometrium on path; done for PMB     ENT SURGERY      ear tubes, rhinoplasty     LAPAROSCOPY      Age 27 for pelvic pain     ORTHOPEDIC SURGERY      [facility]        Social History     Socioeconomic History     Marital status:      Spouse name: Virgilio     Number of children: 0     Years of education: Not on file     Highest education level: Not on file   Occupational History     Occupation:      Employer: [employer]   Tobacco Use      Smoking status: Former     Types: Cigarettes     Quit date: 7/3/2003     Years since quittin.5     Smokeless tobacco: Never   Substance and Sexual Activity     Alcohol use: Yes     Alcohol/week: 0.0 standard drinks     Drug use: No     Sexual activity: Never     Partners: Male     Birth control/protection: Post-menopausal   Other Topics Concern     Parent/sibling w/ CABG, MI or angioplasty before 65F 55M? Not Asked   Social History Narrative     Not on file     Social Determinants of Health     Financial Resource Strain: Not on file   Food Insecurity: Not on file   Transportation Needs: Not on file   Physical Activity: Not on file   Stress: Not on file   Social Connections: Not on file   Intimate Partner Violence: Not on file   Housing Stability: Not on file        Family History   Problem Relation Age of Onset     Breast Cancer Unknown         aunt     Hyperlipidemia Father      Hypertension Father      Ovarian Cancer Unknown      Uterine Cancer Unknown         Immunization History   Administered Date(s) Administered     COVID-19 Vaccine 12+ (Pfizer ) 2022     COVID-19 Vaccine Bivalent Booster 12+ (Pfizer) 10/28/2022       Current Outpatient Medications   Medication Sig     alendronate (FOSAMAX) 70 MG tablet Take 70 mg by mouth     buPROPion (WELLBUTRIN XL) 300 MG 24 hr tablet Take 300 mg by mouth daily     HydrOXYzine Pamoate (VISTARIL PO) Take 25 mg by mouth as needed     levothyroxine (SYNTHROID/LEVOTHROID) 88 MCG tablet Take 88 mcg by mouth daily     LORazepam (ATIVAN) 0.5 MG tablet Take 0.5 mg by mouth every 8 hours as needed     losartan (COZAAR) 100 MG tablet Take 100 mg by mouth daily     ondansetron (ZOFRAN-ODT) 4 MG ODT tab Take 4 mg by mouth every 8 hours as needed for nausea     rizatriptan (MAXALT-MLT) 10 MG ODT Take 1 tablet by mouth as needed     tiZANidine (ZANAFLEX) 2 MG tablet Take 1-3 tablets by mouth nightly as needed     valACYclovir (VALTREX) 500 MG tablet Take 500 mg by mouth  as needed     verapamil ER (VERELAN) 180 MG 24 hr capsule Take 2 capsules (360 mg) by mouth At Bedtime     CINNAMON PO Take 1 tablet by mouth daily Mixed with b vitamin     No current facility-administered medications for this visit.        Review of Systems:  I have done 10 points of review systems and all negative except for those mentioned in HPI    Physical examination  Constitutional: Oriented, not in distress  Respiratory: Normal tidal breathing, no shortness of breath, no audible wheezing or stridor over the phone  Psychiatric:  Mood and affect are appropriate with insight into his/her medical condition    Data:  Lab Results   Component Value Date    WBC 6.1 10/05/2022    WBC 5.8 02/07/2007     Lab Results   Component Value Date    RBC 3.98 10/05/2022    RBC 4.38 02/07/2007     Lab Results   Component Value Date    HGB 12.5 10/05/2022    HGB 13.8 02/07/2007     Lab Results   Component Value Date    HCT 36.4 10/05/2022    HCT 39.6 02/07/2007     Lab Results   Component Value Date    MCV 92 10/05/2022    MCV 91 02/07/2007     Lab Results   Component Value Date    MCH 31.4 10/05/2022    MCH 31.5 02/07/2007     Lab Results   Component Value Date    MCHC 34.3 10/05/2022    MCHC 34.7 02/07/2007     Lab Results   Component Value Date    RDW 12.3 10/05/2022    RDW 12.8 02/07/2007     Lab Results   Component Value Date     10/05/2022     02/07/2007       Lab Results   Component Value Date     12/13/2022     02/07/2007      Lab Results   Component Value Date    POTASSIUM 3.7 12/13/2022    POTASSIUM 3.2 02/07/2007     Lab Results   Component Value Date    CHLORIDE 94 12/13/2022    CHLORIDE 96 10/28/2022    CHLORIDE 106 02/07/2007     Lab Results   Component Value Date    DARRICK 9.4 12/13/2022    DARRICK 10.2 02/07/2007     Lab Results   Component Value Date    CO2 28 12/13/2022    CO2 28 02/07/2007     Lab Results   Component Value Date    BUN 22 12/13/2022    BUN 15 02/07/2007     Lab Results    Component Value Date    CR 1.3 01/09/2023    CR 1.40 12/13/2022    CR 1.13 02/07/2007     Lab Results   Component Value Date     12/13/2022    GLC 90 02/07/2007         NABIL Templeton MD

## 2023-01-31 NOTE — LETTER
1/31/2023       RE: Sonny Rush  [address]  [facility] [address]     Dear Colleague,    Thank you for referring your patient, Sonny Rush, [facility] at [facility]. Please see a copy of my visit note below.    Sonny is a 67 year old who is being evaluated via a billable telephone visit.      What phone number would you like to be contacted at? [phone]  How would you like to obtain your AVS? Jaison Medrano     Distant Location (provider location):  On-site  Phone call duration: 7 minutes      [facility]  Interventional Pulmonary Clinic Virtual Visit Note    January 31, 2023    Chief complaint:  Sonny Rush is a 67 year old female seen for   Chief Complaint   Patient presents with     RECHECK     Phone visit        Chief complaint:  Sonny Rush is a 67 year old female seen for cavitary lung lesion        Reason for clinic visit / Chief complaint:   Pulmonary nodule     Assessment and Plan:  Indeterminate pulmonary nodule, left lower lobe, cavitary, irregular borders, spiculated, non calcified. She had a PET/CT scan in 2012 when she was diagnosed of breast cancer however I could not locate that image.  Chest x-ray from 2007 did not reveal any abnormality to my review.  I have personally reviewed her CT scan of the chest from September, October and December 2022 and Jan 2023 (CT/PET).  We had decided on CT guided biopsy however the lesion was somewhat smaller (solid part) therefore the biopsy was cancelled considering its potential risks and benefits in Oct 2022.  I have also personally reviewed her PFTs revealing hyperinflation and air trapping otherwise normal spirometric indicis and diffusing capacity.   We had a discussion today again how to approach or follow the cavitary lesion in the left lower lobe.  CT-guided biopsy in robotic bronchoscopic biopsies are  possibilities but not 100% yield and potential complications such as pneumothorax, bleeding or seeding infection in the pleural space if CT-guided biopsy done.  More definitive approach will be surgical resection.  Patient is in agreement to talk to a thoracic surgeon to hear about technical aspect of the procedure.  I will place the referral for thoracic surgery.     History of breast cancer treated with lumpectomy and radiation treatment (right breast).        History of Present Illness:  This is a 67 years old woman with no known pulmonary problems no history of exposure except for radiation treatment for right breast in 2012 recently fell and ended up having CT scan of the chest and abdomen/pelvis and found to have cavitary lesion and referred to our clinic for further evaluation.  She has no respiratory symptoms.  Bronchitis in the past but no pneumonia that she could remember.  Breast cancer 2012, lumpectomy, XRT     Smoking: quit 30 years ago after smoking socially for a year  Bronchitis  Paternal grandfather lung cancer  Exposure: none               Allergies   Allergen Reactions     Codeine Sulfate      Cough syrup     Depakote      Niacin      Prednisone         Past Medical History:   Diagnosis Date     Anxiety      Malignant neoplasm (H)      Meniere's disease      Migraines     ON NEURONTIN, sees neurologist     Mild depression     sees psych     PMB (postmenopausal bleeding) 2006    Had  D & C 2/06 showing inactive endometrium        Past Surgical History:   Procedure Laterality Date     BIOPSY BREAST SEED LOCALIZATION  7/3/2012    Procedure: BIOPSY BREAST SEED LOCALIZATION;  RIGHT BREAST LUMPECTOMY WITH ULTRASOUND SEED LOCALIZATION;  Surgeon: Jaclyn Dalal MD;  Location:  [facility]     BREAST BIOPSY, RT/LT  11/29/00    stereotactic bx right breast--fibrocystic change     COLONOSCOPY N/A 1/30/2017    Procedure: COMBINED COLONOSCOPY, SINGLE OR MULTIPLE BIOPSY/POLYPECTOMY BY BIOPSY;  Surgeon: New  Muriel Richards MD;  Location:  GI     DILATION AND CURETTAGE  06    inactive endometrium on path; done for PMB     ENT SURGERY      ear tubes, rhinoplasty     LAPAROSCOPY      Age 27 for pelvic pain     ORTHOPEDIC SURGERY      yaya        Social History     Socioeconomic History     Marital status:      Spouse name: Virgilio     Number of children: 0     Years of education: Not on file     Highest education level: Not on file   Occupational History     Occupation:      Employer: [employer]   Tobacco Use     Smoking status: Former     Types: Cigarettes     Quit date: 7/3/2003     Years since quittin.5     Smokeless tobacco: Never   Substance and Sexual Activity     Alcohol use: Yes     Alcohol/week: 0.0 standard drinks     Drug use: No     Sexual activity: Never     Partners: Male     Birth control/protection: Post-menopausal   Other Topics Concern     Parent/sibling w/ CABG, MI or angioplasty before 65F 55M? Not Asked   Social History Narrative     Not on file     Social Determinants of Health     Financial Resource Strain: Not on file   Food Insecurity: Not on file   Transportation Needs: Not on file   Physical Activity: Not on file   Stress: Not on file   Social Connections: Not on file   Intimate Partner Violence: Not on file   Housing Stability: Not on file        Family History   Problem Relation Age of Onset     Breast Cancer Unknown         aunt     Hyperlipidemia Father      Hypertension Father      Ovarian Cancer Unknown      Uterine Cancer Unknown         Immunization History   Administered Date(s) Administered     COVID-19 Vaccine 12+ (Pfizer ) 2022     COVID-19 Vaccine Bivalent Booster 12+ (Pfizer) 10/28/2022       Current Outpatient Medications   Medication Sig     alendronate (FOSAMAX) 70 MG tablet Take 70 mg by mouth     buPROPion (WELLBUTRIN XL) 300 MG 24 hr tablet Take 300 mg by mouth daily     HydrOXYzine Pamoate (VISTARIL PO) Take 25 mg by mouth as needed      levothyroxine (SYNTHROID/LEVOTHROID) 88 MCG tablet Take 88 mcg by mouth daily     LORazepam (ATIVAN) 0.5 MG tablet Take 0.5 mg by mouth every 8 hours as needed     losartan (COZAAR) 100 MG tablet Take 100 mg by mouth daily     ondansetron (ZOFRAN-ODT) 4 MG ODT tab Take 4 mg by mouth every 8 hours as needed for nausea     rizatriptan (MAXALT-MLT) 10 MG ODT Take 1 tablet by mouth as needed     tiZANidine (ZANAFLEX) 2 MG tablet Take 1-3 tablets by mouth nightly as needed     valACYclovir (VALTREX) 500 MG tablet Take 500 mg by mouth as needed     verapamil ER (VERELAN) 180 MG 24 hr capsule Take 2 capsules (360 mg) by mouth At Bedtime     CINNAMON PO Take 1 tablet by mouth daily Mixed with b vitamin     No current facility-administered medications for this visit.        Review of Systems:  I have done 10 points of review systems and all negative except for those mentioned in HPI    Physical examination  Constitutional: Oriented, not in distress  Respiratory: Normal tidal breathing, no shortness of breath, no audible wheezing or stridor over the phone  Psychiatric:  Mood and affect are appropriate with insight into his/her medical condition    Data:  Lab Results   Component Value Date    WBC 6.1 10/05/2022    WBC 5.8 02/07/2007     Lab Results   Component Value Date    RBC 3.98 10/05/2022    RBC 4.38 02/07/2007     Lab Results   Component Value Date    HGB 12.5 10/05/2022    HGB 13.8 02/07/2007     Lab Results   Component Value Date    HCT 36.4 10/05/2022    HCT 39.6 02/07/2007     Lab Results   Component Value Date    MCV 92 10/05/2022    MCV 91 02/07/2007     Lab Results   Component Value Date    MCH 31.4 10/05/2022    MCH 31.5 02/07/2007     Lab Results   Component Value Date    MCHC 34.3 10/05/2022    MCHC 34.7 02/07/2007     Lab Results   Component Value Date    RDW 12.3 10/05/2022    RDW 12.8 02/07/2007     Lab Results   Component Value Date     10/05/2022     02/07/2007       Lab Results    Component Value Date     12/13/2022     02/07/2007      Lab Results   Component Value Date    POTASSIUM 3.7 12/13/2022    POTASSIUM 3.2 02/07/2007     Lab Results   Component Value Date    CHLORIDE 94 12/13/2022    CHLORIDE 96 10/28/2022    CHLORIDE 106 02/07/2007     Lab Results   Component Value Date    DARRICK 9.4 12/13/2022    DARRICK 10.2 02/07/2007     Lab Results   Component Value Date    CO2 28 12/13/2022    CO2 28 02/07/2007     Lab Results   Component Value Date    BUN 22 12/13/2022    BUN 15 02/07/2007     Lab Results   Component Value Date    CR 1.3 01/09/2023    CR 1.40 12/13/2022    CR 1.13 02/07/2007     Lab Results   Component Value Date     12/13/2022    GLC 90 02/07/2007         NABIL Templeton MD      Again, thank you for allowing me to participate in the care of your patient.      Sincerely,    Hany Templeton MD

## 2023-02-02 NOTE — TELEPHONE ENCOUNTER
RECORDS STATUS - ALL OTHER DIAGNOSIS      RECORDS RECEIVED FROM: EPIC   DATE RECEIVED: 02/02/23   NOTES STATUS DETAILS   OFFICE NOTE from referring provider Epic 01/31/23: Dr. Hany Templeton   DISCHARGE SUMMARY from hospital [facility] 10/05/22: [facility]   DISCHARGE REPORT from the ER [facility] 09/11/22: [facility] ED   MEDICATION LIST [facility]    LABS     PATHOLOGY REPORTS     ANYTHING RELATED TO DIAGNOSIS Epic Most recent 01/09/23   IMAGING (NEED IMAGES & REPORT)     CT SCANS PACS 01/09/23-09/11/22: CT Chest Abd Pel  10/05/22: CT Lung   PET PACS 01/09/23: PET Onc

## 2023-02-09 ENCOUNTER — PREP FOR PROCEDURE (OUTPATIENT)
Dept: SURGERY | Facility: CLINIC | Age: 68
End: 2023-02-09

## 2023-02-09 ENCOUNTER — ONCOLOGY VISIT (OUTPATIENT)
Dept: SURGERY | Facility: CLINIC | Age: 68
End: 2023-02-09
Attending: INTERNAL MEDICINE
Payer: COMMERCIAL

## 2023-02-09 ENCOUNTER — PRE VISIT (OUTPATIENT)
Dept: SURGERY | Facility: CLINIC | Age: 68
End: 2023-02-09
Payer: COMMERCIAL

## 2023-02-09 VITALS
HEART RATE: 81 BPM | SYSTOLIC BLOOD PRESSURE: 120 MMHG | DIASTOLIC BLOOD PRESSURE: 81 MMHG | OXYGEN SATURATION: 100 % | TEMPERATURE: 97.6 F | RESPIRATION RATE: 16 BRPM

## 2023-02-09 DIAGNOSIS — R91.1 LUNG NODULE: Primary | ICD-10-CM

## 2023-02-09 DIAGNOSIS — J98.4 CAVITARY LESION OF LUNG: ICD-10-CM

## 2023-02-09 PROCEDURE — G0463 HOSPITAL OUTPT CLINIC VISIT: HCPCS | Performed by: STUDENT IN AN ORGANIZED HEALTH CARE EDUCATION/TRAINING PROGRAM

## 2023-02-09 PROCEDURE — G0463 HOSPITAL OUTPT CLINIC VISIT: HCPCS

## 2023-02-09 PROCEDURE — 99204 OFFICE O/P NEW MOD 45 MIN: CPT | Performed by: STUDENT IN AN ORGANIZED HEALTH CARE EDUCATION/TRAINING PROGRAM

## 2023-02-09 RX ORDER — MIRTAZAPINE 7.5 MG/1
7.5 TABLET, FILM COATED ORAL
COMMUNITY
Start: 2022-12-02

## 2023-02-09 RX ORDER — CLONIDINE 0.1 MG/24H
1 PATCH, EXTENDED RELEASE TRANSDERMAL WEEKLY
COMMUNITY
Start: 2023-02-08

## 2023-02-09 RX ORDER — BUSPIRONE HYDROCHLORIDE 15 MG/1
15 TABLET ORAL 2 TIMES DAILY
COMMUNITY
Start: 2023-02-02

## 2023-02-09 RX ORDER — CHLORTHALIDONE 25 MG/1
25 TABLET ORAL DAILY
COMMUNITY
Start: 2023-01-31

## 2023-02-09 RX ORDER — ENOXAPARIN SODIUM 100 MG/ML
40 INJECTION SUBCUTANEOUS
Status: CANCELLED | OUTPATIENT
Start: 2023-02-09

## 2023-02-09 RX ORDER — FOLIC ACID 1 MG/1
1 TABLET ORAL EVERY OTHER DAY
COMMUNITY
End: 2023-03-31

## 2023-02-09 ASSESSMENT — PAIN SCALES - GENERAL: PAINLEVEL: MODERATE PAIN (5)

## 2023-02-09 NOTE — NURSING NOTE
"Oncology Rooming Note    February 9, 2023 10:03 AM   Sonny Rush is a 67 year old female who presents for:    Chief Complaint   Patient presents with     Oncology Clinic Visit     Cavitary lesion of lung     Initial Vitals: /81   Pulse 81   Temp 97.6  F (36.4  C) (Oral)   Resp 16   SpO2 100%  Estimated body mass index is 26.47 kg/m  as calculated from the following:    Height as of 9/22/22: 1.676 m (5' 6\").    Weight as of 9/22/22: 74.4 kg (164 lb). There is no height or weight on file to calculate BSA.  Moderate Pain (5) Comment: Data Unavailable   No LMP recorded. Patient is postmenopausal.  Allergies reviewed: Yes  Medications reviewed: Yes    Medications: Medication refills not needed today.  Pharmacy name entered into [employer]:    [id number] [place], [place] - [address] [id number] - [place], [place] - [address] AT [address]    Clinical concerns: none       Lanette Sarmiento CMA            "

## 2023-02-09 NOTE — LETTER
2/9/2023         RE: Sonny Rush  [address]  [facility] [address]        Dear Colleague,    Thank you for referring your patient, Sonny Rush, to the [facility]. Please see a copy of my visit note below.    THORACIC SURGERY - NEW PATIENT OFFICE VISIT      Dear Dr. Wang,    I saw Ms Rush at Dr Templeton's request in consultation for the evaluation and treatment of a cavitary nodule of the LEFT lower lobe.    HPI  Ms Rush is a 67 year old female presenting for evaluation for surgical removal of a left lower lobe cavitary lung nodule. Ms Rush experienced a fall at home in Sept 2022 for which she presented to hospital. She underwent CT scan which identified a left lower lung cavitary nodule. She reports a dry cough which she has has for the last 2 years. Otherwise, she denies any symptoms including fever, chills, chest pain, weight loss, hemoptysis. She is functionally active and is independent in all her activities of daily living.             ECOG performance status  0- Fully active, without restriction                 Previsit Tests   PFT (09/22/2022): FEV1 97% predicted, 2.3 L, DLCO 103%    Pathology: no pathology available  CT scan (1/9/2023): peripheral LEFT lower lobe 2.3x1.5cm cavitary pulmonary Nodule, without mediastinal adenopathy, with no pleural effusion         PET scan (1/9/2023): No FDG avid nodules in the chest or elsewhere. No suspicious hypermetabolic activity elsewhere  Brain MRI: No brain MRI  Covid vaccination status: Vaccinated    PMH  Reviewed, as below    Past Medical History:   Diagnosis Date     Anxiety      Malignant neoplasm (H)      Meniere's disease      Migraines     ON NEURONTIN, sees neurologist     Mild depression     sees psych     PMB (postmenopausal bleeding) 2006    Had  D & C 2/06 showing inactive endometrium         PSH  Reviewed, as below    Past Surgical History:   Procedure Laterality Date     BIOPSY BREAST  SEED LOCALIZATION  7/3/2012    Procedure: BIOPSY BREAST SEED LOCALIZATION;  RIGHT BREAST LUMPECTOMY WITH ULTRASOUND SEED LOCALIZATION;  Surgeon: Jaclyn Dalal MD;  Location:  [facility]     BREAST BIOPSY, RT/LT  11/29/00    stereotactic bx right breast--fibrocystic change     COLONOSCOPY N/A 1/30/2017    Procedure: COMBINED COLONOSCOPY, SINGLE OR MULTIPLE BIOPSY/POLYPECTOMY BY BIOPSY;  Surgeon: Muriel Wolff MD;  Location:  GI     DILATION AND CURETTAGE  2/27/06    inactive endometrium on path; done for PMB     ENT SURGERY      ear tubes, rhinoplasty     LAPAROSCOPY      Age 27 for pelvic pain     ORTHOPEDIC SURGERY      hammertoe        Allergies   Allergen Reactions     Codeine Sulfate      Cough syrup     Depakote      Niacin      Prednisone       Current Outpatient Medications   Medication     buPROPion (WELLBUTRIN XL) 150 MG 24 hr tablet     busPIRone (BUSPAR) 15 MG tablet     chlorthalidone (HYGROTON) 25 MG tablet     folic acid (FOLVITE) 1 MG tablet     levothyroxine (SYNTHROID/LEVOTHROID) 88 MCG tablet     losartan (COZAAR) 100 MG tablet     verapamil ER (VERELAN) 180 MG 24 hr capsule     cloNIDine (CATAPRES-TTS1) 0.1 MG/24HR WK patch     HydrOXYzine Pamoate (VISTARIL PO)     mirtazapine (REMERON) 7.5 MG tablet     ondansetron (ZOFRAN-ODT) 4 MG ODT tab     rizatriptan (MAXALT-MLT) 10 MG ODT     tiZANidine (ZANAFLEX) 2 MG tablet     valACYclovir (VALTREX) 500 MG tablet     No current facility-administered medications for this visit.      Lives with her , no children  ETOH: occasional, 1-2x/month  TOBACCO: Non-smoker, social smoker approx 30y ago, unknown duration  OTHER DRUGS: None    Physical examination  /81   Pulse 81   Temp 97.6  F (36.4  C) (Oral)   Resp 16   SpO2 100%    Gen: well appearing 66yo, appears younger than stated age, NAD, anxious, normal body habitus  Resp: NLB on RA  CV: rrr, wwp  Abdo: soft, ND, NT      IMPRESSION   66yo female with  LEFT lower lobe cavitary  nodule. CT and PET imaging did not identify any surrounding lymph node lesion or distant metastases.        PLAN  I spent 45 min on the date of the encounter in chart review, patient visit, review of tests, documentation and/or discussion with other providers about the issues documented above. I reviewed the plan as follows:  We discussed options of biopsy (possible sampling error with cavitary lesion) vs observation (not recommended) vs surgery.   SHe has been quite anxious about it and wants it removed      Procedure planned: Robotic LEFTwedge resection, possible lobectomy, with possible  thoracotomy.  I reviewed the indications, risks, and benefits of the procedure with Ms. Sonny Rush. We discussed the intraoperative risks of bleeding and injury to vital organs, potential postoperative complications including, but not limited to, major respiratory events, arrhythmia, bleeding, infection, reoperation, and death. I explained the anticipated hospital course (+/- 0-3 days) and postoperative recovery including pain control, chest drain management, and variable degrees of dyspnea (or need for supplemental oxygen) and fatigue that tend to get better with time.     lNecessary Preop Tests & Appointments: Preoperative assessment clinic  Regional Anesthesia Plan: Intraoperative intercostal nerve block  Anticoagulation Plan: Prophylactic Lovenox    I appreciate the opportunity to participate in the care of your patient and will keep you updated.    Tran Hays MD

## 2023-02-09 NOTE — PROGRESS NOTES
THORACIC SURGERY - NEW PATIENT OFFICE VISIT      Dear Dr. Wang,    I saw Ms Rush at Dr Templeton's request in consultation for the evaluation and treatment of a cavitary nodule of the LEFT lower lobe.    HPI  Ms Rush is a 67 year old female presenting for evaluation for surgical removal of a left lower lobe cavitary lung nodule. Ms Rush experienced a fall at home in Sept 2022 for which she presented to hospital. She underwent CT scan which identified a left lower lung cavitary nodule. She reports a dry cough which she has has for the last 2 years. Otherwise, she denies any symptoms including fever, chills, chest pain, weight loss, hemoptysis. She is functionally active and is independent in all her activities of daily living.             ECOG performance status  0- Fully active, without restriction                 Previsit Tests   PFT (09/22/2022): FEV1 97% predicted, 2.3 L, DLCO 103%    Pathology: no pathology available  CT scan (1/9/2023): peripheral LEFT lower lobe 2.3x1.5cm cavitary pulmonary Nodule, without mediastinal adenopathy, with no pleural effusion         PET scan (1/9/2023): No FDG avid nodules in the chest or elsewhere. No suspicious hypermetabolic activity elsewhere  Brain MRI: No brain MRI  Covid vaccination status: Vaccinated    PMH  Reviewed, as below    Past Medical History:   Diagnosis Date     Anxiety      Malignant neoplasm (H)      Meniere's disease      Migraines     ON NEURONTIN, sees neurologist     Mild depression     sees psych     PMB (postmenopausal bleeding) 2006    Had  D & C 2/06 showing inactive endometrium         PSH  Reviewed, as below    Past Surgical History:   Procedure Laterality Date     BIOPSY BREAST SEED LOCALIZATION  7/3/2012    Procedure: BIOPSY BREAST SEED LOCALIZATION;  RIGHT BREAST LUMPECTOMY WITH ULTRASOUND SEED LOCALIZATION;  Surgeon: Jaclyn Dalal MD;  Location:  [facility]     BREAST BIOPSY, RT/LT  11/29/00    stereotactic bx right breast--fibrocystic  change     COLONOSCOPY N/A 1/30/2017    Procedure: COMBINED COLONOSCOPY, SINGLE OR MULTIPLE BIOPSY/POLYPECTOMY BY BIOPSY;  Surgeon: Muriel Wolff MD;  Location:  GI     DILATION AND CURETTAGE  2/27/06    inactive endometrium on path; done for PMB     ENT SURGERY      ear tubes, rhinoplasty     LAPAROSCOPY      Age 27 for pelvic pain     ORTHOPEDIC SURGERY      hammertoe        Allergies   Allergen Reactions     Codeine Sulfate      Cough syrup     Depakote      Niacin      Prednisone       Current Outpatient Medications   Medication     buPROPion (WELLBUTRIN XL) 150 MG 24 hr tablet     busPIRone (BUSPAR) 15 MG tablet     chlorthalidone (HYGROTON) 25 MG tablet     folic acid (FOLVITE) 1 MG tablet     levothyroxine (SYNTHROID/LEVOTHROID) 88 MCG tablet     losartan (COZAAR) 100 MG tablet     verapamil ER (VERELAN) 180 MG 24 hr capsule     cloNIDine (CATAPRES-TTS1) 0.1 MG/24HR WK patch     HydrOXYzine Pamoate (VISTARIL PO)     mirtazapine (REMERON) 7.5 MG tablet     ondansetron (ZOFRAN-ODT) 4 MG ODT tab     rizatriptan (MAXALT-MLT) 10 MG ODT     tiZANidine (ZANAFLEX) 2 MG tablet     valACYclovir (VALTREX) 500 MG tablet     No current facility-administered medications for this visit.      Lives with her , no children  ETOH: occasional, 1-2x/month  TOBACCO: Non-smoker, social smoker approx 30y ago, unknown duration  OTHER DRUGS: None    Physical examination  /81   Pulse 81   Temp 97.6  F (36.4  C) (Oral)   Resp 16   SpO2 100%    Gen: well appearing 68yo, appears younger than stated age, NAD, anxious, normal body habitus  Resp: NLB on RA  CV: rrr, wwp  Abdo: soft, ND, NT      IMPRESSION   68yo female with  LEFT lower lobe cavitary nodule. CT and PET imaging did not identify any surrounding lymph node lesion or distant metastases.        PLAN  I spent 45 min on the date of the encounter in chart review, patient visit, review of tests, documentation and/or discussion with other providers about the  issues documented above. I reviewed the plan as follows:  We discussed options of biopsy (possible sampling error with cavitary lesion) vs observation (not recommended) vs surgery.   SHe has been quite anxious about it and wants it removed      Procedure planned: Robotic LEFTwedge resection, possible lobectomy, with possible  thoracotomy.  I reviewed the indications, risks, and benefits of the procedure with Ms. Sonny Rush. We discussed the intraoperative risks of bleeding and injury to vital organs, potential postoperative complications including, but not limited to, major respiratory events, arrhythmia, bleeding, infection, reoperation, and death. I explained the anticipated hospital course (+/- 0-3 days) and postoperative recovery including pain control, chest drain management, and variable degrees of dyspnea (or need for supplemental oxygen) and fatigue that tend to get better with time.     lNecessary Preop Tests & Appointments: Preoperative assessment clinic  Regional Anesthesia Plan: Intraoperative intercostal nerve block  Anticoagulation Plan: Prophylactic Lovenox    I appreciate the opportunity to participate in the care of your patient and will keep you updated.  Sincerely,

## 2023-02-10 ENCOUNTER — TELEPHONE (OUTPATIENT)
Dept: SURGERY | Facility: CLINIC | Age: 68
End: 2023-02-10
Payer: COMMERCIAL

## 2023-02-10 NOTE — TELEPHONE ENCOUNTER
Spoke with patient to schedule procedure with Dr. Hays   Procedure was scheduled on 03/24 at [facility] [place]  Patient will have H&P with PAC    Patient is aware a COVID-19 test is needed before their procedure.   (Patient is aware IP/Thoracic are still requiring COVID-19 test)     Patient will complete a PCR COVID-19 test due to inpatient status, patient will schedule at: [facility]    Patient is aware a / is needed day of surgery.   Surgery Letter was sent via [employer],     Patient has my direct contact information for any further questions.     Appointments in Next Year    Mar 20, 2023  7:15 AM  (Arrive by 7:00 AM)  PAC EVALUATION with Cristal Dean PA-C  [facility] [place] ([facility] ) [phone]   Mar 20, 2023  8:30 AM  Testing Only with [facility] COVID LAB  [facility] [place] ([facility] ) [phone]   Apr 21, 2023 10:30 AM  (Arrive by 10:15 AM)  Return Patient with KASH Landeros [facility] ([facility] ) [phone]

## 2023-02-13 DIAGNOSIS — R91.1 LUNG NODULE: Primary | ICD-10-CM

## 2023-02-13 NOTE — TELEPHONE ENCOUNTER
FUTURE VISIT INFORMATION      SURGERY INFORMATION:    Date: 3/24/23    Location: [facility] or    Surgeon:  Tran Hays MD    Anesthesia Type:  general    Procedure: LOBECTOMY, LUNG, ROBOT-ASSISTED- wedge resection, possible lobectomy    Consult: ov     RECORDS REQUESTED FROM:       Primary Care Provider: Olga Wang PA-C    Most recent EKG+ Tracin22    Most recent ECHO: 22    Most recent PFT's: 22

## 2023-02-20 ENCOUNTER — MEDICAL CORRESPONDENCE (OUTPATIENT)
Dept: HEALTH INFORMATION MANAGEMENT | Facility: CLINIC | Age: 68
End: 2023-02-20
Payer: COMMERCIAL

## 2023-03-01 DIAGNOSIS — M81.0 OSTEOPOROSIS: Primary | ICD-10-CM

## 2023-03-01 RX ORDER — MEPERIDINE HYDROCHLORIDE 25 MG/ML
25 INJECTION INTRAMUSCULAR; INTRAVENOUS; SUBCUTANEOUS EVERY 30 MIN PRN
Status: CANCELLED | OUTPATIENT
Start: 2023-03-01

## 2023-03-01 RX ORDER — HEPARIN SODIUM (PORCINE) LOCK FLUSH IV SOLN 100 UNIT/ML 100 UNIT/ML
5 SOLUTION INTRAVENOUS
Status: CANCELLED | OUTPATIENT
Start: 2023-03-01

## 2023-03-01 RX ORDER — ALBUTEROL SULFATE 0.83 MG/ML
2.5 SOLUTION RESPIRATORY (INHALATION)
Status: CANCELLED | OUTPATIENT
Start: 2023-03-01

## 2023-03-01 RX ORDER — ALBUTEROL SULFATE 90 UG/1
1-2 AEROSOL, METERED RESPIRATORY (INHALATION)
Status: CANCELLED
Start: 2023-03-01

## 2023-03-01 RX ORDER — METHYLPREDNISOLONE SODIUM SUCCINATE 125 MG/2ML
125 INJECTION, POWDER, LYOPHILIZED, FOR SOLUTION INTRAMUSCULAR; INTRAVENOUS
Status: CANCELLED
Start: 2023-03-01

## 2023-03-01 RX ORDER — ZOLEDRONIC ACID 5 MG/100ML
5 INJECTION, SOLUTION INTRAVENOUS ONCE
Status: CANCELLED
Start: 2023-03-01

## 2023-03-01 RX ORDER — EPINEPHRINE 1 MG/ML
0.3 INJECTION, SOLUTION, CONCENTRATE INTRAVENOUS EVERY 5 MIN PRN
Status: CANCELLED | OUTPATIENT
Start: 2023-03-01

## 2023-03-01 RX ORDER — HEPARIN SODIUM,PORCINE 10 UNIT/ML
5 VIAL (ML) INTRAVENOUS
Status: CANCELLED | OUTPATIENT
Start: 2023-03-01

## 2023-03-01 RX ORDER — DIPHENHYDRAMINE HYDROCHLORIDE 50 MG/ML
50 INJECTION INTRAMUSCULAR; INTRAVENOUS
Status: CANCELLED
Start: 2023-03-01

## 2023-03-13 ENCOUNTER — INFUSION THERAPY VISIT (OUTPATIENT)
Dept: INFUSION THERAPY | Facility: CLINIC | Age: 68
End: 2023-03-13
Attending: INTERNAL MEDICINE
Payer: COMMERCIAL

## 2023-03-13 VITALS
OXYGEN SATURATION: 100 % | DIASTOLIC BLOOD PRESSURE: 94 MMHG | SYSTOLIC BLOOD PRESSURE: 135 MMHG | TEMPERATURE: 97.9 F | HEART RATE: 71 BPM | RESPIRATION RATE: 16 BRPM

## 2023-03-13 DIAGNOSIS — M81.0 OSTEOPOROSIS: Primary | ICD-10-CM

## 2023-03-13 DIAGNOSIS — J98.4 CAVITARY LESION OF LUNG: Primary | ICD-10-CM

## 2023-03-13 LAB
ALBUMIN SERPL BCG-MCNC: 4.5 G/DL (ref 3.5–5.2)
CALCIUM SERPL-MCNC: 9.7 MG/DL (ref 8.8–10.2)
CREAT SERPL-MCNC: 1.21 MG/DL (ref 0.51–0.95)
GFR SERPL CREATININE-BSD FRML MDRD: 48 ML/MIN/1.73M2
HOLD SPECIMEN: NORMAL

## 2023-03-13 PROCEDURE — 36415 COLL VENOUS BLD VENIPUNCTURE: CPT | Performed by: INTERNAL MEDICINE

## 2023-03-13 PROCEDURE — 82310 ASSAY OF CALCIUM: CPT | Performed by: INTERNAL MEDICINE

## 2023-03-13 PROCEDURE — 82565 ASSAY OF CREATININE: CPT | Performed by: INTERNAL MEDICINE

## 2023-03-13 PROCEDURE — 82040 ASSAY OF SERUM ALBUMIN: CPT | Performed by: INTERNAL MEDICINE

## 2023-03-13 PROCEDURE — 96365 THER/PROPH/DIAG IV INF INIT: CPT

## 2023-03-13 PROCEDURE — 250N000011 HC RX IP 250 OP 636: Performed by: INTERNAL MEDICINE

## 2023-03-13 RX ORDER — DIPHENHYDRAMINE HYDROCHLORIDE 50 MG/ML
50 INJECTION INTRAMUSCULAR; INTRAVENOUS
Status: CANCELLED
Start: 2023-03-13

## 2023-03-13 RX ORDER — METHYLPREDNISOLONE SODIUM SUCCINATE 125 MG/2ML
125 INJECTION, POWDER, LYOPHILIZED, FOR SOLUTION INTRAMUSCULAR; INTRAVENOUS
Status: CANCELLED
Start: 2023-03-13

## 2023-03-13 RX ORDER — ZOLEDRONIC ACID 5 MG/100ML
5 INJECTION, SOLUTION INTRAVENOUS ONCE
Status: CANCELLED
Start: 2023-03-13

## 2023-03-13 RX ORDER — ZOLEDRONIC ACID 5 MG/100ML
5 INJECTION, SOLUTION INTRAVENOUS ONCE
Status: COMPLETED | OUTPATIENT
Start: 2023-03-13 | End: 2023-03-13

## 2023-03-13 RX ORDER — ALBUTEROL SULFATE 90 UG/1
1-2 AEROSOL, METERED RESPIRATORY (INHALATION)
Status: CANCELLED
Start: 2023-03-13

## 2023-03-13 RX ORDER — ALBUTEROL SULFATE 0.83 MG/ML
2.5 SOLUTION RESPIRATORY (INHALATION)
Status: CANCELLED | OUTPATIENT
Start: 2023-03-13

## 2023-03-13 RX ORDER — HEPARIN SODIUM,PORCINE 10 UNIT/ML
5 VIAL (ML) INTRAVENOUS
Status: CANCELLED | OUTPATIENT
Start: 2023-03-13

## 2023-03-13 RX ORDER — EPINEPHRINE 1 MG/ML
0.3 INJECTION, SOLUTION INTRAMUSCULAR; SUBCUTANEOUS EVERY 5 MIN PRN
Status: CANCELLED | OUTPATIENT
Start: 2023-03-13

## 2023-03-13 RX ORDER — HEPARIN SODIUM (PORCINE) LOCK FLUSH IV SOLN 100 UNIT/ML 100 UNIT/ML
5 SOLUTION INTRAVENOUS
Status: CANCELLED | OUTPATIENT
Start: 2023-03-13

## 2023-03-13 RX ORDER — MEPERIDINE HYDROCHLORIDE 25 MG/ML
25 INJECTION INTRAMUSCULAR; INTRAVENOUS; SUBCUTANEOUS EVERY 30 MIN PRN
Status: CANCELLED | OUTPATIENT
Start: 2023-03-13

## 2023-03-13 RX ADMIN — ZOLEDRONIC ACID 5 MG: 0.05 INJECTION, SOLUTION INTRAVENOUS at 14:23

## 2023-03-13 ASSESSMENT — PAIN SCALES - GENERAL: PAINLEVEL: MODERATE PAIN (5)

## 2023-03-13 NOTE — PROGRESS NOTES
Infusion Nursing Note:  Sonny Rush presents today for first reclast.    Patient seen by provider today: No   present during visit today: Not Applicable.    Note: Written info reviewed and given to patient for reclast.    Intravenous Access:  Peripheral IV placed.    Treatment Conditions:  Lab Results   Component Value Date     (L) 12/13/2022    POTASSIUM 3.7 12/13/2022    CR 1.21 (H) 03/13/2023    DARRICK 9.7 03/13/2023    BILITOTAL 0.4 02/07/2007    ALBUMIN 4.5 03/13/2023    ALT 33 02/07/2007    AST 29 02/07/2007     Results reviewed, labs MET treatment parameters, ok to proceed with treatment.    Post Infusion Assessment:  Patient tolerated infusion without incident.  Blood return noted pre and post infusion.  Site patent and intact, free from redness, edema or discomfort.  No evidence of extravasations.  Access discontinued per protocol.     Discharge Plan:   AVS to patient via MYCHART.  Patient will return prn for next appointment.   Patient discharged in stable condition accompanied by: self.  Departure Mode: Ambulatory.      Pepe Hernandes RN

## 2023-03-13 NOTE — PROGRESS NOTES
Medical Assistant Note:  Sonny Rush presents today for lab draw.    Patient seen by provider today: No.   present during visit today: Not Applicable.    Concerns: No Concerns.    Procedure:  Lab draw site: RAC, Needle type: BF, Gauge: 21. Gauze and coban applied    Post Assessment:  Labs drawn without difficulty: Yes.    Discharge Plan:  Departure Mode: Ambulatory.    Face to Face Time: 5.    Poly Quarles CMA

## 2023-03-14 ENCOUNTER — PATIENT OUTREACH (OUTPATIENT)
Dept: SURGERY | Facility: CLINIC | Age: 68
End: 2023-03-14
Payer: COMMERCIAL

## 2023-03-14 NOTE — TELEPHONE ENCOUNTER
"Received voicemail message from patient inquiring why she needed to have another CT scan done when she had one in January.  Called and spoke with patient. Explained that the surgeons required updated imaging prior to lung surgery and to be \"updated\" means to be less than 6 weeks old. Patient verbalized her understanding. Informed writer that she has a CT scheduled for Thursday. Patient appreciated writer's return call.     Aurelia Shipley RN, BSN  Thoracic Surgery RN Care Coordinator    "

## 2023-03-16 ENCOUNTER — ANCILLARY PROCEDURE (OUTPATIENT)
Dept: CT IMAGING | Facility: CLINIC | Age: 68
End: 2023-03-16
Attending: CLINICAL NURSE SPECIALIST
Payer: COMMERCIAL

## 2023-03-16 DIAGNOSIS — J98.4 CAVITARY LESION OF LUNG: ICD-10-CM

## 2023-03-16 PROCEDURE — 71250 CT THORAX DX C-: CPT

## 2023-03-17 RX ORDER — ONDANSETRON 8 MG/1
8 TABLET, ORALLY DISINTEGRATING ORAL EVERY 8 HOURS PRN
COMMUNITY

## 2023-03-17 RX ORDER — VALACYCLOVIR HYDROCHLORIDE 1 G/1
1000 TABLET, FILM COATED ORAL 2 TIMES DAILY PRN
COMMUNITY

## 2023-03-17 RX ORDER — ACETAMINOPHEN 500 MG
500-1000 TABLET ORAL EVERY 8 HOURS PRN
Status: ON HOLD | COMMUNITY
End: 2023-03-27

## 2023-03-17 NOTE — PROGRESS NOTES
Preoperative Assessment Center Medication History Note    Medication history completed on March 17, 2023 by this writer. See Epic admission navigator for prior to admission medications. Operating room staff will still need to confirm medications and last dose information on day of surgery.     Medication history interview sources  Patient interview: Yes  Care Everywhere records: No  Surescripts pharmacy refill records: Yes  Other (if applicable):     Changes made to PTA medication list  Added: reclast, acetaminophen,   Deleted: none  Changed: bupropion dose, buspar dose/sig, chlorthalidone, clonidine dose/sig, mirtazapine dose/sig, zofran dose/sig,  Tizanidine sig, valtrex,     Additional medication history information (including reliability of information, actions taken by pharmacist):    -- No recent (within 30 days) course of antibiotics  -- No recent (within 30 days) course of systemic steroids  -- Reports not being on blood thinning medications    -- Declines being on any other prescription or over-the-counter medications    Prior to Admission medications    Medication Sig Last Dose Taking? Auth Provider Long Term End Date   acetaminophen (TYLENOL) 500 MG tablet Take 500-1,000 mg by mouth every 8 hours as needed for mild pain Taking Yes Unknown, Entered By History     buPROPion (WELLBUTRIN XL) 150 MG 24 hr tablet Take 150 mg by mouth every morning Taking Yes Reported, Patient     busPIRone (BUSPAR) 15 MG tablet Take 15 mg by mouth 2 times daily Taking Yes Reported, Patient Yes    chlorthalidone (HYGROTON) 25 MG tablet Take 25 mg by mouth daily Taking Yes Reported, Patient No    cloNIDine (CATAPRES-TTS1) 0.1 MG/24HR WK patch Place 1 patch onto the skin once a week Changes on Sat morning Taking Yes Reported, Patient     levothyroxine (SYNTHROID/LEVOTHROID) 88 MCG tablet Take 88 mcg by mouth daily Taking Yes Reported, Patient Yes    losartan (COZAAR) 100 MG tablet Take 100 mg by mouth daily Taking Yes Reported,  Patient No    mirtazapine (REMERON) 7.5 MG tablet Take 7.5 mg by mouth nightly as needed (insomnia) Taking Yes Reported, Patient No    ondansetron (ZOFRAN ODT) 8 MG ODT tab Take 8 mg by mouth every 8 hours as needed for nausea Taking Yes Unknown, Entered By History     rizatriptan (MAXALT-MLT) 10 MG ODT Take 1 tablet by mouth as needed Taking Yes Reported, Patient     tiZANidine (ZANAFLEX) 2 MG tablet Take 1-3 tablets by mouth nightly as needed Taking Yes Reported, Patient     valACYclovir (VALTREX) 1000 mg tablet Take 1,000 mg by mouth 2 times daily as needed (cold sores) Taking Yes Unknown, Entered By History     verapamil ER (VERELAN) 180 MG 24 hr capsule Take 2 capsules (360 mg) by mouth At Bedtime Taking Yes Shannon Cain, CNP Yes    Zoledronic Acid (RECLAST IV) Inject 5 mg into the vein once yearly Taking Yes Unknown, Entered By History     folic acid (FOLVITE) 1 MG tablet Take 1 mg by mouth every other day  Patient not taking: Reported on 3/17/2023 Not Taking  Reported, Patient     HydrOXYzine Pamoate (VISTARIL PO) Take 25 mg by mouth as needed  Patient not taking: Reported on 2/9/2023 Not Taking  Reported, Patient               Medication history completed by: Venkatesh Whipple [facility]

## 2023-03-19 LAB
ABO/RH(D): NORMAL
ANTIBODY SCREEN: NEGATIVE
SPECIMEN EXPIRATION DATE: NORMAL

## 2023-03-20 ENCOUNTER — ANESTHESIA EVENT (OUTPATIENT)
Dept: SURGERY | Facility: CLINIC | Age: 68
DRG: 164 | End: 2023-03-20
Payer: COMMERCIAL

## 2023-03-20 ENCOUNTER — LAB (OUTPATIENT)
Dept: LAB | Facility: CLINIC | Age: 68
End: 2023-03-20
Payer: COMMERCIAL

## 2023-03-20 ENCOUNTER — PRE VISIT (OUTPATIENT)
Dept: SURGERY | Facility: CLINIC | Age: 68
End: 2023-03-20

## 2023-03-20 ENCOUNTER — OFFICE VISIT (OUTPATIENT)
Dept: SURGERY | Facility: CLINIC | Age: 68
End: 2023-03-20
Payer: COMMERCIAL

## 2023-03-20 VITALS
TEMPERATURE: 97.8 F | RESPIRATION RATE: 16 BRPM | DIASTOLIC BLOOD PRESSURE: 86 MMHG | WEIGHT: 166.6 LBS | OXYGEN SATURATION: 98 % | SYSTOLIC BLOOD PRESSURE: 133 MMHG | HEART RATE: 75 BPM | HEIGHT: 66 IN | BODY MASS INDEX: 26.78 KG/M2

## 2023-03-20 DIAGNOSIS — R91.1 LUNG NODULE: ICD-10-CM

## 2023-03-20 DIAGNOSIS — Z20.822 ENCOUNTER FOR LABORATORY TESTING FOR COVID-19 VIRUS: ICD-10-CM

## 2023-03-20 DIAGNOSIS — Z01.818 PRE-OP EXAMINATION: ICD-10-CM

## 2023-03-20 DIAGNOSIS — C34.82 MALIGNANT NEOPLASM OF OVERLAPPING SITES OF LEFT LUNG (H): ICD-10-CM

## 2023-03-20 DIAGNOSIS — Z01.818 PRE-OP EXAMINATION: Primary | ICD-10-CM

## 2023-03-20 LAB
ANION GAP SERPL CALCULATED.3IONS-SCNC: 10 MMOL/L (ref 7–15)
BUN SERPL-MCNC: 9.4 MG/DL (ref 8–23)
CALCIUM SERPL-MCNC: 9 MG/DL (ref 8.8–10.2)
CHLORIDE SERPL-SCNC: 92 MMOL/L (ref 98–107)
CREAT SERPL-MCNC: 1.11 MG/DL (ref 0.51–0.95)
DEPRECATED HCO3 PLAS-SCNC: 27 MMOL/L (ref 22–29)
ERYTHROCYTE [DISTWIDTH] IN BLOOD BY AUTOMATED COUNT: 11.9 % (ref 10–15)
FERRITIN SERPL-MCNC: 81 NG/ML (ref 11–328)
GFR SERPL CREATININE-BSD FRML MDRD: 54 ML/MIN/1.73M2
GLUCOSE SERPL-MCNC: 110 MG/DL (ref 70–99)
HCT VFR BLD AUTO: 36 % (ref 35–47)
HGB BLD-MCNC: 12.3 G/DL (ref 11.7–15.7)
HOLD SPECIMEN: NORMAL
IRON BINDING CAPACITY (ROCHE): 337 UG/DL (ref 240–430)
IRON SATN MFR SERPL: 31 % (ref 15–46)
IRON SERPL-MCNC: 105 UG/DL (ref 37–145)
MCH RBC QN AUTO: 32.2 PG (ref 26.5–33)
MCHC RBC AUTO-ENTMCNC: 34.2 G/DL (ref 31.5–36.5)
MCV RBC AUTO: 94 FL (ref 78–100)
PLATELET # BLD AUTO: 402 10E3/UL (ref 150–450)
POTASSIUM SERPL-SCNC: 4.1 MMOL/L (ref 3.4–5.3)
RBC # BLD AUTO: 3.82 10E6/UL (ref 3.8–5.2)
SARS-COV-2 RNA RESP QL NAA+PROBE: NEGATIVE
SODIUM SERPL-SCNC: 129 MMOL/L (ref 136–145)
WBC # BLD AUTO: 6.8 10E3/UL (ref 4–11)

## 2023-03-20 PROCEDURE — 82728 ASSAY OF FERRITIN: CPT | Performed by: PHYSICIAN ASSISTANT

## 2023-03-20 PROCEDURE — U0005 INFEC AGEN DETEC AMPLI PROBE: HCPCS | Performed by: FAMILY MEDICINE

## 2023-03-20 PROCEDURE — 36415 COLL VENOUS BLD VENIPUNCTURE: CPT | Performed by: PATHOLOGY

## 2023-03-20 PROCEDURE — 85027 COMPLETE CBC AUTOMATED: CPT | Performed by: PATHOLOGY

## 2023-03-20 PROCEDURE — 86850 RBC ANTIBODY SCREEN: CPT | Performed by: PHYSICIAN ASSISTANT

## 2023-03-20 PROCEDURE — 83550 IRON BINDING TEST: CPT | Performed by: PATHOLOGY

## 2023-03-20 PROCEDURE — 99203 OFFICE O/P NEW LOW 30 MIN: CPT | Performed by: PHYSICIAN ASSISTANT

## 2023-03-20 PROCEDURE — 86901 BLOOD TYPING SEROLOGIC RH(D): CPT | Performed by: PHYSICIAN ASSISTANT

## 2023-03-20 PROCEDURE — 83540 ASSAY OF IRON: CPT | Performed by: PATHOLOGY

## 2023-03-20 PROCEDURE — 80048 BASIC METABOLIC PNL TOTAL CA: CPT | Performed by: PATHOLOGY

## 2023-03-20 RX ORDER — CHLORHEXIDINE GLUCONATE ORAL RINSE 1.2 MG/ML
15 SOLUTION DENTAL ONCE
Status: CANCELLED | OUTPATIENT
Start: 2023-03-20 | End: 2023-03-20

## 2023-03-20 RX ORDER — ACETAMINOPHEN 325 MG/1
975 TABLET ORAL ONCE
Status: CANCELLED | OUTPATIENT
Start: 2023-03-20 | End: 2023-03-20

## 2023-03-20 RX ORDER — GABAPENTIN 100 MG/1
100 CAPSULE ORAL
Status: CANCELLED | OUTPATIENT
Start: 2023-03-20

## 2023-03-20 RX ORDER — CELECOXIB 200 MG/1
200 CAPSULE ORAL ONCE
Status: CANCELLED | OUTPATIENT
Start: 2023-03-20 | End: 2023-03-20

## 2023-03-20 ASSESSMENT — LIFESTYLE VARIABLES: TOBACCO_USE: 1

## 2023-03-20 ASSESSMENT — PAIN SCALES - GENERAL: PAINLEVEL: NO PAIN (0)

## 2023-03-20 ASSESSMENT — ENCOUNTER SYMPTOMS: SEIZURES: 0

## 2023-03-20 NOTE — PATIENT INSTRUCTIONS
Preparing for Your Surgery      Name:  Sonny Rush   MRN:  [id number]   :  1955   Today's Date:  3/20/2023       Arriving for surgery:  Surgery date:  2023  Arrival time:  5:30 am     Surgeries and procedures: Adult patients can have 2 visitors all through the surgery process.     Visiting hours: 8 a.m. to 8:30 p.m.     Hospital: Adult patients and children under age 18 can have 4 visitor at a time     No visitors under the age of 5 are allowed for hospital patients.  Double occupancy rooms: Patients can have only two visitors at a time.     Patients with disabilities: Can have a support person with them (family member, service provider     Or someone well informed about their needs) plus the allowed number of visitors     Patients confirmed or suspected to have symptoms of COVID 19 or flu:     No visitors allowed for adult patients.   Children (under age 18) can have 1 named visitor.     People who are sick or showing symptoms of COVID 19 or flu:    Are not allowed to visit patients--we can only make exceptions in special situations.       Please follow these guidelines for your visit:   Arrive wearing a mask over your mouth and nose; we will give you a medical mask to wear    If you arrive wearing a cloth mask.   Keep it on during your entire visit, even when in patient's room.   If you don't wear a mask we'll ask you to leave.     Clean your hands with alcohol hand . Do this when you arrive at and leave the building and patient room,    And again after you touch your mask or anything in the room.     You can t visit if you have a fever, cough, shortness of breath, muscle aches, headaches, sore throat    Or diarrhea      Stay 6 feet away from others during your visit and between visits     Go directly to and from the room you are visiting.     Stay in the patient s room during your visit. Limit going to other places in the hospital as much as possible     Leave bags and jackets at  home or in the car.     For everyone s health, please don t come and go during your visit. That includes for smoking   during your visit.     Please come to:     [facility] [address]  [address] [place]  [address]    - ? parking is available in front of the hospital      -    Please proceed to [address] on the 3rd floor. [phone]?     - ?If you are in need of directions, wheelchair or escort please stop at the Information Desk in the lobby.        What can I eat or drink?  -  You may eat and drink normally up to 8 hours prior to arrival time. (Until 3/23/23 at 11 pm)  -  You may have clear liquids until 2 hours prior to arrival time. (Until 3::30 am)    Examples of clear liquids:  Water  Clear broth  Juices (apple, white grape, white cranberry  and cider) without pulp  Noncarbonated, powder based beverages  (lemonade and Jeremiah-Aid)  Sodas (Sprite, 7-Up, ginger ale and seltzer)  Coffee or tea (without milk or cream)  Gatorade    -  No Alcohol for at least 24 hours before surgery.     Which medicines can I take?    Hold Aspirin for 7 days before surgery.   Hold Multivitamins for 7 days before surgery.  Hold Supplements for 7 days before surgery.  Hold Ibuprofen (Advil, Motrin) for  day(s) before surgery--unless otherwise directed by surgeon.  Hold Naproxen (Aleve) for 4 days before surgery.    Hold Rizatriptan for 24 hours before surgery     -  DO NOT take these medications the day of surgery:  Chlorthalidone  Losartan         -  PLEASE TAKE these medications the day of surgery:  Bupropion  Buspirone   Clonidine patch per your routine   Levothyroxine   Ondansetron if needed  Valacyclovir if needed         How do I prepare myself?  - Please take 2 showers (one the night prior to surgery and one the morning of surgery) using Scrubcare or Hibiclens soap.    Use this soap only from the neck to your toes.     Leave the soap on your skin for one  minute--then rinse thoroughly.      You may use your own shampoo and conditioner. No other hair products.   - Please remove all jewelry and body piercings.  - No lotions, deodorants or fragrance.  - No makeup or fingernail polish.   - Bring your ID and insurance card.    -If you have a Deep Brain Stimulator, Spinal Cord Stimulator, or any Neuro Stimulator device---you must bring the remote control to the hospital.        Covid testing policy as of 12/06/2022  Your surgeon will notify and schedule you for a COVID test if one is needed before surgery--please direct any questions or COVID symptoms to your surgeon      Questions or Concerns:    - For any questions regarding the day of surgery or your hospital stay, please contact the Pre Admission Nursing Office at [phone].       - If you have health changes between today and your surgery, please call your surgeon.       - For questions after surgery, please call your surgeons office.          Enhanced Recovery After Surgery     This is a team effort, including you, to get you back on your feet, eating and drinking      normally and out of the hospital as quickly as possible.  The goals are: 1) NO INFECTIONS and   2) RETURN TO NORMAL DIET    How can we achieve these goals?  1) STAY ACTIVE: Walk every day before your surgery; try to increase the amount every day.  Walk after surgery as much as you can-the nurses will help you.  Walking speeds healing and gets you home quicker, you heal better at home and have less risk of infection.     2) INCENTIVE SPIROMETER: Practice your incentive spirometer 4 times per day with 5 repetitions each time.  Using the incentive spirometer can strengthen your muscles between your ribs and help you have a strong cough after surgery.  A more effective cough can help prevent problems with your lungs.    3) STAY HYDRATED: Drink clear liquids up until 2 hours prior to arrival.     We would like you to purchase a drink such as Gatorade or  Ensure Clear (not the milkshake type).  Drink this before bedtime and the morning of surgery, drink between 8-10 ounces or until you feel hydrated.      Keeping well hydrated leads to your veins being plump, you wake up faster, and you are less likely to be nauseated. Start drinking water as soon as you can after surgery and advance to clear liquids and food as tolerated.  IV fluids contain salt, drinking fluids will minimize the amount of IV fluids you need and decrease the amount of salt you get.    The most common reason for the patient to be readmitted is dehydration. Staying hydrated after you go home from the hospital is very important.  Ensure or Ensure Clear are good options to keep you hydrated.     4) PAIN MANAGEMENT: If we minimize the amount of opioids and narcotics, and use regional blocks (which numb the area where your surgery is) along with oral pain medications; you will have less side effects of nausea and constipation. Narcotics can slow down your bowels and cause you to stay in the hospital longer.     Our goal is to keep you comfortable; eating and drinking normally and back home safely.

## 2023-03-20 NOTE — H&P
Pre-Operative H & P     CC:  Preoperative exam to assess for increased cardiopulmonary risk while undergoing surgery and anesthesia.    Date of Encounter: 3/20/2023  Primary Care Physician:  Olga Wang     Reason for visit:   Encounter Diagnoses   Name Primary?     Pre-op examination Yes     Lung nodule        HPI  Sonny Rush is a 67 year old female who presents for pre-operative H & P in preparation for  Procedure Information     Case: [id number] Date/Time: 03/24/23 0730    Procedure: LOBECTOMY, LUNG, ROBOT-ASSISTED- wedge resection, possible lobectomy (Left: Chest)    Anesthesia type: General    Diagnosis: Lung nodule [R91.1]    Pre-op diagnosis: Lung nodule [R91.1]    Location:  OR 03 /  OR    Providers: Tran Hays MD          The patient is a 67-year-old woman with a past medical history significant for hypertension, history of palpitations, former smoker, Ménière's disease, vertigo, migraines, hypothyroidism, osteopenia, chronic kidney disease stage III, history of DCIS status postlumpectomy, anxiety and depression.  The patient fell at home in September 2022 and went to the hospital.  CT of the scan showed a left lower lung nodule. She was discussed at the tumor conference and it was recommended to not undergo biopsy due to high risk of complications. She was seen by Dr. Hays 2/9/2023 to discuss her treatment options.  The patient is now scheduled for the procedure as above.    History is obtained from the patient and chart review    Hx of abnormal bleeding or anti-platelet use: none    Menstrual history: No LMP recorded. Patient is postmenopausal.:      Past Medical History  Past Medical History:   Diagnosis Date     Anxiety      CKD (chronic kidney disease) stage 3, GFR 30-59 ml/min (H)      DCIS (ductal carcinoma in situ)      HTN (hypertension)      Hypothyroidism      Malignant neoplasm (H)      Meniere's disease      Migraines     ON NEURONTIN, sees neurologist     Mild depression      sees psych     Osteopenia      Palpitations      PMB (postmenopausal bleeding) 01/01/2006    Had  D & C 2/06 showing inactive endometrium     Pulmonary nodules        Past Surgical History  Past Surgical History:   Procedure Laterality Date     BIOPSY BREAST SEED LOCALIZATION  7/3/2012    Procedure: BIOPSY BREAST SEED LOCALIZATION;  RIGHT BREAST LUMPECTOMY WITH ULTRASOUND SEED LOCALIZATION;  Surgeon: Jaclyn Dalal MD;  Location:  [facility]     BREAST BIOPSY, RT/LT  11/29/00    stereotactic bx right breast--fibrocystic change     COLONOSCOPY N/A 1/30/2017    Procedure: COMBINED COLONOSCOPY, SINGLE OR MULTIPLE BIOPSY/POLYPECTOMY BY BIOPSY;  Surgeon: Muriel Wolff MD;  Location:  GI     DILATION AND CURETTAGE  2/27/06    inactive endometrium on path; done for PMB     ENT SURGERY      ear tubes, rhinoplasty     LAPAROSCOPY      Age 27 for pelvic pain     ORTHOPEDIC SURGERY      hammertoe       Prior to Admission Medications  Current Outpatient Medications   Medication Sig Dispense Refill     acetaminophen (TYLENOL) 500 MG tablet Take 500-1,000 mg by mouth every 8 hours as needed for mild pain       buPROPion (WELLBUTRIN XL) 150 MG 24 hr tablet Take 150 mg by mouth every morning       busPIRone (BUSPAR) 15 MG tablet Take 15 mg by mouth 2 times daily       chlorthalidone (HYGROTON) 25 MG tablet Take 25 mg by mouth daily       cloNIDine (CATAPRES-TTS1) 0.1 MG/24HR WK patch Place 1 patch onto the skin once a week Changes on Sat morning       levothyroxine (SYNTHROID/LEVOTHROID) 88 MCG tablet Take 88 mcg by mouth daily       losartan (COZAAR) 100 MG tablet Take 100 mg by mouth daily       mirtazapine (REMERON) 7.5 MG tablet Take 7.5 mg by mouth nightly as needed (insomnia)       ondansetron (ZOFRAN ODT) 8 MG ODT tab Take 8 mg by mouth every 8 hours as needed for nausea       rizatriptan (MAXALT-MLT) 10 MG ODT Take 1 tablet by mouth as needed       tiZANidine (ZANAFLEX) 2 MG tablet Take 1-3 tablets by mouth  nightly as needed       valACYclovir (VALTREX) 1000 mg tablet Take 1,000 mg by mouth 2 times daily as needed (cold sores)       verapamil ER (VERELAN) 180 MG 24 hr capsule Take 2 capsules (360 mg) by mouth At Bedtime 180 capsule 3     Zoledronic Acid (RECLAST IV) Inject 5 mg into the vein once yearly       folic acid (FOLVITE) 1 MG tablet Take 1 mg by mouth every other day (Patient not taking: Reported on 3/17/2023)       HydrOXYzine Pamoate (VISTARIL PO) Take 25 mg by mouth as needed (Patient not taking: Reported on 2023)         Allergies  Allergies   Allergen Reactions     Codeine Sulfate      Cough syrup  Hyperactive, anxiety     Depakote      Pill form caused GI disturbance.      Niacin Itching     Prednisone Anxiety       Social History  Social History     Socioeconomic History     Marital status:      Spouse name: Virgilio     Number of children: 0     Years of education: Not on file     Highest education level: Not on file   Occupational History     Occupation:      Employer: [employer]   Tobacco Use     Smoking status: Former     Types: Cigarettes     Quit date:      Years since quittin.2     Smokeless tobacco: Never     Tobacco comments:     Smoked socially    Substance and Sexual Activity     Alcohol use: Yes     Comment: occasional     Drug use: No     Sexual activity: Never     Partners: Male     Birth control/protection: Post-menopausal   Other Topics Concern     Parent/sibling w/ CABG, MI or angioplasty before 65F 55M? Not Asked   Social History Narrative     Not on file     Social Determinants of Health     Financial Resource Strain: Not on file   Food Insecurity: Not on file   Transportation Needs: Not on file   Physical Activity: Not on file   Stress: Not on file   Social Connections: Not on file   Intimate Partner Violence: Not on file   Housing Stability: Not on file       Family History  Family History   Problem Relation Age of Onset     Hyperlipidemia Father       "Hypertension Father      Heart Surgery Father      Anesthesia Reaction Father         delirium after CABG     Breast Cancer Other         aunt     Ovarian Cancer Other      Uterine Cancer Other      Venous thrombosis No family hx of        Review of Systems  The complete review of systems is negative other than noted in the HPI or here.   Anesthesia Evaluation   Pt has had prior anesthetic. Type: General and MAC.    No history of anesthetic complications       ROS/MED HX  ENT/Pulmonary: Comment: Lung nodule    (+) tobacco use, Past use,     Neurologic: Comment: Vertigo    Meniere's disease   (-) no seizures, no CVA and no TIA   Cardiovascular:     (+) hypertension-----Previous cardiac testing   Echo: Date: 5/11/22 Results:    Stress Test: Date: Results:    ECG Reviewed: Date: 3/30/22 Results:  Sinus rhythm  Cath: Date: Results:      METS/Exercise Tolerance: 4 - Raking leaves, gardening    Hematologic:  - neg hematologic  ROS  (-) history of blood transfusion   Musculoskeletal: Comment: Back and neck pain       GI/Hepatic:  - neg GI/hepatic ROS  (-) GERD   Renal/Genitourinary:     (+) renal disease, type: CRI, Pt does not require dialysis,     Endo: Comment: CKD stage 3 - 1.3-1.4    (+) thyroid problem, hypothyroidism,     Psychiatric/Substance Use:     (+) psychiatric history anxiety and depression     Infectious Disease:  - neg infectious disease ROS     Malignancy:   (+) Malignancy, History of Breast.    Other:  - neg other ROS          /86 (BP Location: Right arm, Patient Position: Sitting, Cuff Size: Adult Regular)   Pulse 75   Temp 97.8  F (36.6  C) (Oral)   Resp 16   Ht 1.676 m (5' 6\")   Wt 75.6 kg (166 lb 9.6 oz)   SpO2 98%   Breastfeeding No   BMI 26.89 kg/m      Physical Exam   Constitutional: Awake, alert, cooperative, no apparent distress, and appears stated age.  Eyes: Pupils equal, round and reactive to light, extra ocular muscles intact, sclera clear, conjunctiva normal.  HENT: " Normocephalic, oral pharynx with moist mucus membranes, good dentition. No goiter appreciated.   Respiratory: Clear to auscultation bilaterally, no crackles or wheezing.  Cardiovascular: Regular rate and rhythm, normal S1 and S2, and no murmur noted.  Carotids +2, no bruits. No edema. Palpable pulses to radial  DP and PT arteries.   GI: Normal bowel sounds, soft, non-distended, non-tender, no masses palpated, no hepatosplenomegaly.    Lymph/Hematologic: No cervical lymphadenopathy and no supraclavicular lymphadenopathy.  Genitourinary:  defer  Skin: Warm and dry.  No rashes at anticipated surgical site.   Musculoskeletal: Full ROM of neck - discomfort with movement. There is no redness, warmth, or swelling of the joints. Gross motor strength is normal.    Neurologic: Awake, alert, oriented to name, place and time. Cranial nerves II-XII are grossly intact. Gait is normal.   Neuropsychiatric: Calm, cooperative. Normal affect.     Prior Labs/Diagnostic Studies   All labs and imaging personally reviewed     EKG/ stress test - if available please see in ROS above     Echo result w/o MOPS: Interpretation Summary Left ventricular systolic function is normal.The visual ejection fraction is 55-60%.No regional wall motion abnormalities noted.The right ventricle is normal in structure, function and size.No hemodynamically significant valvular abnormalities on 2D or color flowimaging.          PFT Latest Ref Rng & Units 9/22/2022   FVC L 3.30   FEV1 L 2.30   FVC% % 107   FEV1% % 97         The patient's records and results personally reviewed by this provider.     Outside records reviewed from: Care Everywhere    LAB/DIAGNOSTIC STUDIES TODAY:     Latest Reference Range & Units 03/20/23 07:57   Sodium 136 - 145 mmol/L 129 (L)   Potassium 3.4 - 5.3 mmol/L 4.1   Chloride 98 - 107 mmol/L 92 (L)   Carbon Dioxide (CO2) 22 - 29 mmol/L 27   Urea Nitrogen 8.0 - 23.0 mg/dL 9.4   Creatinine 0.51 - 0.95 mg/dL 1.11 (H)   GFR Estimate >60  "mL/min/1.73m2 54 (L)   Calcium 8.8 - 10.2 mg/dL 9.0   Anion Gap 7 - 15 mmol/L 10   Glucose 70 - 99 mg/dL 110 (H)   WBC 4.0 - 11.0 10e3/uL 6.8   Hemoglobin 11.7 - 15.7 g/dL 12.3   Hematocrit 35.0 - 47.0 % 36.0   Platelet Count 150 - 450 10e3/uL 402   RBC Count 3.80 - 5.20 10e6/uL 3.82   MCV 78 - 100 fL 94   MCH 26.5 - 33.0 pg 32.2   MCHC 31.5 - 36.5 g/dL 34.2   RDW 10.0 - 15.0 % 11.9   Hyponatremia and hypochloremia noted. The patient had normal mentation during our exam today. Call was made to discuss results and recommended eating salty foods and replace water with Gatorade or electrolyte drink. As the patient had no symptoms, according to PAC hyponatremia flow sheet she is okay to proceed with surgery.     Assessment      Sonny Rush is a 67 year old female seen as a PAC referral for risk assessment and optimization for anesthesia.    Plan/Recommendations  Pt will be optimized for the proposed procedure.  See below for details on the assessment, risk, and preoperative recommendations    NEUROLOGY  - Migraines - the patient gets auras. She does feel \"strange\" and nauseated.   ~ Vertigo/ Meniere's disease - patient still has episodes of vertigo and has constant tinnitus   -Post Op delirium risk factors:  No risk identified    ENT  - No current airway concerns.  Will need to be reassessed day of surgery.  Mallampati: III  TM: > 3    CARDIAC  - Hypertension  Well controlled - has had issues in the past but after cardiac work up and seeing cardiology is now well controlled.   - History of palpitations. - Seen by cardiology in 8/2022 and underwent Holter showing 3 episodes of pSVT. She was seen by cardiology on 8/29/22 and doing well.   - METS (Metabolic Equivalents)  Patient performs 4 or more METS exercise without symptoms            Total Score: 0      RCRI-Low risk: Class 2 0.9% complication rate            Total Score: 1    RCRI: High Risk Surgery        PULMONARY  - Obstructive Sleep Apnea  No current " "risk of obstructive sleep apnea   MARLEE Low Risk            Total Score: 2    MARLEE: Hypertension    MARLEE: Over 50 ys old      - Denies asthma or inhaler use   ~ Lung nodules - procedure as above. ERAS  - Tobacco History      History   Smoking Status     Former     Types: Cigarettes     Quit date: 1997   Smokeless Tobacco     Never       GI  PONV High Risk  Total Score: 3           1 AN PONV: Pt is Female    1 AN PONV: Patient is not a current smoker    1 AN PONV: Intended Post Op Opioids        /RENAL  - Baseline Creatinine  1.1-1.4 - CKD stage 3.   ~ History of DCIS - s/p lumpectomy     ENDOCRINE    - BMI: Estimated body mass index is 26.89 kg/m  as calculated from the following:    Height as of this encounter: 1.676 m (5' 6\").    Weight as of this encounter: 75.6 kg (166 lb 9.6 oz).  Overweight (BMI 25.0-29.9)  - Thyroid disorder  Continue home replacement while hospitalized.  ~ Osteopenia - received reclast infusion on 3/13/23    HEME  VTE Low Risk 0.26%            Total Score: 1    VTE: Greater than 59 yrs old      - No history of abnormal bleeding or antiplatelet use.      PSYCH  - Anxiety/ depression - continue medications.     MSK  ~ Low back and neck pain - continue zanaflex and tylenol. Consideration for careful positioning to minimize discomfort.     Different anesthesia methods/types have been discussed with the patient, but they are aware that the final plan will be decided by the assigned anesthesia provider on the date of service.    The patient is optimized for their procedure. AVS with information on surgery time/arrival time, meds and NPO status given by nursing staff. No further diagnostic testing indicated.      On the day of service:     Prep time: 12 minutes  Visit time: 13 minutes  Documentation time: 5 minutes  ------------------------------------------  Total time: 30 minutes      Cristal Dean PA-C  Preoperative Assessment Center  [facility]  Clinic and Surgery " [place]  Phone: [phone]  Fax: [phone]

## 2023-03-21 DIAGNOSIS — R91.1 LUNG NODULE: Primary | ICD-10-CM

## 2023-03-22 ENCOUNTER — LAB (OUTPATIENT)
Dept: LAB | Facility: CLINIC | Age: 68
End: 2023-03-22
Payer: COMMERCIAL

## 2023-03-22 DIAGNOSIS — R91.1 LUNG NODULE: Primary | ICD-10-CM

## 2023-03-22 LAB
ANION GAP SERPL CALCULATED.3IONS-SCNC: 8 MMOL/L (ref 7–15)
BUN SERPL-MCNC: 15.3 MG/DL (ref 8–23)
CALCIUM SERPL-MCNC: 8.6 MG/DL (ref 8.8–10.2)
CHLORIDE SERPL-SCNC: 91 MMOL/L (ref 98–107)
CREAT SERPL-MCNC: 1.17 MG/DL (ref 0.51–0.95)
DEPRECATED HCO3 PLAS-SCNC: 27 MMOL/L (ref 22–29)
GFR SERPL CREATININE-BSD FRML MDRD: 50 ML/MIN/1.73M2
GLUCOSE SERPL-MCNC: 96 MG/DL (ref 70–99)
POTASSIUM SERPL-SCNC: 4.3 MMOL/L (ref 3.4–5.3)
SODIUM SERPL-SCNC: 126 MMOL/L (ref 136–145)

## 2023-03-22 PROCEDURE — 36415 COLL VENOUS BLD VENIPUNCTURE: CPT

## 2023-03-22 PROCEDURE — 80048 BASIC METABOLIC PNL TOTAL CA: CPT

## 2023-03-23 ENCOUNTER — TELEPHONE (OUTPATIENT)
Dept: SURGERY | Facility: CLINIC | Age: 68
End: 2023-03-23
Payer: COMMERCIAL

## 2023-03-23 DIAGNOSIS — E87.1 HYPONATREMIA: Primary | ICD-10-CM

## 2023-03-23 RX ORDER — ACETAMINOPHEN 500 MG
1000 TABLET ORAL ONCE
Status: CANCELLED | OUTPATIENT
Start: 2023-03-23 | End: 2023-03-23

## 2023-03-23 ASSESSMENT — LIFESTYLE VARIABLES: TOBACCO_USE: 1

## 2023-03-23 ASSESSMENT — ENCOUNTER SYMPTOMS: SEIZURES: 0

## 2023-03-23 NOTE — TELEPHONE ENCOUNTER
Called pt per Dr. Hays to discuss lab results-sodium trending downward at 126., was 129 on 3/20 when pt was seen in PAC.    Voicemail left for pt with RN[facility] direct callback number.    Plan to discuss per Dr. Hays:  -Do not drink any more water today  -Contact primary care ASAP to manage ongoing hyponatremia  -We are awaiting recommendations to see if we should proceed with surgery tomorrow  -If we proceed, we will recheck BMP again prior to starting surgery    Iris Musa RN BSN  Thoracic Surgery RN Care Coordinator  [phone]

## 2023-03-23 NOTE — TELEPHONE ENCOUNTER
Spoke with Sonny regarding below recommendations. Sonny voiced understanding.    She reports she has been drinking electrolyte beverages since yesterday (Propel and Gatorade). She will not have any plain water from now on.    Discussed that there is a chance surgery could be postponed. If not, we will recheck BMP in the morning. Writer will await further direction from team and call pt back. Pt in agreement.    Iris Musa, RN BSN  Thoracic Surgery RN Care Coordinator  [phone]

## 2023-03-23 NOTE — TELEPHONE ENCOUNTER
Discussed with pt that team consensus is to proceed with lung surgery as planned tomorrow.    Discussed avoiding plain water and sipping electrolyte beverages, encouraged salty snacks.    Will recheck BMP prior to surgery.    Pt in agreement and had no questions.    Iris Musa, RN BSN  Thoracic Surgery RN Care Coordinator  [phone]

## 2023-03-24 ENCOUNTER — HOSPITAL ENCOUNTER (INPATIENT)
Facility: CLINIC | Age: 68
LOS: 3 days | Discharge: HOME | DRG: 164 | End: 2023-03-27
Attending: STUDENT IN AN ORGANIZED HEALTH CARE EDUCATION/TRAINING PROGRAM | Admitting: STUDENT IN AN ORGANIZED HEALTH CARE EDUCATION/TRAINING PROGRAM
Payer: COMMERCIAL

## 2023-03-24 ENCOUNTER — APPOINTMENT (OUTPATIENT)
Dept: GENERAL RADIOLOGY | Facility: CLINIC | Age: 68
DRG: 164 | End: 2023-03-24
Attending: STUDENT IN AN ORGANIZED HEALTH CARE EDUCATION/TRAINING PROGRAM
Payer: COMMERCIAL

## 2023-03-24 ENCOUNTER — ANESTHESIA (OUTPATIENT)
Dept: SURGERY | Facility: CLINIC | Age: 68
DRG: 164 | End: 2023-03-24
Payer: COMMERCIAL

## 2023-03-24 DIAGNOSIS — R91.1 LUNG NODULE: Primary | ICD-10-CM

## 2023-03-24 LAB
BASE EXCESS BLDA CALC-SCNC: -2.2 MMOL/L (ref -9.6–2)
BASE EXCESS BLDA CALC-SCNC: -6.2 MMOL/L (ref -9.6–2)
BASE EXCESS BLDA CALC-SCNC: -9 MMOL/L (ref -9.6–2)
CA-I BLD-MCNC: 3.9 MG/DL (ref 4.4–5.2)
CA-I BLD-MCNC: 4.5 MG/DL (ref 4.4–5.2)
CA-I BLD-MCNC: 4.5 MG/DL (ref 4.4–5.2)
GLUCOSE BLD-MCNC: 128 MG/DL (ref 70–99)
GLUCOSE BLD-MCNC: 166 MG/DL (ref 70–99)
GLUCOSE BLD-MCNC: 187 MG/DL (ref 70–99)
GLUCOSE BLDC GLUCOMTR-MCNC: 68 MG/DL (ref 70–99)
GLUCOSE BLDC GLUCOMTR-MCNC: 70 MG/DL (ref 70–99)
HCO3 BLDA-SCNC: 18 MMOL/L (ref 21–28)
HCO3 BLDA-SCNC: 22 MMOL/L (ref 21–28)
HCO3 BLDA-SCNC: 24 MMOL/L (ref 21–28)
HGB BLD-MCNC: 10.3 G/DL (ref 11.7–15.7)
HGB BLD-MCNC: 11.2 G/DL (ref 11.7–15.7)
HGB BLD-MCNC: 11.4 G/DL (ref 11.7–15.7)
LACTATE BLD-SCNC: 0.4 MMOL/L
LACTATE BLD-SCNC: 0.5 MMOL/L
LACTATE BLD-SCNC: 0.6 MMOL/L
O2/TOTAL GAS SETTING VFR VENT: 68 %
O2/TOTAL GAS SETTING VFR VENT: 69 %
O2/TOTAL GAS SETTING VFR VENT: 90 %
PCO2 BLDA: 42 MM HG (ref 35–45)
PCO2 BLDA: 47 MM HG (ref 35–45)
PCO2 BLDA: 51 MM HG (ref 35–45)
PH BLDA: 7.23 [PH] (ref 7.35–7.45)
PH BLDA: 7.24 [PH] (ref 7.35–7.45)
PH BLDA: 7.32 [PH] (ref 7.35–7.45)
PLATELET # BLD AUTO: 321 10E3/UL (ref 150–450)
PO2 BLDA: 111 MM HG (ref 80–105)
PO2 BLDA: 156 MM HG (ref 80–105)
PO2 BLDA: 169 MM HG (ref 80–105)
POTASSIUM BLD-SCNC: 3.5 MMOL/L (ref 3.5–5)
POTASSIUM BLD-SCNC: 4.3 MMOL/L (ref 3.5–5)
POTASSIUM BLD-SCNC: 4.5 MMOL/L (ref 3.5–5)
SODIUM BLD-SCNC: 134 MMOL/L (ref 133–144)
SODIUM BLD-SCNC: 134 MMOL/L (ref 133–144)
SODIUM BLD-SCNC: 137 MMOL/L (ref 133–144)

## 2023-03-24 PROCEDURE — 250N000013 HC RX MED GY IP 250 OP 250 PS 637: Performed by: PHYSICIAN ASSISTANT

## 2023-03-24 PROCEDURE — 250N000011 HC RX IP 250 OP 636

## 2023-03-24 PROCEDURE — 88307 TISSUE EXAM BY PATHOLOGIST: CPT | Mod: 26 | Performed by: STUDENT IN AN ORGANIZED HEALTH CARE EDUCATION/TRAINING PROGRAM

## 2023-03-24 PROCEDURE — 88305 TISSUE EXAM BY PATHOLOGIST: CPT | Mod: 26 | Performed by: STUDENT IN AN ORGANIZED HEALTH CARE EDUCATION/TRAINING PROGRAM

## 2023-03-24 PROCEDURE — 999N000065 XR CHEST PORT 1 VIEW

## 2023-03-24 PROCEDURE — 250N000011 HC RX IP 250 OP 636: Performed by: PHYSICIAN ASSISTANT

## 2023-03-24 PROCEDURE — 250N000011 HC RX IP 250 OP 636: Performed by: STUDENT IN AN ORGANIZED HEALTH CARE EDUCATION/TRAINING PROGRAM

## 2023-03-24 PROCEDURE — 82803 BLOOD GASES ANY COMBINATION: CPT

## 2023-03-24 PROCEDURE — 8E0W4CZ ROBOTIC ASSISTED PROCEDURE OF TRUNK REGION, PERCUTANEOUS ENDOSCOPIC APPROACH: ICD-10-PCS | Performed by: STUDENT IN AN ORGANIZED HEALTH CARE EDUCATION/TRAINING PROGRAM

## 2023-03-24 PROCEDURE — 07T74ZZ RESECTION OF THORAX LYMPHATIC, PERCUTANEOUS ENDOSCOPIC APPROACH: ICD-10-PCS | Performed by: STUDENT IN AN ORGANIZED HEALTH CARE EDUCATION/TRAINING PROGRAM

## 2023-03-24 PROCEDURE — 88360 TUMOR IMMUNOHISTOCHEM/MANUAL: CPT | Mod: 26 | Performed by: STUDENT IN AN ORGANIZED HEALTH CARE EDUCATION/TRAINING PROGRAM

## 2023-03-24 PROCEDURE — 360N000080 HC SURGERY LEVEL 7, PER MIN: Performed by: STUDENT IN AN ORGANIZED HEALTH CARE EDUCATION/TRAINING PROGRAM

## 2023-03-24 PROCEDURE — 32663 THORACOSCOPY W/LOBECTOMY: CPT | Mod: LT | Performed by: STUDENT IN AN ORGANIZED HEALTH CARE EDUCATION/TRAINING PROGRAM

## 2023-03-24 PROCEDURE — 999N000141 HC STATISTIC PRE-PROCEDURE NURSING ASSESSMENT: Performed by: STUDENT IN AN ORGANIZED HEALTH CARE EDUCATION/TRAINING PROGRAM

## 2023-03-24 PROCEDURE — 272N000001 HC OR GENERAL SUPPLY STERILE: Performed by: STUDENT IN AN ORGANIZED HEALTH CARE EDUCATION/TRAINING PROGRAM

## 2023-03-24 PROCEDURE — 258N000003 HC RX IP 258 OP 636

## 2023-03-24 PROCEDURE — 250N000009 HC RX 250: Performed by: STUDENT IN AN ORGANIZED HEALTH CARE EDUCATION/TRAINING PROGRAM

## 2023-03-24 PROCEDURE — 370N000017 HC ANESTHESIA TECHNICAL FEE, PER MIN: Performed by: STUDENT IN AN ORGANIZED HEALTH CARE EDUCATION/TRAINING PROGRAM

## 2023-03-24 PROCEDURE — 0BTJ4ZZ RESECTION OF LEFT LOWER LUNG LOBE, PERCUTANEOUS ENDOSCOPIC APPROACH: ICD-10-PCS | Performed by: STUDENT IN AN ORGANIZED HEALTH CARE EDUCATION/TRAINING PROGRAM

## 2023-03-24 PROCEDURE — 88332 PATH CONSLTJ SURG EA ADD BLK: CPT | Mod: 26 | Performed by: STUDENT IN AN ORGANIZED HEALTH CARE EDUCATION/TRAINING PROGRAM

## 2023-03-24 PROCEDURE — 250N000011 HC RX IP 250 OP 636: Performed by: ANESTHESIOLOGY

## 2023-03-24 PROCEDURE — 250N000024 HC ISOFLURANE, PER MIN: Performed by: STUDENT IN AN ORGANIZED HEALTH CARE EDUCATION/TRAINING PROGRAM

## 2023-03-24 PROCEDURE — 36415 COLL VENOUS BLD VENIPUNCTURE: CPT | Performed by: STUDENT IN AN ORGANIZED HEALTH CARE EDUCATION/TRAINING PROGRAM

## 2023-03-24 PROCEDURE — 32668 THORACOSCOPY W/W RESECT DIAG: CPT | Mod: LT | Performed by: STUDENT IN AN ORGANIZED HEALTH CARE EDUCATION/TRAINING PROGRAM

## 2023-03-24 PROCEDURE — 0BJ08ZZ INSPECTION OF TRACHEOBRONCHIAL TREE, VIA NATURAL OR ARTIFICIAL OPENING ENDOSCOPIC: ICD-10-PCS | Performed by: STUDENT IN AN ORGANIZED HEALTH CARE EDUCATION/TRAINING PROGRAM

## 2023-03-24 PROCEDURE — 82330 ASSAY OF CALCIUM: CPT

## 2023-03-24 PROCEDURE — 710N000010 HC RECOVERY PHASE 1, LEVEL 2, PER MIN: Performed by: STUDENT IN AN ORGANIZED HEALTH CARE EDUCATION/TRAINING PROGRAM

## 2023-03-24 PROCEDURE — 258N000001 HC RX 258: Performed by: STUDENT IN AN ORGANIZED HEALTH CARE EDUCATION/TRAINING PROGRAM

## 2023-03-24 PROCEDURE — 85049 AUTOMATED PLATELET COUNT: CPT | Performed by: STUDENT IN AN ORGANIZED HEALTH CARE EDUCATION/TRAINING PROGRAM

## 2023-03-24 PROCEDURE — 88331 PATH CONSLTJ SURG 1 BLK 1SPC: CPT | Mod: TC | Performed by: STUDENT IN AN ORGANIZED HEALTH CARE EDUCATION/TRAINING PROGRAM

## 2023-03-24 PROCEDURE — 120N000002 HC R&B MED SURG/OB UMMC

## 2023-03-24 PROCEDURE — 32674 THORACOSCOPY LYMPH NODE EXC: CPT | Mod: GC | Performed by: STUDENT IN AN ORGANIZED HEALTH CARE EDUCATION/TRAINING PROGRAM

## 2023-03-24 PROCEDURE — 88342 IMHCHEM/IMCYTCHM 1ST ANTB: CPT | Mod: 26 | Performed by: STUDENT IN AN ORGANIZED HEALTH CARE EDUCATION/TRAINING PROGRAM

## 2023-03-24 PROCEDURE — 88341 IMHCHEM/IMCYTCHM EA ADD ANTB: CPT | Mod: 26 | Performed by: STUDENT IN AN ORGANIZED HEALTH CARE EDUCATION/TRAINING PROGRAM

## 2023-03-24 PROCEDURE — 250N000009 HC RX 250

## 2023-03-24 PROCEDURE — 71045 X-RAY EXAM CHEST 1 VIEW: CPT | Mod: 26 | Performed by: RADIOLOGY

## 2023-03-24 PROCEDURE — 07B74ZX EXCISION OF THORAX LYMPHATIC, PERCUTANEOUS ENDOSCOPIC APPROACH, DIAGNOSTIC: ICD-10-PCS | Performed by: STUDENT IN AN ORGANIZED HEALTH CARE EDUCATION/TRAINING PROGRAM

## 2023-03-24 PROCEDURE — 88331 PATH CONSLTJ SURG 1 BLK 1SPC: CPT | Mod: 26 | Performed by: STUDENT IN AN ORGANIZED HEALTH CARE EDUCATION/TRAINING PROGRAM

## 2023-03-24 PROCEDURE — 0BBJ8ZX EXCISION OF LEFT LOWER LUNG LOBE, VIA NATURAL OR ARTIFICIAL OPENING ENDOSCOPIC, DIAGNOSTIC: ICD-10-PCS | Performed by: STUDENT IN AN ORGANIZED HEALTH CARE EDUCATION/TRAINING PROGRAM

## 2023-03-24 PROCEDURE — 88309 TISSUE EXAM BY PATHOLOGIST: CPT | Mod: 26 | Performed by: STUDENT IN AN ORGANIZED HEALTH CARE EDUCATION/TRAINING PROGRAM

## 2023-03-24 RX ORDER — AMOXICILLIN 250 MG
1 CAPSULE ORAL 2 TIMES DAILY
Status: DISCONTINUED | OUTPATIENT
Start: 2023-03-24 | End: 2023-03-27 | Stop reason: HOSPADM

## 2023-03-24 RX ORDER — ESMOLOL HYDROCHLORIDE 10 MG/ML
INJECTION INTRAVENOUS PRN
Status: DISCONTINUED | OUTPATIENT
Start: 2023-03-24 | End: 2023-03-24

## 2023-03-24 RX ORDER — CEFAZOLIN SODIUM/WATER 2 G/20 ML
2 SYRINGE (ML) INTRAVENOUS
Status: COMPLETED | OUTPATIENT
Start: 2023-03-24 | End: 2023-03-24

## 2023-03-24 RX ORDER — LIDOCAINE HYDROCHLORIDE 20 MG/ML
INJECTION, SOLUTION INFILTRATION; PERINEURAL PRN
Status: DISCONTINUED | OUTPATIENT
Start: 2023-03-24 | End: 2023-03-24

## 2023-03-24 RX ORDER — PROCHLORPERAZINE MALEATE 5 MG
5 TABLET ORAL EVERY 6 HOURS PRN
Status: DISCONTINUED | OUTPATIENT
Start: 2023-03-24 | End: 2023-03-27 | Stop reason: HOSPADM

## 2023-03-24 RX ORDER — HYDROMORPHONE HCL IN WATER/PF 6 MG/30 ML
0.4 PATIENT CONTROLLED ANALGESIA SYRINGE INTRAVENOUS EVERY 5 MIN PRN
Status: DISCONTINUED | OUTPATIENT
Start: 2023-03-24 | End: 2023-03-24 | Stop reason: HOSPADM

## 2023-03-24 RX ORDER — KETOROLAC TROMETHAMINE 30 MG/ML
15 INJECTION, SOLUTION INTRAMUSCULAR; INTRAVENOUS ONCE
Status: COMPLETED | OUTPATIENT
Start: 2023-03-24 | End: 2023-03-24

## 2023-03-24 RX ORDER — OXYCODONE HYDROCHLORIDE 5 MG/1
5 TABLET ORAL EVERY 4 HOURS PRN
Status: DISCONTINUED | OUTPATIENT
Start: 2023-03-24 | End: 2023-03-25

## 2023-03-24 RX ORDER — HYDROMORPHONE HCL IN WATER/PF 6 MG/30 ML
0.1 PATIENT CONTROLLED ANALGESIA SYRINGE INTRAVENOUS
Status: DISCONTINUED | OUTPATIENT
Start: 2023-03-24 | End: 2023-03-26

## 2023-03-24 RX ORDER — SODIUM CHLORIDE, SODIUM LACTATE, POTASSIUM CHLORIDE, CALCIUM CHLORIDE 600; 310; 30; 20 MG/100ML; MG/100ML; MG/100ML; MG/100ML
INJECTION, SOLUTION INTRAVENOUS CONTINUOUS PRN
Status: DISCONTINUED | OUTPATIENT
Start: 2023-03-24 | End: 2023-03-24

## 2023-03-24 RX ORDER — FENTANYL CITRATE 50 UG/ML
25 INJECTION, SOLUTION INTRAMUSCULAR; INTRAVENOUS EVERY 5 MIN PRN
Status: DISCONTINUED | OUTPATIENT
Start: 2023-03-24 | End: 2023-03-24 | Stop reason: HOSPADM

## 2023-03-24 RX ORDER — FAMOTIDINE 20 MG/1
20 TABLET, FILM COATED ORAL DAILY
Status: DISCONTINUED | OUTPATIENT
Start: 2023-03-24 | End: 2023-03-27 | Stop reason: HOSPADM

## 2023-03-24 RX ORDER — DIPHENHYDRAMINE HCL 25 MG
25 CAPSULE ORAL EVERY 6 HOURS PRN
Status: DISCONTINUED | OUTPATIENT
Start: 2023-03-24 | End: 2023-03-27 | Stop reason: HOSPADM

## 2023-03-24 RX ORDER — HYDROMORPHONE HCL IN WATER/PF 6 MG/30 ML
0.2 PATIENT CONTROLLED ANALGESIA SYRINGE INTRAVENOUS
Status: DISCONTINUED | OUTPATIENT
Start: 2023-03-24 | End: 2023-03-26

## 2023-03-24 RX ORDER — NALOXONE HYDROCHLORIDE 0.4 MG/ML
0.2 INJECTION, SOLUTION INTRAMUSCULAR; INTRAVENOUS; SUBCUTANEOUS
Status: DISCONTINUED | OUTPATIENT
Start: 2023-03-24 | End: 2023-03-27 | Stop reason: HOSPADM

## 2023-03-24 RX ORDER — GABAPENTIN 100 MG/1
100 CAPSULE ORAL
Status: COMPLETED | OUTPATIENT
Start: 2023-03-24 | End: 2023-03-24

## 2023-03-24 RX ORDER — HYDRALAZINE HYDROCHLORIDE 20 MG/ML
2.5-5 INJECTION INTRAMUSCULAR; INTRAVENOUS EVERY 10 MIN PRN
Status: DISCONTINUED | OUTPATIENT
Start: 2023-03-24 | End: 2023-03-24 | Stop reason: HOSPADM

## 2023-03-24 RX ORDER — ONDANSETRON 2 MG/ML
INJECTION INTRAMUSCULAR; INTRAVENOUS PRN
Status: DISCONTINUED | OUTPATIENT
Start: 2023-03-24 | End: 2023-03-24

## 2023-03-24 RX ORDER — HEPARIN SODIUM 5000 [USP'U]/.5ML
5000 INJECTION, SOLUTION INTRAVENOUS; SUBCUTANEOUS EVERY 8 HOURS
Status: DISCONTINUED | OUTPATIENT
Start: 2023-03-25 | End: 2023-03-27 | Stop reason: HOSPADM

## 2023-03-24 RX ORDER — CALCIUM CHLORIDE 100 MG/ML
INJECTION INTRAVENOUS; INTRAVENTRICULAR PRN
Status: DISCONTINUED | OUTPATIENT
Start: 2023-03-24 | End: 2023-03-24

## 2023-03-24 RX ORDER — ACETAMINOPHEN 325 MG/1
975 TABLET ORAL ONCE
Status: COMPLETED | OUTPATIENT
Start: 2023-03-24 | End: 2023-03-24

## 2023-03-24 RX ORDER — EPHEDRINE SULFATE 50 MG/ML
INJECTION, SOLUTION INTRAMUSCULAR; INTRAVENOUS; SUBCUTANEOUS PRN
Status: DISCONTINUED | OUTPATIENT
Start: 2023-03-24 | End: 2023-03-24

## 2023-03-24 RX ORDER — LIDOCAINE 40 MG/G
CREAM TOPICAL
Status: DISCONTINUED | OUTPATIENT
Start: 2023-03-24 | End: 2023-03-24 | Stop reason: HOSPADM

## 2023-03-24 RX ORDER — METHOCARBAMOL 500 MG/1
500 TABLET, FILM COATED ORAL EVERY 6 HOURS PRN
Status: DISCONTINUED | OUTPATIENT
Start: 2023-03-24 | End: 2023-03-27 | Stop reason: HOSPADM

## 2023-03-24 RX ORDER — LEVOTHYROXINE SODIUM 88 UG/1
88 TABLET ORAL DAILY
Status: DISCONTINUED | OUTPATIENT
Start: 2023-03-25 | End: 2023-03-27 | Stop reason: HOSPADM

## 2023-03-24 RX ORDER — NALOXONE HYDROCHLORIDE 0.4 MG/ML
0.4 INJECTION, SOLUTION INTRAMUSCULAR; INTRAVENOUS; SUBCUTANEOUS
Status: DISCONTINUED | OUTPATIENT
Start: 2023-03-24 | End: 2023-03-27 | Stop reason: HOSPADM

## 2023-03-24 RX ORDER — ACETAMINOPHEN 325 MG/1
975 TABLET ORAL EVERY 8 HOURS
Status: DISPENSED | OUTPATIENT
Start: 2023-03-24 | End: 2023-03-27

## 2023-03-24 RX ORDER — CALCIUM CARBONATE 500 MG/1
500 TABLET, CHEWABLE ORAL 4 TIMES DAILY PRN
Status: DISCONTINUED | OUTPATIENT
Start: 2023-03-24 | End: 2023-03-27 | Stop reason: HOSPADM

## 2023-03-24 RX ORDER — ONDANSETRON 2 MG/ML
4 INJECTION INTRAMUSCULAR; INTRAVENOUS EVERY 6 HOURS PRN
Status: DISCONTINUED | OUTPATIENT
Start: 2023-03-24 | End: 2023-03-27 | Stop reason: HOSPADM

## 2023-03-24 RX ORDER — LABETALOL HYDROCHLORIDE 5 MG/ML
10 INJECTION, SOLUTION INTRAVENOUS
Status: DISCONTINUED | OUTPATIENT
Start: 2023-03-24 | End: 2023-03-24 | Stop reason: HOSPADM

## 2023-03-24 RX ORDER — GABAPENTIN 100 MG/1
100 CAPSULE ORAL AT BEDTIME
Status: DISCONTINUED | OUTPATIENT
Start: 2023-03-24 | End: 2023-03-25

## 2023-03-24 RX ORDER — ENOXAPARIN SODIUM 100 MG/ML
40 INJECTION SUBCUTANEOUS
Status: COMPLETED | OUTPATIENT
Start: 2023-03-24 | End: 2023-03-24

## 2023-03-24 RX ORDER — SODIUM CHLORIDE, SODIUM LACTATE, POTASSIUM CHLORIDE, CALCIUM CHLORIDE 600; 310; 30; 20 MG/100ML; MG/100ML; MG/100ML; MG/100ML
INJECTION, SOLUTION INTRAVENOUS CONTINUOUS
Status: DISCONTINUED | OUTPATIENT
Start: 2023-03-24 | End: 2023-03-24 | Stop reason: HOSPADM

## 2023-03-24 RX ORDER — PROPOFOL 10 MG/ML
INJECTION, EMULSION INTRAVENOUS PRN
Status: DISCONTINUED | OUTPATIENT
Start: 2023-03-24 | End: 2023-03-24

## 2023-03-24 RX ORDER — CHLORHEXIDINE GLUCONATE ORAL RINSE 1.2 MG/ML
15 SOLUTION DENTAL ONCE
Status: COMPLETED | OUTPATIENT
Start: 2023-03-24 | End: 2023-03-24

## 2023-03-24 RX ORDER — BUPROPION HYDROCHLORIDE 150 MG/1
150 TABLET ORAL EVERY MORNING
Status: DISCONTINUED | OUTPATIENT
Start: 2023-03-25 | End: 2023-03-27 | Stop reason: HOSPADM

## 2023-03-24 RX ORDER — POLYETHYLENE GLYCOL 3350 17 G/17G
17 POWDER, FOR SOLUTION ORAL DAILY
Status: DISCONTINUED | OUTPATIENT
Start: 2023-03-25 | End: 2023-03-27 | Stop reason: HOSPADM

## 2023-03-24 RX ORDER — ACETAMINOPHEN 500 MG
1000 TABLET ORAL ONCE
Status: DISCONTINUED | OUTPATIENT
Start: 2023-03-24 | End: 2023-03-24 | Stop reason: HOSPADM

## 2023-03-24 RX ORDER — ACETAMINOPHEN 325 MG/1
650 TABLET ORAL EVERY 4 HOURS PRN
Status: DISCONTINUED | OUTPATIENT
Start: 2023-03-27 | End: 2023-03-27 | Stop reason: HOSPADM

## 2023-03-24 RX ORDER — FENTANYL CITRATE 50 UG/ML
50 INJECTION, SOLUTION INTRAMUSCULAR; INTRAVENOUS EVERY 5 MIN PRN
Status: DISCONTINUED | OUTPATIENT
Start: 2023-03-24 | End: 2023-03-24 | Stop reason: HOSPADM

## 2023-03-24 RX ORDER — ONDANSETRON 4 MG/1
4 TABLET, ORALLY DISINTEGRATING ORAL EVERY 6 HOURS PRN
Status: DISCONTINUED | OUTPATIENT
Start: 2023-03-24 | End: 2023-03-27 | Stop reason: HOSPADM

## 2023-03-24 RX ORDER — HYDROMORPHONE HCL IN WATER/PF 6 MG/30 ML
0.2 PATIENT CONTROLLED ANALGESIA SYRINGE INTRAVENOUS EVERY 5 MIN PRN
Status: DISCONTINUED | OUTPATIENT
Start: 2023-03-24 | End: 2023-03-24 | Stop reason: HOSPADM

## 2023-03-24 RX ORDER — BISACODYL 10 MG
10 SUPPOSITORY, RECTAL RECTAL DAILY PRN
Status: DISCONTINUED | OUTPATIENT
Start: 2023-03-24 | End: 2023-03-27 | Stop reason: HOSPADM

## 2023-03-24 RX ORDER — CEFAZOLIN SODIUM/WATER 2 G/20 ML
2 SYRINGE (ML) INTRAVENOUS SEE ADMIN INSTRUCTIONS
Status: DISCONTINUED | OUTPATIENT
Start: 2023-03-24 | End: 2023-03-24 | Stop reason: HOSPADM

## 2023-03-24 RX ORDER — BUPIVACAINE HYDROCHLORIDE 2.5 MG/ML
INJECTION, SOLUTION INFILTRATION; PERINEURAL PRN
Status: DISCONTINUED | OUTPATIENT
Start: 2023-03-24 | End: 2023-03-24 | Stop reason: HOSPADM

## 2023-03-24 RX ORDER — DIPHENHYDRAMINE HYDROCHLORIDE 50 MG/ML
12.5 INJECTION INTRAMUSCULAR; INTRAVENOUS ONCE
Status: COMPLETED | OUTPATIENT
Start: 2023-03-24 | End: 2023-03-24

## 2023-03-24 RX ORDER — BUSPIRONE HYDROCHLORIDE 15 MG/1
15 TABLET ORAL 2 TIMES DAILY
Status: DISCONTINUED | OUTPATIENT
Start: 2023-03-25 | End: 2023-03-27 | Stop reason: HOSPADM

## 2023-03-24 RX ORDER — GINSENG 100 MG
CAPSULE ORAL
Status: DISCONTINUED | OUTPATIENT
Start: 2023-03-24 | End: 2023-03-27 | Stop reason: HOSPADM

## 2023-03-24 RX ORDER — DIMENHYDRINATE 50 MG/ML
25 INJECTION, SOLUTION INTRAMUSCULAR; INTRAVENOUS
Status: DISCONTINUED | OUTPATIENT
Start: 2023-03-24 | End: 2023-03-24 | Stop reason: HOSPADM

## 2023-03-24 RX ORDER — FENTANYL CITRATE 50 UG/ML
INJECTION, SOLUTION INTRAMUSCULAR; INTRAVENOUS PRN
Status: DISCONTINUED | OUTPATIENT
Start: 2023-03-24 | End: 2023-03-24

## 2023-03-24 RX ORDER — MIRTAZAPINE 7.5 MG/1
7.5 TABLET, FILM COATED ORAL
Status: DISCONTINUED | OUTPATIENT
Start: 2023-03-24 | End: 2023-03-27 | Stop reason: HOSPADM

## 2023-03-24 RX ORDER — CELECOXIB 200 MG/1
200 CAPSULE ORAL ONCE
Status: COMPLETED | OUTPATIENT
Start: 2023-03-24 | End: 2023-03-24

## 2023-03-24 RX ORDER — LIDOCAINE 40 MG/G
CREAM TOPICAL
Status: DISCONTINUED | OUTPATIENT
Start: 2023-03-24 | End: 2023-03-27 | Stop reason: HOSPADM

## 2023-03-24 RX ADMIN — PHENYLEPHRINE HYDROCHLORIDE 100 MCG: 10 INJECTION INTRAVENOUS at 08:56

## 2023-03-24 RX ADMIN — ENOXAPARIN SODIUM 40 MG: 40 INJECTION SUBCUTANEOUS at 06:09

## 2023-03-24 RX ADMIN — Medication 10 MG: at 10:40

## 2023-03-24 RX ADMIN — PHENYLEPHRINE HYDROCHLORIDE 100 MCG: 10 INJECTION INTRAVENOUS at 11:42

## 2023-03-24 RX ADMIN — Medication 5 MG: at 10:08

## 2023-03-24 RX ADMIN — HYDROMORPHONE HYDROCHLORIDE 0.4 MG: 0.2 INJECTION, SOLUTION INTRAMUSCULAR; INTRAVENOUS; SUBCUTANEOUS at 13:01

## 2023-03-24 RX ADMIN — ESMOLOL HYDROCHLORIDE 40 MG: 10 INJECTION, SOLUTION INTRAVENOUS at 11:25

## 2023-03-24 RX ADMIN — PHENYLEPHRINE HYDROCHLORIDE 100 MCG: 10 INJECTION INTRAVENOUS at 09:35

## 2023-03-24 RX ADMIN — FENTANYL CITRATE 25 MCG: 50 INJECTION, SOLUTION INTRAMUSCULAR; INTRAVENOUS at 12:26

## 2023-03-24 RX ADMIN — FENTANYL CITRATE 75 MCG: 50 INJECTION, SOLUTION INTRAMUSCULAR; INTRAVENOUS at 07:45

## 2023-03-24 RX ADMIN — HYDROMORPHONE HYDROCHLORIDE 0.4 MG: 0.2 INJECTION, SOLUTION INTRAMUSCULAR; INTRAVENOUS; SUBCUTANEOUS at 12:46

## 2023-03-24 RX ADMIN — PROPOFOL 70 MG: 10 INJECTION, EMULSION INTRAVENOUS at 08:37

## 2023-03-24 RX ADMIN — Medication 20 MG: at 11:09

## 2023-03-24 RX ADMIN — PROPOFOL 50 MG: 10 INJECTION, EMULSION INTRAVENOUS at 07:48

## 2023-03-24 RX ADMIN — KETOROLAC TROMETHAMINE 15 MG: 30 INJECTION, SOLUTION INTRAMUSCULAR; INTRAVENOUS at 13:17

## 2023-03-24 RX ADMIN — Medication 10 MG: at 08:32

## 2023-03-24 RX ADMIN — LIDOCAINE HYDROCHLORIDE 80 MG: 20 INJECTION, SOLUTION INFILTRATION; PERINEURAL at 07:45

## 2023-03-24 RX ADMIN — HYDROMORPHONE HYDROCHLORIDE 0.2 MG: 0.2 INJECTION, SOLUTION INTRAMUSCULAR; INTRAVENOUS; SUBCUTANEOUS at 13:45

## 2023-03-24 RX ADMIN — MIDAZOLAM 1 MG: 1 INJECTION INTRAMUSCULAR; INTRAVENOUS at 07:30

## 2023-03-24 RX ADMIN — GABAPENTIN 100 MG: 100 CAPSULE ORAL at 06:13

## 2023-03-24 RX ADMIN — Medication 40 MG: at 07:45

## 2023-03-24 RX ADMIN — DIPHENHYDRAMINE HYDROCHLORIDE 12.5 MG: 50 INJECTION, SOLUTION INTRAMUSCULAR; INTRAVENOUS at 21:59

## 2023-03-24 RX ADMIN — Medication: at 13:52

## 2023-03-24 RX ADMIN — CHLORHEXIDINE GLUCONATE 15 ML: 1.2 SOLUTION ORAL at 06:10

## 2023-03-24 RX ADMIN — HYDROMORPHONE HYDROCHLORIDE 0.4 MG: 0.2 INJECTION, SOLUTION INTRAMUSCULAR; INTRAVENOUS; SUBCUTANEOUS at 13:29

## 2023-03-24 RX ADMIN — Medication 10 MG: at 10:21

## 2023-03-24 RX ADMIN — SODIUM CHLORIDE, POTASSIUM CHLORIDE, SODIUM LACTATE AND CALCIUM CHLORIDE: 600; 310; 30; 20 INJECTION, SOLUTION INTRAVENOUS at 07:45

## 2023-03-24 RX ADMIN — PROPOFOL 50 MG: 10 INJECTION, EMULSION INTRAVENOUS at 10:04

## 2023-03-24 RX ADMIN — FENTANYL CITRATE 25 MCG: 50 INJECTION, SOLUTION INTRAMUSCULAR; INTRAVENOUS at 12:18

## 2023-03-24 RX ADMIN — ONDANSETRON 4 MG: 2 INJECTION INTRAMUSCULAR; INTRAVENOUS at 11:48

## 2023-03-24 RX ADMIN — Medication 10 MG: at 09:12

## 2023-03-24 RX ADMIN — PROPOFOL 80 MG: 10 INJECTION, EMULSION INTRAVENOUS at 07:45

## 2023-03-24 RX ADMIN — Medication 2 G: at 08:19

## 2023-03-24 RX ADMIN — PHENYLEPHRINE HYDROCHLORIDE 100 MCG: 10 INJECTION INTRAVENOUS at 09:15

## 2023-03-24 RX ADMIN — FENTANYL CITRATE 75 MCG: 50 INJECTION, SOLUTION INTRAMUSCULAR; INTRAVENOUS at 10:03

## 2023-03-24 RX ADMIN — PHENYLEPHRINE HYDROCHLORIDE 100 MCG: 10 INJECTION INTRAVENOUS at 08:55

## 2023-03-24 RX ADMIN — SUGAMMADEX 200 MG: 100 INJECTION, SOLUTION INTRAVENOUS at 11:52

## 2023-03-24 RX ADMIN — PHENYLEPHRINE HYDROCHLORIDE 100 MCG: 10 INJECTION INTRAVENOUS at 09:05

## 2023-03-24 RX ADMIN — FENTANYL CITRATE 50 MCG: 50 INJECTION, SOLUTION INTRAMUSCULAR; INTRAVENOUS at 12:33

## 2023-03-24 RX ADMIN — Medication 5 MG: at 09:05

## 2023-03-24 RX ADMIN — HYDROMORPHONE HYDROCHLORIDE 0.2 MG: 0.2 INJECTION, SOLUTION INTRAMUSCULAR; INTRAVENOUS; SUBCUTANEOUS at 12:52

## 2023-03-24 RX ADMIN — CALCIUM CHLORIDE 1000 MG: 100 INJECTION INTRAVENOUS; INTRAVENTRICULAR at 11:04

## 2023-03-24 RX ADMIN — CELECOXIB 200 MG: 200 CAPSULE ORAL at 06:09

## 2023-03-24 RX ADMIN — PHENYLEPHRINE HYDROCHLORIDE 100 MCG: 10 INJECTION INTRAVENOUS at 10:08

## 2023-03-24 RX ADMIN — HYDROMORPHONE HYDROCHLORIDE 0.4 MG: 0.2 INJECTION, SOLUTION INTRAMUSCULAR; INTRAVENOUS; SUBCUTANEOUS at 14:09

## 2023-03-24 RX ADMIN — HYDROMORPHONE HYDROCHLORIDE 0.5 MG: 1 INJECTION, SOLUTION INTRAMUSCULAR; INTRAVENOUS; SUBCUTANEOUS at 11:23

## 2023-03-24 RX ADMIN — HYDROMORPHONE HYDROCHLORIDE 0.4 MG: 0.2 INJECTION, SOLUTION INTRAMUSCULAR; INTRAVENOUS; SUBCUTANEOUS at 12:40

## 2023-03-24 RX ADMIN — ACETAMINOPHEN 975 MG: 325 TABLET ORAL at 06:09

## 2023-03-24 RX ADMIN — DEXTROSE AND SODIUM CHLORIDE: 5; 450 INJECTION, SOLUTION INTRAVENOUS at 14:03

## 2023-03-24 RX ADMIN — Medication 10 MG: at 09:35

## 2023-03-24 ASSESSMENT — ACTIVITIES OF DAILY LIVING (ADL)
ADLS_ACUITY_SCORE: 18

## 2023-03-24 NOTE — ANESTHESIA PREPROCEDURE EVALUATION
Anesthesia Pre-Procedure Evaluation    Patient: Sonny Rush   MRN: [id number] : 1955        Procedure : Procedure(s):  LOBECTOMY, LUNG, ROBOT-ASSISTED- wedge resection, possible lobectomy          Past Medical History:   Diagnosis Date     Anxiety      CKD (chronic kidney disease) stage 3, GFR 30-59 ml/min (H)      DCIS (ductal carcinoma in situ)      HTN (hypertension)      Hypothyroidism      Malignant neoplasm (H)      Meniere's disease      Migraines     ON NEURONTIN, sees neurologist     Mild depression     sees psych     Osteopenia      Palpitations      PMB (postmenopausal bleeding) 2006    Had  D & C  showing inactive endometrium     Pulmonary nodules       Past Surgical History:   Procedure Laterality Date     BIOPSY BREAST SEED LOCALIZATION  7/3/2012    Procedure: BIOPSY BREAST SEED LOCALIZATION;  RIGHT BREAST LUMPECTOMY WITH ULTRASOUND SEED LOCALIZATION;  Surgeon: Jaclyn Dalal MD;  Location:  [facility]     BREAST BIOPSY, RT/LT  00    stereotactic bx right breast--fibrocystic change     COLONOSCOPY N/A 2017    Procedure: COMBINED COLONOSCOPY, SINGLE OR MULTIPLE BIOPSY/POLYPECTOMY BY BIOPSY;  Surgeon: Muriel Wolff MD;  Location:  GI     DILATION AND CURETTAGE  06    inactive endometrium on path; done for PMB     ENT SURGERY      ear tubes, rhinoplasty     LAPAROSCOPY      Age 27 for pelvic pain     ORTHOPEDIC SURGERY      hammertoe      Allergies   Allergen Reactions     Codeine Sulfate      Cough syrup  Hyperactive, anxiety     Depakote      Pill form caused GI disturbance.      Niacin Itching     Prednisone Anxiety      Social History     Tobacco Use     Smoking status: Former     Types: Cigarettes     Quit date:      Years since quittin.2     Smokeless tobacco: Never     Tobacco comments:     Smoked socially    Substance Use Topics     Alcohol use: Yes     Comment: occasional      Wt Readings from Last 1 Encounters:   23 75.6 kg  (166 lb 9.6 oz)        Anesthesia Evaluation   Pt has had prior anesthetic. Type: General and MAC.    No history of anesthetic complications       ROS/MED HX  ENT/Pulmonary: Comment: Lung nodule    (+) tobacco use, Past use,     Neurologic: Comment: Vertigo    Meniere's disease   (-) no seizures, no CVA and no TIA   Cardiovascular:     (+) hypertension-----Previous cardiac testing   Echo: Date: 5/11/22 Results:    Stress Test: Date: Results:    ECG Reviewed: Date: 3/30/22 Results:  Sinus rhythm  Cath: Date: Results:      METS/Exercise Tolerance: 4 - Raking leaves, gardening    Hematologic:  - neg hematologic  ROS  (-) history of blood transfusion   Musculoskeletal: Comment: Back and neck pain       GI/Hepatic:  - neg GI/hepatic ROS  (-) GERD   Renal/Genitourinary: Comment: CKD stage 3 - baseline Cr 1.3-1.4    (+) renal disease, type: CRI, Pt does not require dialysis,     Endo: Comment: Hyponatremia, attributed to a combination of SIADH and polydipsia.     (+) thyroid problem, hypothyroidism,     Psychiatric/Substance Use:     (+) psychiatric history anxiety and depression     Infectious Disease:  - neg infectious disease ROS     Malignancy:   (+) Malignancy, History of Breast.    Other:  - neg other ROS          Physical Exam    Airway        Mallampati: III   TM distance: > 3 FB   Neck ROM: full   Mouth opening: < 3 cm    Respiratory Devices and Support         Dental           Cardiovascular          Rhythm and rate: regular and normal     Pulmonary           breath sounds clear to auscultation           OUTSIDE LABS:  CBC:   Lab Results   Component Value Date    WBC 6.8 03/20/2023    WBC 6.1 10/05/2022    HGB 12.3 03/20/2023    HGB 12.5 10/05/2022    HCT 36.0 03/20/2023    HCT 36.4 10/05/2022     03/20/2023     10/05/2022     BMP:   Lab Results   Component Value Date     (L) 03/22/2023     (L) 03/20/2023    POTASSIUM 4.3 03/22/2023    POTASSIUM 4.1 03/20/2023    CHLORIDE 91 (L)  03/22/2023    CHLORIDE 92 (L) 03/20/2023    CO2 27 03/22/2023    CO2 27 03/20/2023    BUN 15.3 03/22/2023    BUN 9.4 03/20/2023    CR 1.17 (H) 03/22/2023    CR 1.11 (H) 03/20/2023    GLC 96 03/22/2023     (H) 03/20/2023     COAGS:   Lab Results   Component Value Date    INR 1.03 10/05/2022     POC:   Lab Results   Component Value Date    HCG Negative 02/27/2006     HEPATIC:   Lab Results   Component Value Date    ALBUMIN 4.5 03/13/2023    PROTTOTAL 7.5 02/07/2007    ALT 33 02/07/2007    AST 29 02/07/2007    ALKPHOS 88 02/07/2007    BILITOTAL 0.4 02/07/2007     OTHER:   Lab Results   Component Value Date    DARRICK 8.6 (L) 03/22/2023    SED 4 03/31/2005       Anesthesia Plan    ASA Status:  3   NPO Status:  NPO Appropriate    Anesthesia Type: General.     - Airway: ETT   Induction: Intravenous.   Maintenance: Inhalation.   Techniques and Equipment:     - Airway: Video-Laryngoscope, Double lumen ETT     - Lines/Monitors: 2nd IV, Arterial Line     Consents    Anesthesia Plan(s) and associated risks, benefits, and realistic alternatives discussed. Questions answered and patient/representative(s) expressed understanding.    - Discussed:     - Discussed with:  Patient      - Extended Intubation/Ventilatory Support Discussed: No.      - Patient is DNR/DNI Status: No    Use of blood products discussed: Yes.     - Discussed with: Patient.     - Consented: consented to blood products            Reason for refusal: other.     Postoperative Care    Pain management: IV analgesics, Oral pain medications, Multi-modal analgesia.   PONV prophylaxis: Ondansetron (or other 5HT-3), Dexamethasone or Solumedrol     Comments:    Other Comments: I discussed the risks and benefits of general anesthesia with the patient.  Questions were sought and answered.      John Reyes MD  Attending Anesthesiologist            Isaiah Rebollar MD

## 2023-03-24 NOTE — ANESTHESIA POSTPROCEDURE EVALUATION
Patient: Sonny Rush    Procedure: Procedure(s):  LOBECTOMY, LUNG, ROBOT-ASSISTED- wedge resection, COMPLETION LOWER LOBECTOMY, MEDIASTINAL LYMPH NODE DISSECTION       Anesthesia Type:  General    Note:  Disposition: Admission   Postop Pain Control: Uneventful            Sign Out: Well controlled pain   PONV: No   Neuro/Psych: Uneventful            Sign Out: Acceptable/Baseline neuro status   Airway/Respiratory: Uneventful            Sign Out: Acceptable/Baseline resp. status   CV/Hemodynamics: Uneventful            Sign Out: Acceptable CV status; No obvious hypovolemia; No obvious fluid overload   Other NRE: NONE   DID A NON-ROUTINE EVENT OCCUR? No           Last vitals:  Vitals Value Taken Time   /87 03/24/23 1420   Temp     Pulse 76 03/24/23 1429   Resp 29 03/24/23 1429   SpO2 100 % 03/24/23 1429   Vitals shown include unvalidated device data.    Electronically Signed By: Janice Thornton MD  March 24, 2023  2:31 PM

## 2023-03-24 NOTE — PROGRESS NOTES
"/72 (BP Location: Right arm)   Pulse 78   Temp 98.1  F (36.7  C) (Oral)   Resp 18   Ht 1.689 m (5' 6.5\")   Wt 76.2 kg (167 lb 15.9 oz)   SpO2 99%   BMI 26.71 kg/m     Admitted/transferred from: PACU  2 RN skin assessment completed by Trey Kamara RN and June STEVEN RN.  Skin assessment finding: CT site 28F and x 3 lap sites - surgical incisions with derma bond. No other skin issues.   Interventions/actions: Early mobility.    Will continue to monitor.  "

## 2023-03-24 NOTE — PLAN OF CARE
"Goal Outcome Evaluation:    Plan of Care Reviewed With: patient  Overall Patient Progress: improvingOverall Patient Progress: improving  /72 (BP Location: Right arm, Patient Position: Standing, Cuff Size: Adult Regular)   Pulse 79   Temp 98.1  F (36.7  C) (Oral)   Resp 28   Ht 1.689 m (5' 6.5\")   Wt 76.2 kg (167 lb 15.9 oz)   SpO2 98%   BMI 26.71 kg/m       Neuro: Alert and oriented x4.     GI/: Abdomen soft. No BM. Voided x1.     Diet: On clears still not taking much. No c/o nausea. Current diet can be advanced when she takes and tolerates clears.     Incisions/Drains: CT sit and x 3 lap sites. CT to water seal with intermittent air leak.     IV Access: PIV infusing at TKO for PCA.     Labs: Reviewed.     Activity: OOB with assist. Was able to walk to the BR. Voided x1.     Pain: On PCA hydromorphone at 0.2 q 10 minutes with 1.2 mg /hr max.     New changes this shift: Post -op day zero. LLL - lobectomy of today.     Plan: Continue with current POC.          "

## 2023-03-24 NOTE — PHARMACY-ADMISSION MEDICATION HISTORY
Admission Medication History Completed by Pharmacy    See [facility] Admission Navigator for allergy information, preferred outpatient pharmacy, prior to admission medications and immunization status.     Medication History Sources:     Pharmacy completed med history by Venkatesh Whipple PharmD in [facility] on 3/17/23. See his note for further info if needed.    Preop RN documented times of last doses.    Surescripts fill history    Changes made to PTA medication list (reason):    Added: None    Deleted: None    Changed: None    Additional Information:    None    Prior to Admission medications    Medication Sig Last Dose Taking? Auth Provider Long Term End Date   buPROPion (WELLBUTRIN XL) 150 MG 24 hr tablet Take 150 mg by mouth every morning 3/23/2023 Yes Reported, Patient     busPIRone (BUSPAR) 15 MG tablet Take 15 mg by mouth 2 times daily 3/24/2023 Yes Reported, Patient Yes    chlorthalidone (HYGROTON) 25 MG tablet Take 25 mg by mouth daily 3/24/2023 Yes Reported, Patient No    cloNIDine (CATAPRES-TTS1) 0.1 MG/24HR WK patch Place 1 patch onto the skin once a week Changes on Sat morning Past Week Yes Reported, Patient     levothyroxine (SYNTHROID/LEVOTHROID) 88 MCG tablet Take 88 mcg by mouth daily 3/24/2023 Yes Reported, Patient Yes    losartan (COZAAR) 100 MG tablet Take 100 mg by mouth daily 3/23/2023 Yes Reported, Patient No    verapamil ER (VERELAN) 180 MG 24 hr capsule Take 2 capsules (360 mg) by mouth At Bedtime 3/23/2023 Yes Shannon Cain, CNP Yes    acetaminophen (TYLENOL) 500 MG tablet Take 500-1,000 mg by mouth every 8 hours as needed for mild pain   Unknown, Entered By History     folic acid (FOLVITE) 1 MG tablet Take 1 mg by mouth every other day  Patient not taking: Reported on 3/17/2023   Reported, Patient     HydrOXYzine Pamoate (VISTARIL PO) Take 25 mg by mouth as needed  Patient not taking: Reported on 2/9/2023   Reported, Patient     mirtazapine (REMERON) 7.5 MG tablet Take 7.5 mg by mouth nightly as  needed (insomnia)   Reported, Patient No    ondansetron (ZOFRAN ODT) 8 MG ODT tab Take 8 mg by mouth every 8 hours as needed for nausea   Unknown, Entered By History     rizatriptan (MAXALT-MLT) 10 MG ODT Take 1 tablet by mouth as needed   Reported, Patient     tiZANidine (ZANAFLEX) 2 MG tablet Take 1-3 tablets by mouth nightly as needed   Reported, Patient     valACYclovir (VALTREX) 1000 mg tablet Take 1,000 mg by mouth 2 times daily as needed (cold sores)   Unknown, Entered By History     Zoledronic Acid (RECLAST IV) Inject 5 mg into the vein once yearly   Unknown, Entered By History         Date completed: 03/24/23    Medication history completed by: Jerrica Hanson [facility]

## 2023-03-24 NOTE — ANESTHESIA CARE TRANSFER NOTE
Patient: Sonny Rush    Procedure: Procedure(s):  LOBECTOMY, LUNG, ROBOT-ASSISTED- wedge resection, COMPLETION LOWER LOBECTOMY, MEDIASTINAL LYMPH NODE DISSECTION       Diagnosis: Lung nodule [R91.1]  Diagnosis Additional Information: No value filed.    Anesthesia Type:   General     Note:    Oropharynx: oropharynx clear of all foreign objects and spontaneously breathing  Level of Consciousness: awake  Oxygen Supplementation: face mask  Level of Supplemental Oxygen (L/min / FiO2): 6  Independent Airway: airway patency satisfactory and stable  Dentition: dentition unchanged  Vital Signs Stable: post-procedure vital signs reviewed and stable  Report to RN Given: handoff report given  Patient transferred to: PACU  Comments: Woke up confused. Asking where she is in PACU. Reorientated. Not agitated.  Handoff Report: Identifed the Patient, Identified the Reponsible Provider, Reviewed the pertinent medical history, Discussed the surgical course, Reviewed Intra-OP anesthesia mangement and issues during anesthesia, Set expectations for post-procedure period and Allowed opportunity for questions and acknowledgement of understanding      Vitals:  Vitals Value Taken Time   /76 03/24/23 1203   Temp     Pulse 80 03/24/23 1208   Resp 13 03/24/23 1208   SpO2 87 % 03/24/23 1208   Vitals shown include unvalidated device data.    Electronically Signed By: Isaiah Rebollar MD  March 24, 2023  12:09 PM

## 2023-03-24 NOTE — ANESTHESIA PROCEDURE NOTES
Airway       Patient location during procedure: OR  Staff -        Anesthesiologist:  John Reyes MD       Resident/Fellow: Isaiah Rebollar MD       Performed By: resident  Consent for Airway        Urgency: elective  Indications and Patient Condition       Indications for airway management: gasper-procedural       Induction type:intravenous       Mask difficulty assessment: 2 - vent by mask + OA or adjuvant +/- NMBA    Final Airway Details       Final airway type: endotracheal airway       Successful airway: ETT - double lumen left  Endotracheal Airway Details        Cuffed: yes       Successful intubation technique: video laryngoscopy       VL Blade Size: MAC 3       Grade View of Cords: 1       Adjucts: stylet       Position: Center       Measured from: gums/teeth       Secured at (cm): 27       Bite block used: None       ETT Double lumen (fr): 37    Post intubation assessment        Number of attempts at approach: 1       Number of other approaches attempted: 0       Secured with: silk tape       Ease of procedure: easy       Dentition: Intact and Unchanged    Additional Comments       MICHAEL positioning confirmed with bronchoscopy after intubation and after repostitioning.

## 2023-03-24 NOTE — PROGRESS NOTES
S/p LLL lobectomy and mediastinal Lymph node dissection of today. Came up to room at 1500.   CT 28F on the left to water seal. Cannister with 125cc bloody drainage. CT to water seal. No air leak. PCA for pain control. Continue to monitor status.

## 2023-03-24 NOTE — PROVIDER NOTIFICATION
Dr. Tobar paged about Toradol order flagging for Celebrex being taken in the last 11 hours. Awaiting response.

## 2023-03-24 NOTE — BRIEF OP NOTE
[facility]    Brief Operative Note    Pre-operative diagnosis: Lung nodule [R91.1]  Post-operative diagnosis NSCLC    Procedure: Procedure(s):  LOBECTOMY, LUNG, ROBOT-ASSISTED- wedge resection, COMPLETION LOWER LOBECTOMY, MEDIASTINAL LYMPH NODE DISSECTION  Surgeon: Surgeon(s) and Role:     * Tran Hays MD - Primary     * Beny Tobar PA-C   * Josie Stover MD   Anesthesia: General   Estimated Blood Loss: 30 mls    Drains: 28 Fr Straight CT  Specimens:   ID Type Source Tests Collected by Time Destination   1 : left lower lobe wedge frozen section for diagnosis Tissue Lung, Lower Lobe, Left SURGICAL PATHOLOGY EXAM Tran Hays MD 3/24/2023  9:04 AM    2 : 9L Tissue Lymph Node(s) SURGICAL PATHOLOGY EXAM Tran Hays MD 3/24/2023  9:25 AM    3 : Level 7 Tissue Lymph Node(s) SURGICAL PATHOLOGY EXAM Tran Hays MD 3/24/2023  9:32 AM    4 : Level 7 #2 Tissue Lymph Node(s) SURGICAL PATHOLOGY EXAM Tran Hays MD 3/24/2023  9:34 AM    5 : Level 7 #3 Tissue Lymph Node(s) SURGICAL PATHOLOGY EXAM Tran Hays MD 3/24/2023  9:36 AM    6 : 11L Tissue Lymph Node(s) SURGICAL PATHOLOGY EXAM Tran Hays MD 3/24/2023  9:47 AM    7 : 10L Tissue Lymph Node(s) SURGICAL PATHOLOGY EXAM Tran Hays MD 3/24/2023  9:53 AM    8 : Level 5 Tissue Lymph Node(s) SURGICAL PATHOLOGY EXAM Tran Hays MD 3/24/2023  9:57 AM    9 : 11L #2 Tissue Lymph Node(s) SURGICAL PATHOLOGY EXAM Tran Hays MD 3/24/2023 10:23 AM    10 : 11L #3 Tissue Lymph Node(s) SURGICAL PATHOLOGY EXAM Tran Hays MD 3/24/2023 10:33 AM    11 : 11L #4 Tissue Lymph Node(s) SURGICAL PATHOLOGY EXAM Tran Hays MD 3/24/2023 10:48 AM    12 : Completion Left Lower Lobectomy, Vascular and Bronchial Margins Tissue Lung, Lower Lobe, Right SURGICAL PATHOLOGY EXAM Tran Hays MD 3/24/2023 11:26 AM      Findings:   NSCLC on Frozen section, levels 9 and 7 negative on frozen  section  Complications: None  Implants: None

## 2023-03-24 NOTE — PROVIDER NOTIFICATION
Sent page to Dr. Tobar -The xray is done. please let me know if you want the chest tube to suction or not. Also, is she good to go to the floor?

## 2023-03-24 NOTE — ANESTHESIA PROCEDURE NOTES
Arterial Line Procedure Note    Pre-Procedure   Staff -        Anesthesiologist:  John Reyes MD       Resident/Fellow: Isaiah Rebollar MD       Performed By: resident       Location: OR       Pre-Anesthestic Checklist: patient identified, IV checked, risks and benefits discussed, informed consent, monitors and equipment checked, pre-op evaluation and at physician/surgeon's request  Timeout:       Correct Patient: Yes        Correct Procedure: Yes        Correct Site: Yes        Correct Position: Yes   Line Placement:   This line was placed Post Induction  Procedure   Procedure: arterial line       Laterality: right       Insertion Site: radial.  Sterile Prep        Standard elements of sterile barrier followed       Skin prep: Chloraprep  Insertion/Injection        Technique: Seldinger Technique        Catheter Type/Size: 20 G, 12 cm  Narrative        Tegaderm dressing used.       Complications: None apparent,        Arterial waveform: Yes        IBP within 10% of NIBP: Yes

## 2023-03-25 ENCOUNTER — APPOINTMENT (OUTPATIENT)
Dept: GENERAL RADIOLOGY | Facility: CLINIC | Age: 68
DRG: 164 | End: 2023-03-25
Attending: STUDENT IN AN ORGANIZED HEALTH CARE EDUCATION/TRAINING PROGRAM
Payer: COMMERCIAL

## 2023-03-25 LAB
ANION GAP SERPL CALCULATED.3IONS-SCNC: 13 MMOL/L (ref 7–15)
BUN SERPL-MCNC: 17 MG/DL (ref 8–23)
CALCIUM SERPL-MCNC: 8.3 MG/DL (ref 8.8–10.2)
CHLORIDE SERPL-SCNC: 100 MMOL/L (ref 98–107)
CREAT SERPL-MCNC: 0.98 MG/DL (ref 0.51–0.95)
DEPRECATED HCO3 PLAS-SCNC: 20 MMOL/L (ref 22–29)
ERYTHROCYTE [DISTWIDTH] IN BLOOD BY AUTOMATED COUNT: 12.6 % (ref 10–15)
GFR SERPL CREATININE-BSD FRML MDRD: 62 ML/MIN/1.73M2
GLUCOSE BLDC GLUCOMTR-MCNC: 124 MG/DL (ref 70–99)
GLUCOSE SERPL-MCNC: 117 MG/DL (ref 70–99)
HCT VFR BLD AUTO: 29.4 % (ref 35–47)
HGB BLD-MCNC: 9.8 G/DL (ref 11.7–15.7)
MCH RBC QN AUTO: 32.3 PG (ref 26.5–33)
MCHC RBC AUTO-ENTMCNC: 33.3 G/DL (ref 31.5–36.5)
MCV RBC AUTO: 97 FL (ref 78–100)
PLATELET # BLD AUTO: 307 10E3/UL (ref 150–450)
POTASSIUM SERPL-SCNC: 4.1 MMOL/L (ref 3.4–5.3)
RBC # BLD AUTO: 3.03 10E6/UL (ref 3.8–5.2)
SODIUM SERPL-SCNC: 133 MMOL/L (ref 136–145)
WBC # BLD AUTO: 9 10E3/UL (ref 4–11)

## 2023-03-25 PROCEDURE — 85014 HEMATOCRIT: CPT

## 2023-03-25 PROCEDURE — 250N000013 HC RX MED GY IP 250 OP 250 PS 637

## 2023-03-25 PROCEDURE — 250N000013 HC RX MED GY IP 250 OP 250 PS 637: Performed by: STUDENT IN AN ORGANIZED HEALTH CARE EDUCATION/TRAINING PROGRAM

## 2023-03-25 PROCEDURE — 250N000011 HC RX IP 250 OP 636: Performed by: STUDENT IN AN ORGANIZED HEALTH CARE EDUCATION/TRAINING PROGRAM

## 2023-03-25 PROCEDURE — 258N000001 HC RX 258: Performed by: STUDENT IN AN ORGANIZED HEALTH CARE EDUCATION/TRAINING PROGRAM

## 2023-03-25 PROCEDURE — 71045 X-RAY EXAM CHEST 1 VIEW: CPT

## 2023-03-25 PROCEDURE — 36415 COLL VENOUS BLD VENIPUNCTURE: CPT

## 2023-03-25 PROCEDURE — 80048 BASIC METABOLIC PNL TOTAL CA: CPT

## 2023-03-25 PROCEDURE — 120N000002 HC R&B MED SURG/OB UMMC

## 2023-03-25 PROCEDURE — 71045 X-RAY EXAM CHEST 1 VIEW: CPT | Mod: 26 | Performed by: STUDENT IN AN ORGANIZED HEALTH CARE EDUCATION/TRAINING PROGRAM

## 2023-03-25 RX ORDER — GABAPENTIN 250 MG/5ML
100 SOLUTION ORAL AT BEDTIME
Status: DISCONTINUED | OUTPATIENT
Start: 2023-03-25 | End: 2023-03-27 | Stop reason: HOSPADM

## 2023-03-25 RX ORDER — VERAPAMIL HYDROCHLORIDE 180 MG/1
180 TABLET, EXTENDED RELEASE ORAL 2 TIMES DAILY
Status: DISCONTINUED | OUTPATIENT
Start: 2023-03-25 | End: 2023-03-27 | Stop reason: HOSPADM

## 2023-03-25 RX ORDER — VERAPAMIL HYDROCHLORIDE 120 MG/1
360 CAPSULE, EXTENDED RELEASE ORAL AT BEDTIME
Status: DISCONTINUED | OUTPATIENT
Start: 2023-03-25 | End: 2023-03-25

## 2023-03-25 RX ORDER — RIZATRIPTAN BENZOATE 10 MG/1
10 TABLET, ORALLY DISINTEGRATING ORAL
Status: COMPLETED | OUTPATIENT
Start: 2023-03-25 | End: 2023-03-25

## 2023-03-25 RX ORDER — LORAZEPAM 2 MG/ML
0.5 INJECTION INTRAMUSCULAR ONCE
Status: COMPLETED | OUTPATIENT
Start: 2023-03-25 | End: 2023-03-25

## 2023-03-25 RX ORDER — LORAZEPAM 2 MG/ML
1 INJECTION INTRAMUSCULAR
Status: COMPLETED | OUTPATIENT
Start: 2023-03-25 | End: 2023-03-27

## 2023-03-25 RX ORDER — HYDROXYZINE HYDROCHLORIDE 25 MG/1
25 TABLET, FILM COATED ORAL 3 TIMES DAILY PRN
Status: DISCONTINUED | OUTPATIENT
Start: 2023-03-25 | End: 2023-03-27 | Stop reason: HOSPADM

## 2023-03-25 RX ORDER — OXYCODONE HCL 5 MG/5 ML
2.5-5 SOLUTION, ORAL ORAL EVERY 4 HOURS PRN
Status: DISCONTINUED | OUTPATIENT
Start: 2023-03-25 | End: 2023-03-26

## 2023-03-25 RX ORDER — CLONIDINE 0.1 MG/24H
1 PATCH, EXTENDED RELEASE TRANSDERMAL WEEKLY
Status: DISCONTINUED | OUTPATIENT
Start: 2023-03-25 | End: 2023-03-27 | Stop reason: HOSPADM

## 2023-03-25 RX ORDER — LOSARTAN POTASSIUM 100 MG/1
100 TABLET ORAL DAILY
Status: DISCONTINUED | OUTPATIENT
Start: 2023-03-25 | End: 2023-03-27 | Stop reason: HOSPADM

## 2023-03-25 RX ADMIN — ACETAMINOPHEN 975 MG: 325 TABLET ORAL at 15:33

## 2023-03-25 RX ADMIN — LOSARTAN POTASSIUM 100 MG: 100 TABLET, FILM COATED ORAL at 19:53

## 2023-03-25 RX ADMIN — BUSPIRONE HYDROCHLORIDE 15 MG: 15 TABLET ORAL at 19:55

## 2023-03-25 RX ADMIN — HEPARIN SODIUM 5000 UNITS: 5000 INJECTION, SOLUTION INTRAVENOUS; SUBCUTANEOUS at 18:11

## 2023-03-25 RX ADMIN — HYDROMORPHONE HYDROCHLORIDE 0.2 MG: 0.2 INJECTION, SOLUTION INTRAMUSCULAR; INTRAVENOUS; SUBCUTANEOUS at 22:25

## 2023-03-25 RX ADMIN — RIZATRIPTAN BENZOATE 10 MG: 10 TABLET, ORALLY DISINTEGRATING ORAL at 23:33

## 2023-03-25 RX ADMIN — OXYCODONE HYDROCHLORIDE 5 MG: 5 SOLUTION ORAL at 13:13

## 2023-03-25 RX ADMIN — OXYCODONE HYDROCHLORIDE 2.5 MG: 5 TABLET ORAL at 09:01

## 2023-03-25 RX ADMIN — CLONIDINE 1 PATCH: 0.1 PATCH TRANSDERMAL at 12:11

## 2023-03-25 RX ADMIN — LORAZEPAM 0.5 MG: 2 INJECTION INTRAMUSCULAR; INTRAVENOUS at 04:07

## 2023-03-25 RX ADMIN — METHOCARBAMOL 500 MG: 500 TABLET ORAL at 15:03

## 2023-03-25 RX ADMIN — VERAPAMIL HYDROCHLORIDE 180 MG: 180 TABLET, FILM COATED, EXTENDED RELEASE ORAL at 19:52

## 2023-03-25 RX ADMIN — OXYCODONE HYDROCHLORIDE 5 MG: 5 SOLUTION ORAL at 18:10

## 2023-03-25 RX ADMIN — BUPROPION HYDROCHLORIDE 150 MG: 150 TABLET, FILM COATED, EXTENDED RELEASE ORAL at 09:09

## 2023-03-25 RX ADMIN — METHOCARBAMOL 500 MG: 500 TABLET ORAL at 22:21

## 2023-03-25 RX ADMIN — HEPARIN SODIUM 5000 UNITS: 5000 INJECTION, SOLUTION INTRAVENOUS; SUBCUTANEOUS at 08:35

## 2023-03-25 RX ADMIN — DEXTROSE AND SODIUM CHLORIDE: 5; 450 INJECTION, SOLUTION INTRAVENOUS at 21:56

## 2023-03-25 RX ADMIN — SENNOSIDES AND DOCUSATE SODIUM 1 TABLET: 8.6; 5 TABLET ORAL at 19:51

## 2023-03-25 RX ADMIN — Medication 1 LOZENGE: at 12:11

## 2023-03-25 RX ADMIN — HYDROMORPHONE HYDROCHLORIDE 0.2 MG: 0.2 INJECTION, SOLUTION INTRAMUSCULAR; INTRAVENOUS; SUBCUTANEOUS at 19:48

## 2023-03-25 RX ADMIN — LEVOTHYROXINE SODIUM 88 MCG: 88 TABLET ORAL at 09:09

## 2023-03-25 ASSESSMENT — ACTIVITIES OF DAILY LIVING (ADL)
ADLS_ACUITY_SCORE: 18

## 2023-03-25 NOTE — PROGRESS NOTES
THORACIC SURGERY PROGRESS NOTE  March 25, 2023     S: Anxious overnight, and received 0.5 mg Ativan once. This morning, also anxious and tearful. At home she has spells of severe anxiety every couple of months, and Ativan is helpful for those rare times. Reports pain at surgical sites controlled w/ PCA dilaudid. Has had trouble swallowing as if something is in the back of her throat; thus, received no oral pain meds. Also reports transitory itching and took Benadryl once for this. She does accomplish swallowing ice chips.     O:  T 98 F, /62, HR 83, RR 22 SpO2 100% on 2 LPM NC    Left chest tube 125 mL yesterday/ 200 mL midnight to 6AM, serosanguineous, to suction, small air leak with cough    Gen: Adult female alert and interactive, anxious, tearful.  Resp: non labored breathing on 2 LPM NC.  CV: Regular rate  Abd: Soft, nontender, nondistended. No guarding or rebound tenderness.  Ext: warm and well perfused, no edema, SCDs on.    Labs reviewed; Hgb 9.8 Na 133.  CXR reviewed:                                                               IMPRESSION:   1. Left chest tube in place with small left apical pneumothorax. Left  chest wall subcutaneous emphysema  2. Increased bilateral perihilar and bibasilar opacities, atelectasis  and/or pulmonary edema.     A/P: Sonny Rush is a 67 year old female with previous history of CKD stage 3, hx DCIS s/p lumpectomy, HTN, hypothyroidism, Meniere's disease, depression, anxiety. She is now POD#1 s/p robot-assisted completion lower lobectomy and mediastinal lymph node dissection to remove a  left lower lobe cavitary lung nodule on 3/24/2023.    Anxious and remains on PCA dilaudid for post-op pain. Difficulty swallowing pills has prevented oral pain med management but she does appear to be able to swallow with ice. Discussed the patient's anxiety and what has worked well for her at home in the past (rare Ativan for severe anxiety) Chest tube with appropriate  serosanguineous drainage.     - Left chest tube to waterseal  - Daily CXR while chest   - Swallowing difficulty: Throat spray, and change meds to liquid if possible  - Pain: Continue Dilaudid PCA, PRN liquid oxycodone, liquid gabapentin,   - One-time dose 1 mg Ativan PRN for anxiety. Home anti-anxiety meds resumed.  - Encourage IS, walking  - DVT ppx: heparin gtt and SCDs     Seen with Thoracic Fellow Dr. Hernesto Stover and Attending Dr. Cheryl Vogt M.D.    Lola Schwartz M.D.  General Surgery Resident PGY1  [facility]  Thoracic Surgery Service

## 2023-03-25 NOTE — PLAN OF CARE
"Goal Outcome Evaluation:    BP (!) 144/82 (BP Location: Right arm)   Pulse 81   Temp 97.9  F (36.6  C) (Axillary)   Resp 20   Ht 1.689 m (5' 6.5\")   Wt 76.2 kg (167 lb 15.9 oz)   SpO2 98%   BMI 26.71 kg/m      Status: VSS, POD #1 lower lobectomy and mediastinal lymph node dissection   Pain/Nausea: Pain at CT site- PCA Dilaudid in use and PRN oxycodone. Denies N/V  Mobility: Assist x2 with gait belt- ambulated in villanueva  Diet: Clear L- tolerating, some difficulty swallowing (improving)  Labs:   LDAs: R PIV-SL, L PIV- MIVF @30mL/hr + PCA, L CT-WS  Skin/incisions: Intact ex 3 chest lap sites- glued and MEHDI, CT site leaking- dressing reinforced.   Neuro: A&Ox4, CMS intact  Respiratory: RA sating >90%. LLANES, IS encouraged. Unlabored. Congested weak cough  Cardiac: WDL, clonodine patch on R shoulder  GI/: Voiding spontaneously, not saving. +BS and flatus, LBM prior to admission  New Changes: No acute changes this shift  Plan: Continue with POC    "

## 2023-03-25 NOTE — PROGRESS NOTES
"Thoracic Surgery Post-Op Check  03/24/2023    Pt reports 7-8/10 chest pain near surgical site. Feels somewhat SOB on 2 LPM NC. Denies SOB, substernal chest pain, nausea/vomiting, or bloating. Tolerating ice chips. Has gotten out of bed and voided once in the restroom. No flatus yet.    /72 (BP Location: Right arm, Patient Position: Standing, Cuff Size: Adult Regular)   Pulse 79   Temp 98.1  F (36.7  C) (Oral)   Resp 28   Ht 1.689 m (5' 6.5\")   Wt 76.2 kg (167 lb 15.9 oz)   SpO2 98%   BMI 26.71 kg/m     Capnography with EtCO2 35-39.    Urine output x1    Gen: Adult female supine in bed, A&O x4, NAD.  Pulm: CTAB, breathing non-labored on 2 LPM NC.  CV: Regular rate and rhythm.  Chest tube: Left posterolateral chest tube with 210 mL serosanguineous drainage in atrium. Fluid tidaling in tube, to waterseal. Small air leak with coughing and talking.  Abdomen: soft, non-tender, non-distended. No guarding or rebound tenderness.  Extremities: Trace non-pitting edema in BUE and BLE, warm and well perfused.    A/P: Sonny Rush is a 67 year old female with previous history of CKD stage 3, hx DCIS s/p lumpectomy, HTN, hypothyroidism, Meniere's disease, depression, anxiety. She is now POD#0 s/p robot-assisted completion lower lobectomy and mediastinal lymph node dissection to remove a  left lower lobe cavitary lung nodule.     Left chest tube with appropriate volume of serosanguineous drainage, and a small air leak, likely due to a lung parenchymal source during surgery. No acute issues identified on post-op exam.    - Multimodal pain control: Tylenol, gabapentin, PCA Dilaudid, robaxin PRN, 2.5-5 mg oxycodone PRN  - Chest tube to water seal, record output  - Daily chest X-rays while chest tube in place  - A.M. labs  - Strict I&Os  - Advance diet as tolerated (currently on CLD)  - DVT ppx: heparin gtt and SCDs    Please page Surgery On-Call with any questions.    Lola Schwartz M.D.  General Surgery " Resident PGY1  [facility]  Thoracic Surgery Service

## 2023-03-25 NOTE — PROVIDER NOTIFICATION
"Writer geovanni MCDONALD \"Victor Manuel, P [id number] Pt unable to swallow any oral medications d/t difficulty swallowing. Pt requesting IV benadryl, can you order this? thanks! Elizabeth hylton\"  "

## 2023-03-25 NOTE — PLAN OF CARE
19:30 - 23:30    Post-op day 0 for LLL lobectomy.  Afebrile, VSS on 2L NC.  Alert and oriented x4.  Denies pain and nausea.  Benadryl given x 1 for generalized itching.  CT to water seal with intermittent air leak.  Lap sites x3.  PIV infusing TKO and PCA.  On PCA hydromorphone at 0.2 q 10 minutes with 1.2 mg /hr max.  Complaint of difficulty swallowing, provider notified.  Pt. Not up OOB during my shift.  Continue plan of care.

## 2023-03-25 NOTE — PLAN OF CARE
"Neuro:A&O x4 able to make needs known   Respiratory: WDL on 2L oxygen, O2 96  Cardiac:WDL denies cardiac chest pain  Diet:clear  GI/:no urine output, soft abdomen, NO BM  Incision/Drains:CT tube to water seal.   IV Access: PIV infusing dextrose in 5% and 0.45% at 30ml/hr  and PCA morphine  Pain:On PCA hydromorphone 0.2q 10min 1.2 mg/hr max  Labs:reviewed  VS:/59 (BP Location: Right arm)   Pulse 76   Temp 98.7  F (37.1  C) (Oral)   Resp 18   Ht 1.689 m (5' 6.5\")   Wt 76.2 kg (167 lb 15.9 oz)   SpO2 96%   BMI 26.71 kg/m    Activity:not oob  New Changes this shift:none  Plan: continue POC  "

## 2023-03-26 ENCOUNTER — APPOINTMENT (OUTPATIENT)
Dept: GENERAL RADIOLOGY | Facility: CLINIC | Age: 68
DRG: 164 | End: 2023-03-26
Attending: STUDENT IN AN ORGANIZED HEALTH CARE EDUCATION/TRAINING PROGRAM
Payer: COMMERCIAL

## 2023-03-26 PROCEDURE — 250N000013 HC RX MED GY IP 250 OP 250 PS 637: Performed by: STUDENT IN AN ORGANIZED HEALTH CARE EDUCATION/TRAINING PROGRAM

## 2023-03-26 PROCEDURE — 120N000002 HC R&B MED SURG/OB UMMC

## 2023-03-26 PROCEDURE — 71045 X-RAY EXAM CHEST 1 VIEW: CPT

## 2023-03-26 PROCEDURE — 250N000013 HC RX MED GY IP 250 OP 250 PS 637

## 2023-03-26 PROCEDURE — 250N000011 HC RX IP 250 OP 636: Performed by: STUDENT IN AN ORGANIZED HEALTH CARE EDUCATION/TRAINING PROGRAM

## 2023-03-26 PROCEDURE — 71045 X-RAY EXAM CHEST 1 VIEW: CPT | Mod: 26 | Performed by: RADIOLOGY

## 2023-03-26 RX ORDER — CHLORTHALIDONE 25 MG/1
25 TABLET ORAL DAILY
Status: DISCONTINUED | OUTPATIENT
Start: 2023-03-26 | End: 2023-03-26

## 2023-03-26 RX ORDER — OXYCODONE HYDROCHLORIDE 5 MG/1
5-10 TABLET ORAL EVERY 4 HOURS PRN
Status: DISCONTINUED | OUTPATIENT
Start: 2023-03-26 | End: 2023-03-27 | Stop reason: HOSPADM

## 2023-03-26 RX ORDER — CHLORTHALIDONE 25 MG/1
25 TABLET ORAL DAILY
Status: DISCONTINUED | OUTPATIENT
Start: 2023-03-27 | End: 2023-03-27 | Stop reason: HOSPADM

## 2023-03-26 RX ADMIN — METHOCARBAMOL 500 MG: 500 TABLET ORAL at 22:49

## 2023-03-26 RX ADMIN — HEPARIN SODIUM 5000 UNITS: 5000 INJECTION, SOLUTION INTRAVENOUS; SUBCUTANEOUS at 08:34

## 2023-03-26 RX ADMIN — VERAPAMIL HYDROCHLORIDE 180 MG: 180 TABLET, FILM COATED, EXTENDED RELEASE ORAL at 20:24

## 2023-03-26 RX ADMIN — OXYCODONE HYDROCHLORIDE 5 MG: 5 SOLUTION ORAL at 13:11

## 2023-03-26 RX ADMIN — OXYCODONE HYDROCHLORIDE 5 MG: 5 TABLET ORAL at 22:49

## 2023-03-26 RX ADMIN — BUSPIRONE HYDROCHLORIDE 15 MG: 15 TABLET ORAL at 20:24

## 2023-03-26 RX ADMIN — GABAPENTIN 100 MG: 250 SOLUTION ORAL at 23:31

## 2023-03-26 RX ADMIN — METHOCARBAMOL 500 MG: 500 TABLET ORAL at 17:13

## 2023-03-26 RX ADMIN — HYDROXYZINE HYDROCHLORIDE 25 MG: 25 TABLET, FILM COATED ORAL at 22:49

## 2023-03-26 RX ADMIN — OXYCODONE HYDROCHLORIDE 5 MG: 5 SOLUTION ORAL at 08:24

## 2023-03-26 RX ADMIN — OXYCODONE HYDROCHLORIDE 5 MG: 5 TABLET ORAL at 22:45

## 2023-03-26 RX ADMIN — FAMOTIDINE 20 MG: 20 TABLET ORAL at 08:33

## 2023-03-26 RX ADMIN — ACETAMINOPHEN 975 MG: 325 TABLET ORAL at 09:55

## 2023-03-26 RX ADMIN — HEPARIN SODIUM 5000 UNITS: 5000 INJECTION, SOLUTION INTRAVENOUS; SUBCUTANEOUS at 01:23

## 2023-03-26 RX ADMIN — VERAPAMIL HYDROCHLORIDE 180 MG: 180 TABLET, FILM COATED, EXTENDED RELEASE ORAL at 08:33

## 2023-03-26 RX ADMIN — METHOCARBAMOL 500 MG: 500 TABLET ORAL at 08:24

## 2023-03-26 RX ADMIN — LOSARTAN POTASSIUM 100 MG: 100 TABLET, FILM COATED ORAL at 08:32

## 2023-03-26 RX ADMIN — SENNOSIDES AND DOCUSATE SODIUM 1 TABLET: 8.6; 5 TABLET ORAL at 08:33

## 2023-03-26 RX ADMIN — ONDANSETRON 4 MG: 2 INJECTION INTRAMUSCULAR; INTRAVENOUS at 22:45

## 2023-03-26 RX ADMIN — ACETAMINOPHEN 975 MG: 325 TABLET ORAL at 01:09

## 2023-03-26 RX ADMIN — LEVOTHYROXINE SODIUM 88 MCG: 88 TABLET ORAL at 08:34

## 2023-03-26 RX ADMIN — BUPROPION HYDROCHLORIDE 150 MG: 150 TABLET, FILM COATED, EXTENDED RELEASE ORAL at 08:32

## 2023-03-26 RX ADMIN — HEPARIN SODIUM 5000 UNITS: 5000 INJECTION, SOLUTION INTRAVENOUS; SUBCUTANEOUS at 17:13

## 2023-03-26 RX ADMIN — OXYCODONE HYDROCHLORIDE 5 MG: 5 TABLET ORAL at 17:13

## 2023-03-26 RX ADMIN — HYDROMORPHONE HYDROCHLORIDE 0.2 MG: 0.2 INJECTION, SOLUTION INTRAMUSCULAR; INTRAVENOUS; SUBCUTANEOUS at 10:18

## 2023-03-26 RX ADMIN — ACETAMINOPHEN 975 MG: 325 TABLET ORAL at 18:30

## 2023-03-26 RX ADMIN — GABAPENTIN 100 MG: 250 SOLUTION ORAL at 00:13

## 2023-03-26 RX ADMIN — BUSPIRONE HYDROCHLORIDE 15 MG: 15 TABLET ORAL at 08:33

## 2023-03-26 ASSESSMENT — ACTIVITIES OF DAILY LIVING (ADL)
ADLS_ACUITY_SCORE: 18
ADLS_ACUITY_SCORE: 20
ADLS_ACUITY_SCORE: 21
ADLS_ACUITY_SCORE: 20
ADLS_ACUITY_SCORE: 20
ADLS_ACUITY_SCORE: 21
ADLS_ACUITY_SCORE: 18
ADLS_ACUITY_SCORE: 21
ADLS_ACUITY_SCORE: 18

## 2023-03-26 NOTE — PLAN OF CARE
"Neuro:A&O x4 able to make needs known   Respiratory: WDL on RA O2 96  Cardiac:WDL denies cardiac chest pain  Diet:clear  GI/:AUOP, +BS, passing flatus, NO BM during shift,  Incision/Drains:CT tube to water seal.   IV Access: PIV infusing dextrose in 5% and 0.45% at 30ml/hr  and PCA  hydromorphone  Pain:On PCA hydromorphone 0.2q 10min 1.2 mg/hr max, IV dilaudid given for break through pain  Labs:reviewed  VS:/59 (BP Location: Right arm)   Pulse 76   Temp 98.7  F (37.1  C) (Oral)   Resp 18   Ht 1.689 m (5' 6.5\")   Wt 76.2 kg (167 lb 15.9 oz)   SpO2 96%   BMI 26.71 kg/m    Activity: SBA to bathroom  New Changes this shift:none  Plan: continue POC        "

## 2023-03-26 NOTE — PLAN OF CARE
"Goal Outcome Evaluation:    /65 (BP Location: Right arm)   Pulse 78   Temp 98.3  F (36.8  C) (Oral)   Resp 18   Ht 1.689 m (5' 6.5\")   Wt 76.2 kg (167 lb 15.9 oz)   SpO2 100%   BMI 26.71 kg/m      Status: VSS, POD #2 lower lobectomy and mediastinal lymph node dissection   Pain/Nausea: Pain at CT site- managed with PRN oxycodone and Robaxin. Denies N/V  Mobility: Assist x1 with gait belt- ambulated in villanueva  Diet: Full L- tolerating  Labs: Reviewed  LDAs: R PIV-SL, L PIV- MIVF @30mL/hr, L CT-WS  Skin/incisions: Intact ex 3 chest lap sites- glued and MEHDI, CT site leaking- dressing reinforced.   Neuro: A&Ox4, CMS intact  Respiratory: RA sating >90%. LLANES, IS encouraged. Unlabored. Congested weak cough. Hoarse voice  Cardiac: WDL, clonodine patch on R shoulder  GI/: Voiding spontaneously, not saving. +BS and flatus, LBM prior to admission  New Changes: No acute changes this shift  Plan: Continue with POC  "

## 2023-03-27 ENCOUNTER — APPOINTMENT (OUTPATIENT)
Dept: GENERAL RADIOLOGY | Facility: CLINIC | Age: 68
DRG: 164 | End: 2023-03-27
Attending: STUDENT IN AN ORGANIZED HEALTH CARE EDUCATION/TRAINING PROGRAM
Payer: COMMERCIAL

## 2023-03-27 ENCOUNTER — APPOINTMENT (OUTPATIENT)
Dept: PHYSICAL THERAPY | Facility: CLINIC | Age: 68
DRG: 164 | End: 2023-03-27
Attending: STUDENT IN AN ORGANIZED HEALTH CARE EDUCATION/TRAINING PROGRAM
Payer: COMMERCIAL

## 2023-03-27 VITALS
WEIGHT: 167.99 LBS | HEART RATE: 72 BPM | RESPIRATION RATE: 16 BRPM | SYSTOLIC BLOOD PRESSURE: 124 MMHG | DIASTOLIC BLOOD PRESSURE: 75 MMHG | OXYGEN SATURATION: 99 % | HEIGHT: 67 IN | TEMPERATURE: 97.7 F | BODY MASS INDEX: 26.37 KG/M2

## 2023-03-27 LAB — PLATELET # BLD AUTO: 303 10E3/UL (ref 150–450)

## 2023-03-27 PROCEDURE — 250N000013 HC RX MED GY IP 250 OP 250 PS 637: Performed by: STUDENT IN AN ORGANIZED HEALTH CARE EDUCATION/TRAINING PROGRAM

## 2023-03-27 PROCEDURE — 97530 THERAPEUTIC ACTIVITIES: CPT | Mod: GP

## 2023-03-27 PROCEDURE — 71045 X-RAY EXAM CHEST 1 VIEW: CPT

## 2023-03-27 PROCEDURE — 71045 X-RAY EXAM CHEST 1 VIEW: CPT | Mod: 26 | Performed by: RADIOLOGY

## 2023-03-27 PROCEDURE — 250N000011 HC RX IP 250 OP 636

## 2023-03-27 PROCEDURE — 97161 PT EVAL LOW COMPLEX 20 MIN: CPT | Mod: GP

## 2023-03-27 PROCEDURE — 99223 1ST HOSP IP/OBS HIGH 75: CPT | Mod: AI | Performed by: INTERNAL MEDICINE

## 2023-03-27 PROCEDURE — 250N000013 HC RX MED GY IP 250 OP 250 PS 637

## 2023-03-27 PROCEDURE — 85049 AUTOMATED PLATELET COUNT: CPT | Performed by: STUDENT IN AN ORGANIZED HEALTH CARE EDUCATION/TRAINING PROGRAM

## 2023-03-27 PROCEDURE — 250N000011 HC RX IP 250 OP 636: Performed by: STUDENT IN AN ORGANIZED HEALTH CARE EDUCATION/TRAINING PROGRAM

## 2023-03-27 PROCEDURE — 36415 COLL VENOUS BLD VENIPUNCTURE: CPT | Performed by: STUDENT IN AN ORGANIZED HEALTH CARE EDUCATION/TRAINING PROGRAM

## 2023-03-27 PROCEDURE — 71045 X-RAY EXAM CHEST 1 VIEW: CPT | Mod: 77

## 2023-03-27 PROCEDURE — 258N000001 HC RX 258: Performed by: STUDENT IN AN ORGANIZED HEALTH CARE EDUCATION/TRAINING PROGRAM

## 2023-03-27 PROCEDURE — 97116 GAIT TRAINING THERAPY: CPT | Mod: GP

## 2023-03-27 RX ORDER — ACETAMINOPHEN 325 MG/1
650 TABLET ORAL EVERY 4 HOURS PRN
COMMUNITY
Start: 2023-03-27

## 2023-03-27 RX ORDER — OXYCODONE HYDROCHLORIDE 5 MG/1
5 TABLET ORAL EVERY 6 HOURS PRN
Qty: 40 TABLET | Refills: 0 | Status: SHIPPED | OUTPATIENT
Start: 2023-03-27 | End: 2023-03-27

## 2023-03-27 RX ORDER — ENOXAPARIN SODIUM 100 MG/ML
40 INJECTION SUBCUTANEOUS DAILY
Qty: 2.8 ML | Refills: 0 | Status: SHIPPED | OUTPATIENT
Start: 2023-03-27 | End: 2023-04-03

## 2023-03-27 RX ORDER — METHOCARBAMOL 500 MG/1
500 TABLET, FILM COATED ORAL EVERY 6 HOURS PRN
Qty: 56 TABLET | Refills: 0 | Status: SHIPPED | OUTPATIENT
Start: 2023-03-27 | End: 2023-04-10

## 2023-03-27 RX ORDER — GABAPENTIN 100 MG/1
100 CAPSULE ORAL
Qty: 14 CAPSULE | Refills: 0 | Status: SHIPPED | OUTPATIENT
Start: 2023-03-27 | End: 2023-04-07

## 2023-03-27 RX ORDER — POLYETHYLENE GLYCOL 3350 17 G/17G
17 POWDER, FOR SOLUTION ORAL DAILY
Qty: 510 G | COMMUNITY
Start: 2023-03-27 | End: 2023-04-21

## 2023-03-27 RX ORDER — GABAPENTIN 100 MG/1
100 CAPSULE ORAL AT BEDTIME
Status: DISCONTINUED | OUTPATIENT
Start: 2023-03-27 | End: 2023-03-27

## 2023-03-27 RX ORDER — OXYCODONE HYDROCHLORIDE 5 MG/1
5 TABLET ORAL EVERY 6 HOURS PRN
Qty: 28 TABLET | Refills: 0 | COMMUNITY
Start: 2023-03-27

## 2023-03-27 RX ORDER — AMOXICILLIN 250 MG
1 CAPSULE ORAL 2 TIMES DAILY
COMMUNITY
Start: 2023-03-27 | End: 2023-04-21

## 2023-03-27 RX ADMIN — DEXTROSE AND SODIUM CHLORIDE: 5; 450 INJECTION, SOLUTION INTRAVENOUS at 05:30

## 2023-03-27 RX ADMIN — HEPARIN SODIUM 5000 UNITS: 5000 INJECTION, SOLUTION INTRAVENOUS; SUBCUTANEOUS at 16:52

## 2023-03-27 RX ADMIN — VERAPAMIL HYDROCHLORIDE 180 MG: 180 TABLET, FILM COATED, EXTENDED RELEASE ORAL at 07:44

## 2023-03-27 RX ADMIN — FAMOTIDINE 20 MG: 20 TABLET ORAL at 07:45

## 2023-03-27 RX ADMIN — HEPARIN SODIUM 5000 UNITS: 5000 INJECTION, SOLUTION INTRAVENOUS; SUBCUTANEOUS at 01:49

## 2023-03-27 RX ADMIN — LOSARTAN POTASSIUM 100 MG: 100 TABLET, FILM COATED ORAL at 07:44

## 2023-03-27 RX ADMIN — HEPARIN SODIUM 5000 UNITS: 5000 INJECTION, SOLUTION INTRAVENOUS; SUBCUTANEOUS at 07:45

## 2023-03-27 RX ADMIN — OXYCODONE HYDROCHLORIDE 10 MG: 5 TABLET ORAL at 05:27

## 2023-03-27 RX ADMIN — ACETAMINOPHEN 975 MG: 325 TABLET ORAL at 01:47

## 2023-03-27 RX ADMIN — LEVOTHYROXINE SODIUM 88 MCG: 88 TABLET ORAL at 07:44

## 2023-03-27 RX ADMIN — CHLORTHALIDONE 25 MG: 25 TABLET ORAL at 05:27

## 2023-03-27 RX ADMIN — POLYETHYLENE GLYCOL 3350 17 G: 17 POWDER, FOR SOLUTION ORAL at 07:43

## 2023-03-27 RX ADMIN — ACETAMINOPHEN 650 MG: 325 TABLET ORAL at 11:22

## 2023-03-27 RX ADMIN — METHOCARBAMOL 500 MG: 500 TABLET ORAL at 11:22

## 2023-03-27 RX ADMIN — OXYCODONE HYDROCHLORIDE 10 MG: 5 TABLET ORAL at 15:03

## 2023-03-27 RX ADMIN — BUSPIRONE HYDROCHLORIDE 15 MG: 15 TABLET ORAL at 07:44

## 2023-03-27 RX ADMIN — OXYCODONE HYDROCHLORIDE 10 MG: 5 TABLET ORAL at 08:55

## 2023-03-27 RX ADMIN — BUPROPION HYDROCHLORIDE 150 MG: 150 TABLET, FILM COATED, EXTENDED RELEASE ORAL at 07:44

## 2023-03-27 RX ADMIN — METHOCARBAMOL 500 MG: 500 TABLET ORAL at 05:27

## 2023-03-27 RX ADMIN — LORAZEPAM 1 MG: 2 INJECTION INTRAMUSCULAR; INTRAVENOUS at 01:52

## 2023-03-27 ASSESSMENT — ACTIVITIES OF DAILY LIVING (ADL)
ADLS_ACUITY_SCORE: 23
ADLS_ACUITY_SCORE: 26
ADLS_ACUITY_SCORE: 23

## 2023-03-27 NOTE — PLAN OF CARE
"Goal Outcome Evaluation:    BP (!) 163/91 (BP Location: Right arm)   Pulse 75   Temp 97.8  F (36.6  C) (Oral)   Resp 16   Ht 1.689 m (5' 6.5\")   Wt 76.2 kg (167 lb 15.9 oz)   SpO2 99%   BMI 26.71 kg/m      Status: VSS, POD #3 L lower lobectomy and mediastinal lymph node dissection   Pain/Nausea: Mixed L sided CT site pain w/ pronounced anxiety. Managed with PRN oxycodone x2 + Robaxin x2. PRN atarax x1, w/ one time IV Lorazepam given w/ relief in anxiety. Denies N/V  Mobility: Assist x1 with gait belt- ambulated in villanueva  Diet: Full L- tolerating. Advanced to Regular diet   Labs: Reviewed  LDAs: R PIV-SL, L PIV- MIVF @30mL/hr, L CT-WS  Skin/incisions: Intact ex 3 chest lap sites- glued and MEHDI, CT site leaking- dressing reinforced.   Neuro: A&Ox4, CMS intact  Respiratory: RA sating >90%. LLANES, IS encouraged. Unlabored. Congested weak cough. Hoarse voice  Cardiac: WDL, clonodine patch on R shoulder  GI/: Voiding spontaneously, not saving. +BS and flatus, LBM PTA  New Changes: No acute changes this shift  Plan: Continue with POC  "

## 2023-03-27 NOTE — PROGRESS NOTES
03/27/23 1400   Appointment Info   Signing Clinician's Name / Credentials (PT) Lenore Andrews, PT, DPT   Living Environment   People in Home spouse   Current Living Arrangements house   Home Accessibility stairs to enter home;stairs within home   Number of Stairs, Main Entrance 3   Stair Railings, Main Entrance none   Number of Stairs, Within Home, Primary seven;eight   Stair Railings, Within Home, Primary railing on left side (ascending);railing on right side (ascending)   Transportation Anticipated family or friend will provide   Living Environment Comments Pt lives in a house with spouse; has 3 LAUREN and within home has 7 steps that lead to landing then 8 more steps to access upstair bedroom.   Self-Care   Usual Activity Tolerance good   Current Activity Tolerance moderate   Regular Exercise No   Equipment Currently Used at Home none   Fall history within last six months yes   Number of times patient has fallen within last six months 1   Activity/Exercise/Self-Care Comment IND at baseline with mobility   General Information   Onset of Illness/Injury or Date of Surgery 03/24/23   Referring Physician Tran Hays MD   Patient/Family Therapy Goals Statement (PT) To safely navigate stairs   Pertinent History of Current Problem (include personal factors and/or comorbidities that impact the POC) per EMR:Sonny Rush is a 67 year old female with previous history of CKD stage 3, hx DCIS s/p lumpectomy, HTN, hypothyroidism, Meniere's disease, depression, anxiety. She is now POD#2 s/p robot-assisted completion lower lobectomy and mediastinal lymph node dissection to remove a  left lower lobe cavitary lung nodule on 3/24/2023.   Cognition   Affect/Mental Status (Cognition) WNL   Orientation Status (Cognition) oriented x 4   Follows Commands (Cognition) WNL   Pain Assessment   Patient Currently in Pain Yes, see Vital Sign flowsheet   Integumentary/Edema   Integumentary/Edema no deficits were identifed   Posture     Posture Not impaired   Range of Motion (ROM)   Range of Motion ROM is WFL   Strength (Manual Muscle Testing)   Strength (Manual Muscle Testing) strength is WFL   Bed Mobility   Bed Mobility no deficits identified   Transfers   Transfers no deficits identified   Gait/Stairs (Locomotion)   Kemper Level (Gait) independent   Balance   Balance no deficits were identified   Sensory Examination   Sensory Perception WNL   Coordination   Coordination no deficits were identified   Muscle Tone   Muscle Tone no deficits were identified   Clinical Impression   Criteria for Skilled Therapeutic Intervention Yes, treatment indicated   PT Diagnosis (PT) impaired functional mobility   Influenced by the following impairments weakness, activity tolerance, pain   Functional limitations due to impairments stairs, gait   Clinical Presentation (PT Evaluation Complexity) Stable/Uncomplicated   Clinical Presentation Rationale clinician judgement   Clinical Decision Making (Complexity) low complexity   Planned Therapy Interventions (PT) balance training;gait training;home exercise program;stair training;strengthening;risk factor education;progressive activity/exercise;home program guidelines   Anticipated Equipment Needs at Discharge (PT) other (see comments)  (none)   Risk & Benefits of therapy have been explained evaluation/treatment results reviewed;care plan/treatment goals reviewed;risks/benefits reviewed;current/potential barriers reviewed;participants voiced agreement with care plan;participants included;patient   PT Total Evaluation Time   PT Eval, Low Complexity Minutes (20083) 10   Plan of Care Review   Plan of Care Reviewed With patient   Physical Therapy Goals   PT Frequency One time eval and treatment only   PT Predicted Duration/Target Date for Goal Attainment 03/28/23   PT Goals Stairs;Gait   PT: Gait Independent;Greater than 200 feet;Goal Met   PT: Stairs Supervision/stand-by assist;Greater than 10 stairs;Rail on both  sides;Goal Met   Interventions   Interventions Quick Adds Gait Training;Therapeutic Activity   PT Discharge Planning   PT Plan d/c from therapies   PT Discharge Recommendation (DC Rec) home with assist;home with outpatient physical therapy   PT Rationale for DC Rec Pt is close to baseline in terms of functional mobility; she has a supprotive spouse at home and demonstrates safe stair navigation ability. Pt is limited by activity tolerance and considering the nature of procedure she had may benefit from OP PT focusing on conditioning and aerobic endurance.   PT Brief overview of current status IND   Total Session Time   Total Session Time (sum of timed and untimed services) 10

## 2023-03-27 NOTE — PLAN OF CARE
"Goal Outcome Evaluation:    Plan of Care Reviewed With: patient  Overall Patient Progress: improvingOverall Patient Progress: improving  /65 (BP Location: Right arm)   Pulse 74   Temp 97.7  F (36.5  C) (Oral)   Resp 16   Ht 1.689 m (5' 6.5\")   Wt 76.2 kg (167 lb 15.9 oz)   SpO2 98%   BMI 26.71 kg/m       Neuro: Alert and oriented x4.     GI/: WDL. No BM yet. Passing gas. Voiding in good amounts.     Diet: Regular diet. Ate 75% of her lunch. No C/o nausea.     Incisions/Drains: Old CT site - dressing reinforced. CT removed this am.     IV Access: PIV - sl.     Labs: Reviewed.     Activity: OOB with minimal assist. Walked on unit x1. Walks to the BR independently. Reports she has stairs to get to her home and verbalized concern over walking the stairs. Primary team updated and PT evaluation ordered.     Pain: Robaxin and tylenol x1. Had oxy at 0900 and declined oxy this afternoon.     New changes this shift: CT removed. Post CT removal CXR done.     Plan: Possible discharge home today. PT to eval patient. Continue with current POC.          "

## 2023-03-27 NOTE — PROGRESS NOTES
"THORACIC SURGERY PROGRESS NOTE  March 26, 2023       S: No acute events overnight. Seen on morning rounds with anxiety greatly improved from yesterday. Pain controlled. Patient also able to swallow liquid meds and pills now. Tolerating clear liquid diet.     O:  /65 (BP Location: Right arm)   Pulse 78   Temp 98.3  F (36.8  C) (Oral)   Resp 18   Ht 1.689 m (5' 6.5\")   Wt 76.2 kg (167 lb 15.9 oz)   SpO2 100%   BMI 26.71 kg/m        Left chest tube 550 mL yesterday/ 0 mL midnight to 6AM, serosanguineous, to suction, small air leak with cough.    Gen: Adult female alert and interactive, bright affect, NAD. Voice less hoarse than yesterday.  Resp: non labored breathing on 2 LPM NC. Left chest tube in place as above.  CV: Regular rate  Abd: Soft, nontender, nondistended. No guarding or rebound tenderness.  Ext: warm and well perfused, no edema, SCDs on.    Labs reviewed  CXR reviewed                                                                 A/P: Sonny Rush is a 67 year old female with previous history of CKD stage 3, hx DCIS s/p lumpectomy, HTN, hypothyroidism, Meniere's disease, depression, anxiety. She is now POD#2 s/p robot-assisted completion lower lobectomy and mediastinal lymph node dissection to remove a  left lower lobe cavitary lung nodule on 3/24/2023.    Doing well, and meeting post-op goals of care. Anxiety resolved and swallowing difficulty diminishing. Remains inpatient to transition to oral pain meds. Chest tube to stay in place today given output volumes.     - Left chest tube to waterseal  - Daily CXR while chest tube in place  - Discontinued dilaudid PCA, and at patient request, also discontinued IV dilauid  - Pain: PRN liquid oxycodone, liquid gabapentin  - Home meds resumed  - Advance diet as tolerated  - Encourage IS, walking  - DVT ppx: heparin gtt and SCDs     Seen with Thoracic Fellow Dr. Hernesto Stover and Attending Dr. Cheryl Vogt M.D.    Lola Schwartz, " M.D.  General Surgery Resident PGY1  [facility]  Thoracic Surgery Service

## 2023-03-27 NOTE — PROGRESS NOTES
STAFF ADDENDUM:  I saw and evaluated Ms. Rush and agree with the resident s findings and plan of care   Doing well  Plan for tube removal and possible discharge today  Tran Hays MD

## 2023-03-27 NOTE — PLAN OF CARE
"Goal Outcome Evaluation:      Plan of Care Reviewed With: patient      /75 (BP Location: Right arm, Patient Position: Semi-Call's, Cuff Size: Adult Regular)   Pulse 72   Temp 97.7  F (36.5  C) (Oral)   Resp 16   Ht 1.689 m (5' 6.5\")   Wt 76.2 kg (167 lb 15.9 oz)   SpO2 99%   BMI 26.71 kg/m          Status: VSS, POD #3 L lower lobectomy and mediastinal lymph node dissection   Pain/Nausea: Mixed L sided CT site pain w/ pronounced anxiety. Managed with PRN oxycodone x2 + Robaxin x2. PRN atarax x1, w/ one time IV Lorazepam given w/ relief in anxiety. Denies N/V  Mobility: Assist x1   Diet: Full L-  Regular diet   Labs: Reviewed  Skin/incisions: Intact ex 3 chest lap sites- glued and MEHDI, CT site leaking- dressing reinforced.   Neuro: A&Ox4, CMS intact  Respiratory: RA sating >90%. LLANES, IS encouraged. Unlabored. Congested weak cough. Hoarse voice  Cardiac: WDL, dines cardiac chest pain  GI/: Voiding spontaneously, not saving. +BS and flatus, LBM PTA  New Changes: No acute changes this shift  Plan: Continue with POC            "

## 2023-03-27 NOTE — PROGRESS NOTES
Pt was seen by PT for evaluation.  Per PT she did well with stairs.  Currently resting in bed.  Per PT she is cleared for discharge to home.   Primary team updated.

## 2023-03-27 NOTE — PLAN OF CARE
Physical Therapy Discharge Summary    Reason for therapy discharge:    Discharged to home with outpatient therapy.    Progress towards therapy goal(s). See goals on Care Plan in [facility] electronic health record for goal details.  Goals met    Therapy recommendation(s):    Continued therapy is recommended.  Rationale/Recommendations:  OP PT recommended for activity tolerance and aerobic conditioning.

## 2023-03-27 NOTE — DISCHARGE SUMMARY
Pt. discharged at 5:00PM to home, was accompanied by , and left with personal belongings. Pt. received complete discharge paperwork and medications as filled by discharge pharmacy. Pt. was given times of last dose for all discharge medications in writing on discharge medication sheets. Discharge teaching included  medication, pain management, activity restrictions, dressing changes, and signs and symptoms of infection.  Pt. to follow up with primary Provider in 1-2 weeks. Pt. had no further questions at the time of discharge and no unmet needs were identified.

## 2023-03-27 NOTE — DISCHARGE SUMMARY
NAME: Sonny Rush   MRN: [id number]   : 1955     DATE OF ADMISSION: 3/24/2023     PRE/POSTOPERATIVE DIAGNOSES:   Preoperative diagnosis: Cavitary nodule of left lower lobe of lung  Post-operative diagnosis: Adenocarcinoma      PROCEDURES PERFORMED: LOBECTOMY, LUNG, ROBOT-ASSISTED- wedge resection, COMPLETION LOWER LOBECTOMY, MEDIASTINAL LYMPH NODE DISSECTION    PATHOLOGY RESULTS:    Surgical Pathology Report                         Case: [id number]                                   Authorizing Provider:  Tran Hays MD         Collected:           2023 09:04 AM           Ordering Location:      MAIN OR                 Received:            2023 09:12 AM           Pathologist:           Irina Ruby MD                                                                            Intraop:               Magaly Woo MD      Intraoperative Consultation P    A(1). Lung, Lower Lobe, Left, left lower lobe wedge frozen section for diagnosis:  AFS1:  -ADENOCARCINOMA, less than 1 mm from the margin        B(2). Lymph Node(s), 9L:  BFS1:  -Benign lymph node        C(3). Lymph Node(s), Level 7:  CFS1:  -Benign lymph node        E(5). Lymph Node(s), Level 7 #3:  EFS1:  -Benign lymph node        L(12). Lung, Lower Lobe, Right, Completion Left Lower Lobectomy, Vascular and Bronchial Margins:  L1FS and L2FS:  -Bronchial and vascular margins are benign       CULTURE RESULTS: None    INTRAOPERATIVE COMPLICATIONS: None    POSTOPERATIVE MEDICAL ISSUES: None    DRAINS/TUBES PRESENT AT DISCHARGE: One occlusive dressing on left chest tube removal site    DATE OF DISCHARGE:  2023    HOSPITAL COURSE: Sonny Rush is a 67 year old female who on 3/24/2023 underwent the above-named procedures.  She tolerated the operation well and postoperatively was transferred to the general post-surgical  unit.  The remainder of her course was essentially uncomplicated. During her stay, chest tube had serosanguineous output and a small air leak with cough. The tube was continued due to high output until POD4, and post-discontinuation CXR had possible trace left apical pneumothorax with no new respiratory symptoms. Prior to discharge, her pain was controlled well, she was able to perform ADLs and ambulate independently without difficulty, and had full return of bowel and bladder function. On POD4 March 27, 2023, she was discharged to home with her  in stable condition with former left chest tube site occlusive dressing in place. Physical therapy had evaluated and recommended outpatient PT at discharge for conditioning and aerobic endurance.    DISCHARGE EXAM:   General: Adult female supine in bed, A&O, NAD  Lungs: CTAB, non-labored on room air.  Chest: Left chest tube site with occlusive dressing. Robotic incision sites closed with Dermabond adhesive.  Distal extremities warm, independently mobile.  Neuro: A&O x4.    DISCHARGE INSTRUCTIONS:  Discharge Procedure Orders   X-ray Chest 2 vws*   Standing Status: Future Standing Exp. Date: 04/27/23     Order Specific Question Answer Comments   Priority Routine      Physical Therapy Referral   Standing Status: Future   Referral Priority: Routine: Next available opening Referral Type: Rehab Therapy Physical Therapy   Number of Visits Requested: 1     Reason for your hospital stay   Order Comments: Robotic left lower lobectomy for lung nodule, pathology with adenocarcinoma     Adult [facility] Follow-up and recommended labs and tests   Order Comments: 1. Follow up with your primary care physician, Olga Wang in 1-2 weeks.  2. Follow up with a thoracic surgery Clinical Nurse Specialist in 1 month. You will have a 1-month chest X-ray before your appointment as well.     Follow Up and recommended labs and tests   Order Comments: 1. Follow up with your primary care  physician, Olga Wang in 1-2 weeks.  2. Follow up with a thoracic surgery Clinical Nurse Specialist in 1 month. You will have a 1-month chest X-ray before your appointment as well.     Activity   Order Comments: Activity as tolerated, no strenous activity until seen in follow-up, no lifting greater than 20 pounds for the next 2 weeks.     Order Specific Question Answer Comments   Is discharge order? Yes      Discharge Instructions   Order Comments: THORACIC SURGERY DISCHARGE INSTRUCTIONS    DIET: Regular diet - as prior to admission.     If your plans upon discharge include prolonged periods of sitting (i.e a lengthy car or plane ride), it is highly recommended to get up and walk at least once per hour to help prevent swelling and blood clots.     Leave your chest tube dressing on for 48 hours after chest tube removed. You may get incision wet 2 days after operation. Do not submerge, soak, or scrub incision or swim until seen in follow-up or after two weeks.      Activity as tolerated, no strenous activity until seen in follow-up, no lifting greater than 20 pounds for the next 2 weeks.    Stay hydrated. Take over the counter fiber (metamucil or benefiber) and stool softeners (Miralax, docusate or senna) if becoming constipated with narcotic use.      Call for fever greater than 101.5, chills, increased size of incision, red skin around incision, vision changes, muscle strength changes, sensation changes, shortness of breath, or other concerns.    No driving while taking narcotic pain medication.    Transition to ibuprofen or tylenol/acetaminophen for pain control. Do not take tylenol/acetaminophen and acetaminophen containing narcotic (e.g., percocet or vicodin) at the same time. If you have known ulcer problems, or kidney trouble (elevated creatinine) do not take the ibuprofen.    In emergencies, call 911     For other Questions or Concerns;   A.) During weekday working hours (Monday through Friday 8am to  "4:30pm)   call [phone] and ask to speak to a thoracic surgery nurse (RN or LPN).     NELLA.) At nights (after 4:30pm), on weekends, or if urgent call [phone] and   tell the  \"I would like to page job code [phone], the thoracic surgery   fellow on call, please.\"    1. Follow up with Olga Wang in 1-2 weeks.  2. Follow up with a thoracic surgery Clinical Nurse Specialist in 1 month. You will also have an X-ray in 1 month before that appointment.     Diet   Order Comments: Resume your regular diet.     Order Specific Question Answer Comments   Is discharge order? Yes        DISCHARGE MEDICATIONS:   Discharge Medication List as of 3/27/2023  4:15 PM      START taking these medications    Details   enoxaparin ANTICOAGULANT (LOVENOX) 40 MG/0.4ML syringe Inject 0.4 mLs (40 mg) Subcutaneous daily for 7 days, Disp-2.8 mL, R-0, E-Prescribe      gabapentin (NEURONTIN) 100 MG capsule Take 1 capsule (100 mg) by mouth nightly as needed for neuropathic pain, Disp-14 capsule, R-0, E-Prescribe      methocarbamol (ROBAXIN) 500 MG tablet Take 1 tablet (500 mg) by mouth every 6 hours as needed for muscle spasms, Disp-56 tablet, R-0, E-Prescribe      polyethylene glycol (MIRALAX) 17 GM/Dose powder Take 17 g by mouth daily, Disp-510 g, OTC      senna-docusate (SENOKOT-S/PERICOLACE) 8.6-50 MG tablet Take 1 tablet by mouth 2 times daily, OTC      oxyCODONE (ROXICODONE) 5 MG tablet Take 1 tablet (5 mg) by mouth every 6 hours as needed for severe pain (7-10), Disp-40 tablet, R-0, Local Print         CONTINUE these medications which have CHANGED    Details   acetaminophen (TYLENOL) 325 MG tablet Take 2 tablets (650 mg) by mouth every 4 hours as needed for other (For optimal non-opioid multimodal pain management to improve pain control.), OTC         CONTINUE these medications which have NOT CHANGED    Details   buPROPion (WELLBUTRIN XL) 150 MG 24 hr tablet Take 150 mg by mouth every morning, Historical      busPIRone " (BUSPAR) 15 MG tablet Take 15 mg by mouth 2 times daily, Historical      chlorthalidone (HYGROTON) 25 MG tablet Take 25 mg by mouth daily, Historical      folic acid (FOLVITE) 1 MG tablet Take 1 mg by mouth every other day, Historical      levothyroxine (SYNTHROID/LEVOTHROID) 88 MCG tablet Take 88 mcg by mouth daily, Historical      losartan (COZAAR) 100 MG tablet Take 100 mg by mouth daily, Historical      verapamil ER (VERELAN) 180 MG 24 hr capsule Take 2 capsules (360 mg) by mouth At Bedtime, Disp-180 capsule, R-3, E-Prescribe      cloNIDine (CATAPRES-TTS1) 0.1 MG/24HR WK patch Place 1 patch onto the skin once a week Changes on Sat morning, Historical      HydrOXYzine Pamoate (VISTARIL PO) Take 25 mg by mouth as needed, Historical      mirtazapine (REMERON) 7.5 MG tablet Take 7.5 mg by mouth nightly as needed (insomnia), Historical      ondansetron (ZOFRAN ODT) 8 MG ODT tab Take 8 mg by mouth every 8 hours as needed for nausea, Historical      rizatriptan (MAXALT-MLT) 10 MG ODT Take 1 tablet by mouth as needed, Historical      tiZANidine (ZANAFLEX) 2 MG tablet Take 1-3 tablets by mouth nightly as needed, Historical      valACYclovir (VALTREX) 1000 mg tablet Take 1,000 mg by mouth 2 times daily as needed (cold sores), Historical      Zoledronic Acid (RECLAST IV) Inject 5 mg into the vein once yearly, Historical               Seen with Thoracic Fellow, Dr. Dirk Vigil M.D., and discussed with Thoracic Attending, Dr. Antonia Hays M.D.    Lola Schwartz M.D.  General Surgery Resident PGY1  [facility]

## 2023-03-28 ENCOUNTER — PATIENT OUTREACH (OUTPATIENT)
Dept: CARE COORDINATION | Facility: CLINIC | Age: 68
End: 2023-03-28
Payer: COMMERCIAL

## 2023-03-28 NOTE — PROGRESS NOTES
Clinic Care Coordination Contact  [facility]/Voicemail       Clinical Data: Care Coordinator Outreach  Outreach attempted x 1.  Left message on patient's voicemail with call back information and requested return call.  Plan: Care Coordinator will try to reach patient again in 1-2 business days.    Isabell Sharma [facility]  Clinic Care Coordination  [facility]  [email or web address]  [phone]

## 2023-03-28 NOTE — PROGRESS NOTES
Clinic Care Coordination Contact    [facility] contacted patient to follow up regarding her hospital admission. Patient states she is feeling much better since returning home. Patient feels she is moving around better and has had company visiting. Patient states she is not planning to attend outpatient therapy, but doesn't have any concerns with mobility at this time. Patient's spouse asked about when to change patient's bandage. Encouraged them to call the nurse's line listed in their discharge paperwork. They plan to call this afternoon.     Patient and spouse deny any further social service needs at this time. They will reach out if any needs arise.    Isabell Sharma, [facility]  Clinic Care Coordination  [facility]  [email or web address]  [phone]

## 2023-03-30 ENCOUNTER — NURSE TRIAGE (OUTPATIENT)
Dept: ONCOLOGY | Facility: CLINIC | Age: 68
End: 2023-03-30
Payer: COMMERCIAL

## 2023-03-30 ENCOUNTER — TELEPHONE (OUTPATIENT)
Dept: SURGERY | Facility: CLINIC | Age: 68
End: 2023-03-30
Payer: COMMERCIAL

## 2023-03-30 ENCOUNTER — HOSPITAL ENCOUNTER (OUTPATIENT)
Facility: CLINIC | Age: 68
Setting detail: OBSERVATION
Discharge: HOME | End: 2023-03-31
Attending: EMERGENCY MEDICINE | Admitting: STUDENT IN AN ORGANIZED HEALTH CARE EDUCATION/TRAINING PROGRAM
Payer: COMMERCIAL

## 2023-03-30 ENCOUNTER — APPOINTMENT (OUTPATIENT)
Dept: GENERAL RADIOLOGY | Facility: CLINIC | Age: 68
End: 2023-03-30
Attending: EMERGENCY MEDICINE
Payer: COMMERCIAL

## 2023-03-30 ENCOUNTER — APPOINTMENT (OUTPATIENT)
Dept: CT IMAGING | Facility: CLINIC | Age: 68
End: 2023-03-30
Attending: EMERGENCY MEDICINE
Payer: COMMERCIAL

## 2023-03-30 DIAGNOSIS — Z11.52 ENCOUNTER FOR SCREENING LABORATORY TESTING FOR SEVERE ACUTE RESPIRATORY SYNDROME CORONAVIRUS 2 (SARS-COV-2): ICD-10-CM

## 2023-03-30 DIAGNOSIS — R06.00 DYSPNEA, UNSPECIFIED TYPE: ICD-10-CM

## 2023-03-30 DIAGNOSIS — R91.1 LUNG NODULE: Primary | ICD-10-CM

## 2023-03-30 LAB
ABO/RH(D): NORMAL
ALBUMIN SERPL BCG-MCNC: 3.9 G/DL (ref 3.5–5.2)
ALBUMIN UR-MCNC: NEGATIVE MG/DL
ALP SERPL-CCNC: 104 U/L (ref 35–104)
ALT SERPL W P-5'-P-CCNC: 70 U/L (ref 10–35)
ANION GAP SERPL CALCULATED.3IONS-SCNC: 15 MMOL/L (ref 7–15)
ANTIBODY SCREEN: NEGATIVE
APPEARANCE UR: CLEAR
APTT PPP: 35 SECONDS (ref 22–38)
AST SERPL W P-5'-P-CCNC: 85 U/L (ref 10–35)
ATRIAL RATE - MUSE: 80 BPM
BASE EXCESS BLDV CALC-SCNC: 2.5 MMOL/L (ref -7.7–1.9)
BASOPHILS # BLD AUTO: 0.1 10E3/UL (ref 0–0.2)
BASOPHILS NFR BLD AUTO: 1 %
BILIRUB SERPL-MCNC: 0.2 MG/DL
BILIRUB UR QL STRIP: NEGATIVE
BUN SERPL-MCNC: 19.4 MG/DL (ref 8–23)
CALCIUM SERPL-MCNC: 9.6 MG/DL (ref 8.8–10.2)
CHLORIDE SERPL-SCNC: 95 MMOL/L (ref 98–107)
COLOR UR AUTO: ABNORMAL
CREAT BLD-MCNC: 1.5 MG/DL (ref 0.5–1)
CREAT SERPL-MCNC: 1.35 MG/DL (ref 0.51–0.95)
CRP SERPL-MCNC: 6.86 MG/L
D DIMER PPP FEU-MCNC: 1.64 UG/ML FEU (ref 0–0.5)
DEPRECATED HCO3 PLAS-SCNC: 21 MMOL/L (ref 22–29)
DIASTOLIC BLOOD PRESSURE - MUSE: NORMAL MMHG
EOSINOPHIL # BLD AUTO: 0.5 10E3/UL (ref 0–0.7)
EOSINOPHIL NFR BLD AUTO: 5 %
ERYTHROCYTE [DISTWIDTH] IN BLOOD BY AUTOMATED COUNT: 12.5 % (ref 10–15)
ERYTHROCYTE [SEDIMENTATION RATE] IN BLOOD BY WESTERGREN METHOD: 55 MM/HR (ref 0–30)
GFR SERPL CREATININE-BSD FRML MDRD: 37 ML/MIN/1.73M2
GFR SERPL CREATININE-BSD FRML MDRD: 42 ML/MIN/1.73M2
GLUCOSE BLDC GLUCOMTR-MCNC: 99 MG/DL (ref 70–99)
GLUCOSE SERPL-MCNC: 94 MG/DL (ref 70–99)
GLUCOSE UR STRIP-MCNC: NEGATIVE MG/DL
HCO3 BLDV-SCNC: 24 MMOL/L (ref 21–28)
HCO3 BLDV-SCNC: 26 MMOL/L (ref 21–28)
HCT VFR BLD AUTO: 36.9 % (ref 35–47)
HGB BLD-MCNC: 12 G/DL (ref 11.7–15.7)
HGB UR QL STRIP: NEGATIVE
HOLD SPECIMEN: NORMAL
IMM GRANULOCYTES # BLD: 0.1 10E3/UL
IMM GRANULOCYTES NFR BLD: 1 %
INR PPP: 0.94 (ref 0.85–1.15)
INTERPRETATION ECG - MUSE: NORMAL
INTERPRETATION: NORMAL
KETONES UR STRIP-MCNC: ABNORMAL MG/DL
LAB DIRECTOR COMMENTS: ABNORMAL
LAB DIRECTOR DISCLAIMER: ABNORMAL
LAB DIRECTOR INTERPRETATION: ABNORMAL
LAB DIRECTOR METHODOLOGY: ABNORMAL
LAB DIRECTOR RESULTS: ABNORMAL
LACTATE BLD-SCNC: 1.1 MMOL/L
LACTATE SERPL-SCNC: 1.1 MMOL/L (ref 0.7–2)
LEUKOCYTE ESTERASE UR QL STRIP: NEGATIVE
LYMPHOCYTES # BLD AUTO: 2.1 10E3/UL (ref 0.8–5.3)
LYMPHOCYTES NFR BLD AUTO: 21 %
MAGNESIUM SERPL-MCNC: 2.1 MG/DL (ref 1.7–2.3)
MCH RBC QN AUTO: 31.7 PG (ref 26.5–33)
MCHC RBC AUTO-ENTMCNC: 32.5 G/DL (ref 31.5–36.5)
MCV RBC AUTO: 97 FL (ref 78–100)
MONOCYTES # BLD AUTO: 1.2 10E3/UL (ref 0–1.3)
MONOCYTES NFR BLD AUTO: 12 %
MUCOUS THREADS #/AREA URNS LPF: PRESENT /LPF
NEUTROPHILS # BLD AUTO: 6.1 10E3/UL (ref 1.6–8.3)
NEUTROPHILS NFR BLD AUTO: 60 %
NITRATE UR QL: NEGATIVE
NRBC # BLD AUTO: 0 10E3/UL
NRBC BLD AUTO-RTO: 0 /100
O2/TOTAL GAS SETTING VFR VENT: 21 %
P AXIS - MUSE: 47 DEGREES
PATH REPORT.COMMENTS IMP SPEC: ABNORMAL
PATH REPORT.COMMENTS IMP SPEC: YES
PATH REPORT.FINAL DX SPEC: ABNORMAL
PATH REPORT.GROSS SPEC: ABNORMAL
PATH REPORT.INTRAOP OBS SPEC DOC: ABNORMAL
PATH REPORT.MICROSCOPIC SPEC OTHER STN: ABNORMAL
PATH REPORT.RELEVANT HX SPEC: ABNORMAL
PATHOLOGY SYNOPTIC REPORT: ABNORMAL
PCO2 BLDV: 33 MM HG (ref 40–50)
PCO2 BLDV: 34 MM HG (ref 40–50)
PH BLDV: 7.48 [PH] (ref 7.32–7.43)
PH BLDV: 7.49 [PH] (ref 7.32–7.43)
PH UR STRIP: 6 [PH] (ref 5–7)
PHOTO IMAGE: ABNORMAL
PLATELET # BLD AUTO: 499 10E3/UL (ref 150–450)
PO2 BLDV: 16 MM HG (ref 25–47)
PO2 BLDV: 20 MM HG (ref 25–47)
POTASSIUM SERPL-SCNC: 4.1 MMOL/L (ref 3.4–5.3)
PR INTERVAL - MUSE: 152 MS
PROT SERPL-MCNC: 6.8 G/DL (ref 6.4–8.3)
QRS DURATION - MUSE: 76 MS
QT - MUSE: 358 MS
QTC - MUSE: 412 MS
R AXIS - MUSE: 15 DEGREES
RBC # BLD AUTO: 3.79 10E6/UL (ref 3.8–5.2)
RBC URINE: 1 /HPF
SAO2 % BLDV: 25 % (ref 94–100)
SIGNIFICANT RESULTS: NORMAL
SODIUM SERPL-SCNC: 131 MMOL/L (ref 136–145)
SP GR UR STRIP: 1.01 (ref 1–1.03)
SPECIMEN DESCRIPTION: ABNORMAL
SPECIMEN DESCRIPTION: NORMAL
SPECIMEN EXPIRATION DATE: NORMAL
SYSTOLIC BLOOD PRESSURE - MUSE: NORMAL MMHG
T AXIS - MUSE: 34 DEGREES
TEST DETAILS, MDL: NORMAL
TROPONIN T BLD-MCNC: 0 UG/L
TROPONIN T SERPL HS-MCNC: 7 NG/L
UROBILINOGEN UR STRIP-MCNC: NORMAL MG/DL
VENTRICULAR RATE- MUSE: 80 BPM
WBC # BLD AUTO: 10 10E3/UL (ref 4–11)
WBC URINE: 0 /HPF

## 2023-03-30 PROCEDURE — 81001 URINALYSIS AUTO W/SCOPE: CPT | Performed by: EMERGENCY MEDICINE

## 2023-03-30 PROCEDURE — 86140 C-REACTIVE PROTEIN: CPT | Performed by: EMERGENCY MEDICINE

## 2023-03-30 PROCEDURE — 85652 RBC SED RATE AUTOMATED: CPT | Performed by: EMERGENCY MEDICINE

## 2023-03-30 PROCEDURE — 86850 RBC ANTIBODY SCREEN: CPT | Performed by: EMERGENCY MEDICINE

## 2023-03-30 PROCEDURE — 83605 ASSAY OF LACTIC ACID: CPT | Performed by: EMERGENCY MEDICINE

## 2023-03-30 PROCEDURE — 85025 COMPLETE CBC W/AUTO DIFF WBC: CPT | Performed by: EMERGENCY MEDICINE

## 2023-03-30 PROCEDURE — 84484 ASSAY OF TROPONIN QUANT: CPT | Performed by: EMERGENCY MEDICINE

## 2023-03-30 PROCEDURE — 99285 EMERGENCY DEPT VISIT HI MDM: CPT | Mod: CS | Performed by: EMERGENCY MEDICINE

## 2023-03-30 PROCEDURE — 82803 BLOOD GASES ANY COMBINATION: CPT | Mod: 91 | Performed by: EMERGENCY MEDICINE

## 2023-03-30 PROCEDURE — 82565 ASSAY OF CREATININE: CPT

## 2023-03-30 PROCEDURE — 258N000003 HC RX IP 258 OP 636: Performed by: EMERGENCY MEDICINE

## 2023-03-30 PROCEDURE — 80053 COMPREHEN METABOLIC PANEL: CPT | Performed by: EMERGENCY MEDICINE

## 2023-03-30 PROCEDURE — 83605 ASSAY OF LACTIC ACID: CPT

## 2023-03-30 PROCEDURE — 71045 X-RAY EXAM CHEST 1 VIEW: CPT

## 2023-03-30 PROCEDURE — 83735 ASSAY OF MAGNESIUM: CPT | Performed by: EMERGENCY MEDICINE

## 2023-03-30 PROCEDURE — 36415 COLL VENOUS BLD VENIPUNCTURE: CPT | Performed by: EMERGENCY MEDICINE

## 2023-03-30 PROCEDURE — 250N000011 HC RX IP 250 OP 636: Performed by: STUDENT IN AN ORGANIZED HEALTH CARE EDUCATION/TRAINING PROGRAM

## 2023-03-30 PROCEDURE — 82962 GLUCOSE BLOOD TEST: CPT

## 2023-03-30 PROCEDURE — 99285 EMERGENCY DEPT VISIT HI MDM: CPT | Mod: 25

## 2023-03-30 PROCEDURE — 87040 BLOOD CULTURE FOR BACTERIA: CPT | Performed by: EMERGENCY MEDICINE

## 2023-03-30 PROCEDURE — 93010 ELECTROCARDIOGRAM REPORT: CPT | Performed by: EMERGENCY MEDICINE

## 2023-03-30 PROCEDURE — 71275 CT ANGIOGRAPHY CHEST: CPT

## 2023-03-30 PROCEDURE — 96361 HYDRATE IV INFUSION ADD-ON: CPT

## 2023-03-30 PROCEDURE — 84484 ASSAY OF TROPONIN QUANT: CPT

## 2023-03-30 PROCEDURE — 71275 CT ANGIOGRAPHY CHEST: CPT | Mod: 26 | Performed by: RADIOLOGY

## 2023-03-30 PROCEDURE — 71045 X-RAY EXAM CHEST 1 VIEW: CPT | Mod: 26 | Performed by: RADIOLOGY

## 2023-03-30 PROCEDURE — 85379 FIBRIN DEGRADATION QUANT: CPT | Performed by: EMERGENCY MEDICINE

## 2023-03-30 PROCEDURE — G0452 MOLECULAR PATHOLOGY INTERPR: HCPCS | Mod: 26 | Performed by: STUDENT IN AN ORGANIZED HEALTH CARE EDUCATION/TRAINING PROGRAM

## 2023-03-30 PROCEDURE — 250N000011 HC RX IP 250 OP 636: Performed by: EMERGENCY MEDICINE

## 2023-03-30 PROCEDURE — 250N000013 HC RX MED GY IP 250 OP 250 PS 637: Performed by: EMERGENCY MEDICINE

## 2023-03-30 PROCEDURE — 85730 THROMBOPLASTIN TIME PARTIAL: CPT | Performed by: EMERGENCY MEDICINE

## 2023-03-30 PROCEDURE — 85610 PROTHROMBIN TIME: CPT | Performed by: EMERGENCY MEDICINE

## 2023-03-30 PROCEDURE — 93005 ELECTROCARDIOGRAM TRACING: CPT

## 2023-03-30 PROCEDURE — 96374 THER/PROPH/DIAG INJ IV PUSH: CPT | Mod: 59

## 2023-03-30 RX ORDER — LORAZEPAM 2 MG/ML
0.5 INJECTION INTRAMUSCULAR ONCE
Status: DISCONTINUED | OUTPATIENT
Start: 2023-03-30 | End: 2023-03-30

## 2023-03-30 RX ORDER — LORAZEPAM 2 MG/ML
1 INJECTION INTRAMUSCULAR ONCE
Status: COMPLETED | OUTPATIENT
Start: 2023-03-30 | End: 2023-03-30

## 2023-03-30 RX ORDER — IOPAMIDOL 755 MG/ML
65 INJECTION, SOLUTION INTRAVASCULAR ONCE
Status: COMPLETED | OUTPATIENT
Start: 2023-03-30 | End: 2023-03-30

## 2023-03-30 RX ORDER — HYDROCODONE BITARTRATE AND ACETAMINOPHEN 5; 325 MG/1; MG/1
1 TABLET ORAL ONCE
Status: COMPLETED | OUTPATIENT
Start: 2023-03-30 | End: 2023-03-30

## 2023-03-30 RX ADMIN — SODIUM CHLORIDE 1000 ML: 9 INJECTION, SOLUTION INTRAVENOUS at 21:54

## 2023-03-30 RX ADMIN — HYDROCODONE BITARTRATE AND ACETAMINOPHEN 1 TABLET: 5; 325 TABLET ORAL at 22:00

## 2023-03-30 RX ADMIN — LORAZEPAM 1 MG: 2 INJECTION INTRAMUSCULAR; INTRAVENOUS at 19:38

## 2023-03-30 RX ADMIN — SODIUM CHLORIDE 1000 ML: 9 INJECTION, SOLUTION INTRAVENOUS at 17:08

## 2023-03-30 RX ADMIN — IOPAMIDOL 65 ML: 755 INJECTION, SOLUTION INTRAVENOUS at 21:26

## 2023-03-30 ASSESSMENT — ACTIVITIES OF DAILY LIVING (ADL)
ADLS_ACUITY_SCORE: 35

## 2023-03-30 NOTE — TELEPHONE ENCOUNTER
Writer spoke with Sabrina HEWITT regarding triage note from today about pt's reported symptoms of dizziness, vision changes, low BP.    Plan to have pt go to local lab to have BMP, CBC checked.     Writer placed call to pt, no answer, voicemail left with direct callback number.    Iris Musa, RN BSN  Thoracic Surgery RN Care Coordinator  [phone]

## 2023-03-30 NOTE — TELEPHONE ENCOUNTER
Patient called back and reported that while her BP has improved and was 123/71 on home monitor at 2:00pm today, she is now experiencing SOB and sharp chest pain under her left breast. Pt was becoming winded and pausing during conversation. She mentioned she was able to eat some fruit today and has been drinking Ensure.     Discussed that it was recommended for her to have BMP and CBC checked due to her past low sodium issues as well as lower hgb (9.8 on 3/25) but with new c/o SOB and chest pain, she should proceed to ED.    Pt stated that her  was home with her and will drive her to ED now.    Iris Musa, RN BSN  Thoracic Surgery RN Care Coordinator  [phone]

## 2023-03-30 NOTE — ED PROVIDER NOTES
[place] EMERGENCY DEPARTMENT ([facility])    3/30/23       ED PROVIDER NOTE  ED 2 4:21 PM   History   No chief complaint on file.    The history is provided by the patient.     Sonny Rush is a 67 year old female who presents to the emergency department for further evaluation of shortness of breath, chest pain, possible hypotension and tachycardia. Patient was recently hospitalized from 3/24-3/27/2023 for further evaluation of left lower lobe cavitary nodule.  She underwent lobectomy, wedge resection and mediastinal lymph node dissection.  Pathology showed this to be adenocarcinoma. She was discharged to home 3 days ago. Patient has been recovering at home, has been doing okay. She has been using her incentive spirometer.  She had a good day yesterday but last night started to notice increased pain.  She has been taking pain medications for this. Today she woke up at her usual state of health, took a shower, got dressed, ate some food. Suddenly around 2 PM she started to feel awful with nausea, increased shortness of breath, increased pain at her incision/ribs.  She checked her blood pressure at home and found it to be low.  Her blood pressure did improve on home monitor but she became increasingly short of breath with increased labored breathing and speaking to the point where she was getting winded just talking and having to pause during conversations.   contacted her team prior to coming into the ER.  Here she continues to feel sick and near syncopal, continues to have pain in her chest as well as the shortness of breath.  She notes having had some ongoing pain in her ribs since the surgery, this is not new nor changed.  She tried taking muscle relaxer at 3-4 PM for cramping pain in her left chest, left back and left axilla. However this generalized malaise with lightheadedness and nausea is very new for her. Worst symptom at this time is the lightheadedness, fatigue, and shortness  "of breath. No new pain anywhere. No fevers. She feels cold like she can't get warm enough. No dysuria.  No pain in legs calves or knees. No new chest pain.  She notes that her feet feel tingly. No unilateral weakness in her arms or legs.  also presents history, states that she \"started going downhill pretty quickly\" with feeling very weird, weak, lightheaded, and short of breath.  states she has labored breathing and some difficulty with speaking at baseline but became markedly worse today.  No vomiting, but hasn't been eating as much. No new medications.      states that she was prescribed muscle relaxer and OxyContin for her pain    Past Medical History  Past Medical History:   Diagnosis Date     Anxiety      CKD (chronic kidney disease) stage 3, GFR 30-59 ml/min (H)      DCIS (ductal carcinoma in situ)      HTN (hypertension)      Hypothyroidism      Malignant neoplasm (H)      Meniere's disease      Migraines     ON NEURONTIN, sees neurologist     Mild depression     sees psych     Osteopenia      Palpitations      PMB (postmenopausal bleeding) 01/01/2006    Had  D & C 2/06 showing inactive endometrium     Pulmonary nodules      Past Surgical History:   Procedure Laterality Date     BIOPSY BREAST SEED LOCALIZATION  7/3/2012    Procedure: BIOPSY BREAST SEED LOCALIZATION;  RIGHT BREAST LUMPECTOMY WITH ULTRASOUND SEED LOCALIZATION;  Surgeon: Jaclyn Dalal MD;  Location:  [facility]     BREAST BIOPSY, RT/LT  11/29/00    stereotactic bx right breast--fibrocystic change     COLONOSCOPY N/A 1/30/2017    Procedure: COMBINED COLONOSCOPY, SINGLE OR MULTIPLE BIOPSY/POLYPECTOMY BY BIOPSY;  Surgeon: Muriel Wolff MD;  Location:  GI     DAVINCI LOBECTOMY LUNG Left 3/24/2023    Procedure: LOBECTOMY, LUNG, ROBOT-ASSISTED- wedge resection, COMPLETION LOWER LOBECTOMY, MEDIASTINAL LYMPH NODE DISSECTION;  Surgeon: Tran Hays MD;  Location: [facility] OR     DILATION AND CURETTAGE  2/27/06    inactive " endometrium on path; done for PMB     ENT SURGERY      ear tubes, rhinoplasty     LAPAROSCOPY      Age 27 for pelvic pain     ORTHOPEDIC SURGERY      hammertoe     acetaminophen (TYLENOL) 325 MG tablet  buPROPion (WELLBUTRIN XL) 150 MG 24 hr tablet  busPIRone (BUSPAR) 15 MG tablet  chlorthalidone (HYGROTON) 25 MG tablet  cloNIDine (CATAPRES-TTS1) 0.1 MG/24HR WK patch  enoxaparin ANTICOAGULANT (LOVENOX) 40 MG/0.4ML syringe  folic acid (FOLVITE) 1 MG tablet  gabapentin (NEURONTIN) 100 MG capsule  HydrOXYzine Pamoate (VISTARIL PO)  levothyroxine (SYNTHROID/LEVOTHROID) 88 MCG tablet  losartan (COZAAR) 100 MG tablet  methocarbamol (ROBAXIN) 500 MG tablet  mirtazapine (REMERON) 7.5 MG tablet  ondansetron (ZOFRAN ODT) 8 MG ODT tab  oxyCODONE (ROXICODONE) 5 MG tablet  polyethylene glycol (MIRALAX) 17 GM/Dose powder  rizatriptan (MAXALT-MLT) 10 MG ODT  senna-docusate (SENOKOT-S/PERICOLACE) 8.6-50 MG tablet  tiZANidine (ZANAFLEX) 2 MG tablet  valACYclovir (VALTREX) 1000 mg tablet  verapamil ER (VERELAN) 180 MG 24 hr capsule  Zoledronic Acid (RECLAST IV)      Allergies   Allergen Reactions     Codeine Sulfate      Cough syrup  Hyperactive, anxiety     Depakote      Pill form caused GI disturbance.      Niacin Itching     Prednisone Anxiety     Family History  Family History   Problem Relation Age of Onset     Hyperlipidemia Father      Hypertension Father      Heart Surgery Father      Anesthesia Reaction Father         delirium after CABG     Breast Cancer Other         aunt     Ovarian Cancer Other      Uterine Cancer Other      Venous thrombosis No family hx of      Social History   Social History     Tobacco Use     Smoking status: Former     Types: Cigarettes     Quit date:      Years since quittin.2     Smokeless tobacco: Never     Tobacco comments:     Smoked socially    Substance Use Topics     Alcohol use: Yes     Comment: occasional     Drug use: No         A medically appropriate review of systems was  performed with pertinent positives and negatives noted in the HPI, and all other systems negative.    Physical Exam   BP: (!) 153/91  Pulse: 85  Temp: 97.7  F (36.5  C)  Resp: 24  SpO2: (!) 88 %  Physical Exam  Vitals and nursing note reviewed.   Constitutional:       General: She is not in acute distress.     Appearance: She is well-developed. She is not diaphoretic.   HENT:      Head: Normocephalic and atraumatic.   Eyes:      General: No scleral icterus.  Cardiovascular:      Rate and Rhythm: Normal rate and regular rhythm.   Musculoskeletal:      Cervical back: Normal range of motion and neck supple.   Skin:     General: Skin is warm and dry.      Coloration: Skin is not pale.      Findings: No erythema or rash.   Neurological:      Mental Status: She is alert and oriented to person, place, and time.             ED Course, Procedures, & Data      Procedures       ED Course Selections:        EKG Interpretation:      Interpreted by Juli Samuels MD  Time reviewed: per Epic  Symptoms at time of EKG: sob   Rhythm: normal sinus   Rate: normal  Axis: normal  Ectopy: none  Conduction: normal  ST Segments/ T Waves: No ST-T wave changes  Q Waves: none  Comparison to prior: Unchanged    Clinical Impression: normal EKG                     Results for orders placed or performed during the hospital encounter of 03/30/23   [place] Draw     Status: None (In process)    Narrative    The following orders were created for panel order [place] Draw.  Procedure                               Abnormality         Status                     ---------                               -----------         ------                     Extra Blue Top Tube[[id number]]                                                         Extra Red Top Tube[[id number]]                               In process                 Extra Green Top (Lithium...[[id number]]                      In process                 Extra Purple Top Tube[[id number]]                                                          Please view results for these tests on the individual orders.   [place] Draw     Status: None (In process)    Narrative    The following orders were created for panel order [place] Draw.  Procedure                               Abnormality         Status                     ---------                               -----------         ------                     Extra Green Top (Lithium...[[id number]]                      In process                   Please view results for these tests on the individual orders.   Glucose by meter     Status: Normal   Result Value Ref Range    GLUCOSE BY METER POCT 99 70 - 99 mg/dL   iStat Gases (lactate) venous, POCT     Status: Abnormal   Result Value Ref Range    Lactic Acid POCT 1.1 <=2.0 mmol/L    Bicarbonate Venous POCT 24 21 - 28 mmol/L    O2 Sat, Venous POCT 25 (L) 94 - 100 %    pCO2V Venous POCT 33 (L) 40 - 50 mm Hg    pH Venous POCT 7.48 (H) 7.32 - 7.43    pO2 Venous POCT 16 (L) 25 - 47 mm Hg   Creatinine POCT     Status: Abnormal   Result Value Ref Range    Creatinine POCT 1.5 (H) 0.5 - 1.0 mg/dL    GFR, ESTIMATED POCT 37 (L) >60 mL/min/1.73m2     Medications - No data to display  Labs Ordered and Resulted from Time of ED Arrival to Time of ED Departure   ISTAT GASES LACTATE VENOUS POCT - Abnormal       Result Value    Lactic Acid POCT 1.1      Bicarbonate Venous POCT 24      O2 Sat, Venous POCT 25 (*)     pCO2V Venous POCT 33 (*)     pH Venous POCT 7.48 (*)     pO2 Venous POCT 16 (*)    ISTAT CREATININE POCT - Abnormal    Creatinine POCT 1.5 (*)     GFR, ESTIMATED POCT 37 (*)    GLUCOSE BY METER - Normal    GLUCOSE BY METER POCT 99     BLOOD GAS VENOUS   INR   PARTIAL THROMBOPLASTIN TIME   LACTIC ACID WHOLE BLOOD   TROPONIN T, HIGH SENSITIVITY   MAGNESIUM   CRP INFLAMMATION   ERYTHROCYTE SEDIMENTATION RATE AUTO   ROUTINE UA WITH MICROSCOPIC REFLEX TO CULTURE   D DIMER QUANTITATIVE   ISTAT CREATININE POCT   BLOOD CULTURE   BLOOD CULTURE    ABO/RH TYPE AND SCREEN     XR Chest Port 1 View    (Results Pending)          Critical care was not performed.     Medical Decision Making  The patient's presentation was of moderate complexity (an undiagnosed new problem with uncertain diagnosis).    The patient's evaluation involved:  ordering and/or review of 3+ test(s) in this encounter (see separate area of note for details)    The patient's management necessitated further care after sign-out to Evening staff (see their note for further management).      Assessment & Plan      67 year old female who presents to the emergency department for further evaluation of chest pain, shortness of breath, possible hypotension and tachycardia. Patient was recently hospitalized from 3/24-3/27/2023 for further evaluation of left lower lobe cavitary nodule with path reports revealing adenocarcinoma.  Vital signs in triage notable for elevated blood pressure 153/91 and her initial sats of 9088% on room air normalized into the lower 90s without intervention.  EKG obtained which revealed normal sinus rhythm without any acute ischemic changes.  X-ray of the chest was obtained interpreted by me which revealed no acute process.  IVs established, labs drawn sent reviewed document epic remarkable for creatinine elevation 1.35, D-dimer elevation 1.64, ESR 55 with otherwise normal CBC and electrolytes.  Lactic acid normal at 1.1.  Patient is given a liter of normal saline IV fluid bolus x2 along with Ativan 1 mg IV for patient's subsequent report of anxiety.  CT chest was ordered and pending at time of this dictation.  Patient signed out to evening staff pending CT imaging results, if unremarkable patient would benefit from holding on observation status for chest pain protocol    I have reviewed the nursing notes. I have reviewed the findings, diagnosis, plan and need for follow up with the patient.    New Prescriptions    No medications on file       Final diagnoses:   Dyspnea,  unspecified type     I, Giovanna Benitez, am serving as a trained medical scribe to document services personally performed by Juli Samuels MD based on the provider's statements to me on March 30, 2023.  This document has been checked and approved by the attending provider.    I, Juli Samuels MD, was physically present and have reviewed and verified the accuracy of this note documented by Giovanna Benitez, medical scribe.      Juli Samuels MD     [facility] EMERGENCY DEPARTMENT  3/30/2023     Juli Samuels MD  03/30/23 2049

## 2023-03-30 NOTE — ED TRIAGE NOTES
Pt to triage via wheelchair with shortness of breath and difficulty speaking. States she had low blood pressure at home and complains of tingling in feet. SpO2 86%. Pt to stab2     Triage Assessment     Row Name 03/30/23 1621       Triage Assessment (Adult)    Airway WDL WDL       Respiratory WDL    Respiratory WDL X;all    Rhythm/Pattern, Respiratory shortness of breath       Skin Circulation/Temperature WDL    Skin Circulation/Temperature WDL X  pale    Skin Temperature cool       Cardiac WDL    Cardiac WDL WDL       Peripheral/Neurovascular WDL    Peripheral Neurovascular WDL WDL       Cognitive/Neuro/Behavioral WDL    Cognitive/Neuro/Behavioral WDL X    Speech slow

## 2023-03-30 NOTE — TELEPHONE ENCOUNTER
"Oncology Nurse Triage  Situation:   Sonny reporting the following symptoms: dizzy low blood pressure    Background:   Treating Provider:  Dr. Hays    Date of last office visit: 3/2423 for surgery    Recent Treatments: lobectomy and lymph node dissection on 3/24/23     Assessment:     Onset: today  Spouse called with pt in background, to report pt had surgery last week, has been home since Monday, activity level is improving every day. Today, pt got up and ate, then went back to bed and became dizzy and peripheral vision became \"webby\". They checked her BP and it was 98/56 and 96/62 on home meter.  Pt is drinking 3-4 bottle, juice and 1 Ensure for fluids/day.   Pt took a muscle relaxer at around 0600, has not taken oxycodone today, has only been taking the Oxycodone at night, otherwise using Tylenol for pain during the day. Pt does take blood pressure medications and diuretics, did take medications this AM.  Pt states vision is back to normal and dizziness has improved since laying down.     Recommendations:   Writer reviewed to call triage or go to ED if BP drops below 90/50 or if chest pain/shortness of breath occurs, encouraged pt to push fluids, educated to change positions slowly and encouraged pt to continue to monitor BP. Spouse voiced understanding.     Encounter routed to JACKIE Simmons and  Sabrina Lopez..             "

## 2023-03-30 NOTE — OP NOTE
Procedure Date: 3/24/23     PREOPERATIVE DIAGNOSIS:  Left lower lobe lung nodule     POSTOPERATIVE DIAGNOSIS:  Left lower lobe lung cancer.     PROCEDURE PERFORMED:  Robot-assisted thoracoscopic left lower lobe wedge resection, lower lobectomy and mediastinal lymph node dissection.     OPERATIVE FINDINGS:  Left lower lobe lung lesion as noted on CAT scan.     SURGEON:  Dr. Tran Hays.       ASSISTING ASSISTANT:  Beny Tobar, Registered PA.  Karol Swartzdavis assisted on the procedure due to lack of other adequately trained help.     Hernesto Rosales MD    OPERATIVE BLOOD LOSS:  30 mL     DESCRIPTION OF PROCEDURE:  After obtaining informed consent, the patient was brought to the operating room and laid supine on the operating table.  The patient was then positioned in the right lateral decubitus. The leftt chest was prepped and draped in a sterile manner.  We then proceeded with a standard robotic ports, which included two 8 mm ports and two 12 mm ports along the eighth intercostal space.  We had one assistant port inferiorly.  Upon entering the chest cavity,we easily identified the LLL lesion. A wedge resection was performed and this returned positive for malignancy.. The inferior pulmonary ligament was taken down, and the level 9 lymph node was sent off for frozen section.  A posterior mediastinal dissection was performed, and level 6 and level 5 lymph nodes were sent off for frozen section pathology.  The mediastinal pleura was then opened up medially and superiorly.    All these lymph nodes came back negative for malignancy.  We then proceeded to isolate the inferior pulmonary vein, and this was transected with a vascular load on the stapler.  The pulmonary artery branches to the leftt lower lobe were transected with vascular loads on the stapler.  The bronchus was then identified and transected with a green load on the stapler after confirming bronchial anatomy with a bronchoscopy.  The fissure was then   using multiple blue loads on the stapler.  The specimen was removed using an anchor bag by extending the incision for the assistant port.  After this, hemostasis was ensured.  A 28-Setswana straight chest tube was left in the pleural cavity.  The intercostal spaces were injected with the bupivacaine mixture.  The lung was reinflated, and the port sites were closed in layers.  The patient tolerated the procedure well.

## 2023-03-31 VITALS
SYSTOLIC BLOOD PRESSURE: 141 MMHG | DIASTOLIC BLOOD PRESSURE: 81 MMHG | HEART RATE: 79 BPM | TEMPERATURE: 98.1 F | OXYGEN SATURATION: 99 % | RESPIRATION RATE: 16 BRPM

## 2023-03-31 PROBLEM — R06.00 DYSPNEA, UNSPECIFIED TYPE: Status: ACTIVE | Noted: 2023-03-31

## 2023-03-31 PROBLEM — C34.92 ADENOCARCINOMA OF LEFT LUNG (H): Status: ACTIVE | Noted: 2023-03-31

## 2023-03-31 PROBLEM — I95.9 HYPOTENSION, UNSPECIFIED HYPOTENSION TYPE: Status: RESOLVED | Noted: 2023-03-31 | Resolved: 2023-03-31

## 2023-03-31 PROBLEM — I95.9 HYPOTENSION, UNSPECIFIED HYPOTENSION TYPE: Status: ACTIVE | Noted: 2023-03-31

## 2023-03-31 LAB
ALBUMIN SERPL BCG-MCNC: 3.3 G/DL (ref 3.5–5.2)
ALP SERPL-CCNC: 88 U/L (ref 35–104)
ALT SERPL W P-5'-P-CCNC: 59 U/L (ref 10–35)
ANION GAP SERPL CALCULATED.3IONS-SCNC: 13 MMOL/L (ref 7–15)
AST SERPL W P-5'-P-CCNC: 60 U/L (ref 10–35)
BILIRUB SERPL-MCNC: 0.3 MG/DL
BUN SERPL-MCNC: 9.5 MG/DL (ref 8–23)
CALCIUM SERPL-MCNC: 8.1 MG/DL (ref 8.8–10.2)
CHLORIDE SERPL-SCNC: 104 MMOL/L (ref 98–107)
CREAT SERPL-MCNC: 0.9 MG/DL (ref 0.51–0.95)
CREAT SERPL-MCNC: 1.35 MG/DL (ref 0.51–0.95)
DEPRECATED HCO3 PLAS-SCNC: 18 MMOL/L (ref 22–29)
ERYTHROCYTE [DISTWIDTH] IN BLOOD BY AUTOMATED COUNT: 12.5 % (ref 10–15)
GFR SERPL CREATININE-BSD FRML MDRD: 42 ML/MIN/1.73M2
GFR SERPL CREATININE-BSD FRML MDRD: 69 ML/MIN/1.73M2
GLUCOSE SERPL-MCNC: 170 MG/DL (ref 70–99)
HCT VFR BLD AUTO: 35.8 % (ref 35–47)
HGB BLD-MCNC: 10.4 G/DL (ref 11.7–15.7)
MCH RBC QN AUTO: 32.1 PG (ref 26.5–33)
MCHC RBC AUTO-ENTMCNC: 29.1 G/DL (ref 31.5–36.5)
MCV RBC AUTO: 111 FL (ref 78–100)
PLATELET # BLD AUTO: 428 10E3/UL (ref 150–450)
POTASSIUM SERPL-SCNC: 4.2 MMOL/L (ref 3.4–5.3)
PROT SERPL-MCNC: 5.9 G/DL (ref 6.4–8.3)
RBC # BLD AUTO: 3.24 10E6/UL (ref 3.8–5.2)
SODIUM SERPL-SCNC: 135 MMOL/L (ref 136–145)
WBC # BLD AUTO: 5.6 10E3/UL (ref 4–11)

## 2023-03-31 PROCEDURE — 36415 COLL VENOUS BLD VENIPUNCTURE: CPT | Performed by: STUDENT IN AN ORGANIZED HEALTH CARE EDUCATION/TRAINING PROGRAM

## 2023-03-31 PROCEDURE — 96372 THER/PROPH/DIAG INJ SC/IM: CPT | Performed by: INTERNAL MEDICINE

## 2023-03-31 PROCEDURE — 96361 HYDRATE IV INFUSION ADD-ON: CPT

## 2023-03-31 PROCEDURE — 250N000013 HC RX MED GY IP 250 OP 250 PS 637: Performed by: EMERGENCY MEDICINE

## 2023-03-31 PROCEDURE — 96375 TX/PRO/DX INJ NEW DRUG ADDON: CPT

## 2023-03-31 PROCEDURE — 36415 COLL VENOUS BLD VENIPUNCTURE: CPT | Performed by: INTERNAL MEDICINE

## 2023-03-31 PROCEDURE — 99207 PR APP CREDIT; MD BILLING SHARED VISIT: CPT | Performed by: STUDENT IN AN ORGANIZED HEALTH CARE EDUCATION/TRAINING PROGRAM

## 2023-03-31 PROCEDURE — 81456 SO/HL 51/>GSAP RNA ALYS: CPT | Performed by: INTERNAL MEDICINE

## 2023-03-31 PROCEDURE — 85027 COMPLETE CBC AUTOMATED: CPT | Performed by: STUDENT IN AN ORGANIZED HEALTH CARE EDUCATION/TRAINING PROGRAM

## 2023-03-31 PROCEDURE — G0452 MOLECULAR PATHOLOGY INTERPR: HCPCS | Mod: 26 | Performed by: PATHOLOGY

## 2023-03-31 PROCEDURE — 80053 COMPREHEN METABOLIC PANEL: CPT | Performed by: STUDENT IN AN ORGANIZED HEALTH CARE EDUCATION/TRAINING PROGRAM

## 2023-03-31 PROCEDURE — 258N000003 HC RX IP 258 OP 636: Performed by: INTERNAL MEDICINE

## 2023-03-31 PROCEDURE — 258N000003 HC RX IP 258 OP 636: Performed by: STUDENT IN AN ORGANIZED HEALTH CARE EDUCATION/TRAINING PROGRAM

## 2023-03-31 PROCEDURE — 250N000011 HC RX IP 250 OP 636: Performed by: INTERNAL MEDICINE

## 2023-03-31 PROCEDURE — 99236 HOSP IP/OBS SAME DATE HI 85: CPT | Performed by: INTERNAL MEDICINE

## 2023-03-31 PROCEDURE — 87040 BLOOD CULTURE FOR BACTERIA: CPT | Performed by: INTERNAL MEDICINE

## 2023-03-31 PROCEDURE — 250N000013 HC RX MED GY IP 250 OP 250 PS 637: Performed by: INTERNAL MEDICINE

## 2023-03-31 PROCEDURE — 99223 1ST HOSP IP/OBS HIGH 75: CPT | Mod: 24 | Performed by: INTERNAL MEDICINE

## 2023-03-31 RX ORDER — PROCHLORPERAZINE 25 MG
12.5 SUPPOSITORY, RECTAL RECTAL EVERY 12 HOURS PRN
Status: DISCONTINUED | OUTPATIENT
Start: 2023-03-31 | End: 2023-03-31 | Stop reason: HOSPADM

## 2023-03-31 RX ORDER — PROCHLORPERAZINE MALEATE 5 MG
5 TABLET ORAL EVERY 6 HOURS PRN
Status: DISCONTINUED | OUTPATIENT
Start: 2023-03-31 | End: 2023-03-31

## 2023-03-31 RX ORDER — ENOXAPARIN SODIUM 100 MG/ML
40 INJECTION SUBCUTANEOUS EVERY 24 HOURS
Status: DISCONTINUED | OUTPATIENT
Start: 2023-03-31 | End: 2023-03-31

## 2023-03-31 RX ORDER — OXYCODONE HYDROCHLORIDE 5 MG/1
5-10 TABLET ORAL EVERY 4 HOURS PRN
Status: DISCONTINUED | OUTPATIENT
Start: 2023-03-31 | End: 2023-03-31 | Stop reason: HOSPADM

## 2023-03-31 RX ORDER — NALOXONE HYDROCHLORIDE 0.4 MG/ML
0.4 INJECTION, SOLUTION INTRAMUSCULAR; INTRAVENOUS; SUBCUTANEOUS
Status: DISCONTINUED | OUTPATIENT
Start: 2023-03-31 | End: 2023-03-31 | Stop reason: HOSPADM

## 2023-03-31 RX ORDER — HYDROCODONE BITARTRATE AND ACETAMINOPHEN 5; 325 MG/1; MG/1
1 TABLET ORAL ONCE
Status: COMPLETED | OUTPATIENT
Start: 2023-03-31 | End: 2023-03-31

## 2023-03-31 RX ORDER — NALOXONE HYDROCHLORIDE 0.4 MG/ML
0.2 INJECTION, SOLUTION INTRAMUSCULAR; INTRAVENOUS; SUBCUTANEOUS
Status: DISCONTINUED | OUTPATIENT
Start: 2023-03-31 | End: 2023-03-31 | Stop reason: HOSPADM

## 2023-03-31 RX ORDER — NALOXONE HYDROCHLORIDE 0.4 MG/ML
0.2 INJECTION, SOLUTION INTRAMUSCULAR; INTRAVENOUS; SUBCUTANEOUS
Status: DISCONTINUED | OUTPATIENT
Start: 2023-03-31 | End: 2023-03-31

## 2023-03-31 RX ORDER — PROCHLORPERAZINE MALEATE 5 MG
5 TABLET ORAL EVERY 6 HOURS PRN
Status: DISCONTINUED | OUTPATIENT
Start: 2023-03-31 | End: 2023-03-31 | Stop reason: HOSPADM

## 2023-03-31 RX ORDER — LIDOCAINE 40 MG/G
CREAM TOPICAL
Status: DISCONTINUED | OUTPATIENT
Start: 2023-03-31 | End: 2023-03-31 | Stop reason: HOSPADM

## 2023-03-31 RX ORDER — OXYCODONE HYDROCHLORIDE 5 MG/1
5-10 TABLET ORAL EVERY 4 HOURS PRN
Status: DISCONTINUED | OUTPATIENT
Start: 2023-03-31 | End: 2023-03-31

## 2023-03-31 RX ORDER — NALOXONE HYDROCHLORIDE 0.4 MG/ML
0.4 INJECTION, SOLUTION INTRAMUSCULAR; INTRAVENOUS; SUBCUTANEOUS
Status: DISCONTINUED | OUTPATIENT
Start: 2023-03-31 | End: 2023-03-31

## 2023-03-31 RX ORDER — BISACODYL 5 MG
10 TABLET, DELAYED RELEASE (ENTERIC COATED) ORAL DAILY PRN
Status: DISCONTINUED | OUTPATIENT
Start: 2023-03-31 | End: 2023-03-31

## 2023-03-31 RX ORDER — HYDROMORPHONE HYDROCHLORIDE 1 MG/ML
0.5 INJECTION, SOLUTION INTRAMUSCULAR; INTRAVENOUS; SUBCUTANEOUS EVERY 4 HOURS PRN
Status: DISCONTINUED | OUTPATIENT
Start: 2023-03-31 | End: 2023-03-31 | Stop reason: HOSPADM

## 2023-03-31 RX ORDER — ONDANSETRON 4 MG/1
4 TABLET, ORALLY DISINTEGRATING ORAL EVERY 6 HOURS PRN
Status: DISCONTINUED | OUTPATIENT
Start: 2023-03-31 | End: 2023-03-31

## 2023-03-31 RX ORDER — ONDANSETRON 2 MG/ML
4 INJECTION INTRAMUSCULAR; INTRAVENOUS EVERY 6 HOURS PRN
Status: DISCONTINUED | OUTPATIENT
Start: 2023-03-31 | End: 2023-03-31

## 2023-03-31 RX ORDER — BISACODYL 10 MG
10 SUPPOSITORY, RECTAL RECTAL DAILY PRN
Status: DISCONTINUED | OUTPATIENT
Start: 2023-03-31 | End: 2023-03-31

## 2023-03-31 RX ORDER — LABETALOL 100 MG/1
100-200 TABLET, FILM COATED ORAL EVERY 6 HOURS PRN
Status: DISCONTINUED | OUTPATIENT
Start: 2023-03-31 | End: 2023-03-31

## 2023-03-31 RX ORDER — LIDOCAINE 40 MG/G
CREAM TOPICAL
Status: DISCONTINUED | OUTPATIENT
Start: 2023-03-31 | End: 2023-03-31

## 2023-03-31 RX ORDER — CALCIUM CARBONATE 500 MG/1
1000 TABLET, CHEWABLE ORAL 4 TIMES DAILY PRN
Status: DISCONTINUED | OUTPATIENT
Start: 2023-03-31 | End: 2023-03-31

## 2023-03-31 RX ORDER — ACETAMINOPHEN 325 MG/1
975 TABLET ORAL EVERY 8 HOURS PRN
Status: DISCONTINUED | OUTPATIENT
Start: 2023-03-31 | End: 2023-03-31

## 2023-03-31 RX ORDER — ONDANSETRON 4 MG/1
4 TABLET, ORALLY DISINTEGRATING ORAL EVERY 6 HOURS PRN
Status: DISCONTINUED | OUTPATIENT
Start: 2023-03-31 | End: 2023-03-31 | Stop reason: HOSPADM

## 2023-03-31 RX ORDER — LORAZEPAM 0.5 MG/1
1 TABLET ORAL ONCE
Status: COMPLETED | OUTPATIENT
Start: 2023-03-31 | End: 2023-03-31

## 2023-03-31 RX ORDER — BISACODYL 5 MG
5 TABLET, DELAYED RELEASE (ENTERIC COATED) ORAL DAILY PRN
Status: DISCONTINUED | OUTPATIENT
Start: 2023-03-31 | End: 2023-03-31

## 2023-03-31 RX ORDER — HYDROMORPHONE HYDROCHLORIDE 1 MG/ML
0.5 INJECTION, SOLUTION INTRAMUSCULAR; INTRAVENOUS; SUBCUTANEOUS
Status: DISCONTINUED | OUTPATIENT
Start: 2023-03-31 | End: 2023-03-31

## 2023-03-31 RX ORDER — PROCHLORPERAZINE 25 MG
12.5 SUPPOSITORY, RECTAL RECTAL EVERY 12 HOURS PRN
Status: DISCONTINUED | OUTPATIENT
Start: 2023-03-31 | End: 2023-03-31

## 2023-03-31 RX ORDER — DEXTROSE MONOHYDRATE, SODIUM CHLORIDE, AND POTASSIUM CHLORIDE 50; 1.49; 9 G/1000ML; G/1000ML; G/1000ML
100 INJECTION, SOLUTION INTRAVENOUS CONTINUOUS
Status: DISCONTINUED | OUTPATIENT
Start: 2023-03-31 | End: 2023-03-31

## 2023-03-31 RX ORDER — POLYETHYLENE GLYCOL 3350 17 G/17G
17 POWDER, FOR SOLUTION ORAL DAILY PRN
Status: DISCONTINUED | OUTPATIENT
Start: 2023-03-31 | End: 2023-03-31

## 2023-03-31 RX ORDER — ONDANSETRON 2 MG/ML
4 INJECTION INTRAMUSCULAR; INTRAVENOUS EVERY 6 HOURS PRN
Status: DISCONTINUED | OUTPATIENT
Start: 2023-03-31 | End: 2023-03-31 | Stop reason: HOSPADM

## 2023-03-31 RX ORDER — OXYCODONE HYDROCHLORIDE 5 MG/1
5 TABLET ORAL ONCE
Status: COMPLETED | OUTPATIENT
Start: 2023-03-31 | End: 2023-03-31

## 2023-03-31 RX ORDER — ENOXAPARIN SODIUM 100 MG/ML
40 INJECTION SUBCUTANEOUS EVERY 24 HOURS
Status: DISCONTINUED | OUTPATIENT
Start: 2023-03-31 | End: 2023-03-31 | Stop reason: HOSPADM

## 2023-03-31 RX ADMIN — DEXTROSE MONOHYDRATE AND SODIUM CHLORIDE: 5; .9 INJECTION, SOLUTION INTRAVENOUS at 12:04

## 2023-03-31 RX ADMIN — ONDANSETRON 4 MG: 2 INJECTION INTRAMUSCULAR; INTRAVENOUS at 08:08

## 2023-03-31 RX ADMIN — DEXTROSE AND SODIUM CHLORIDE: 5; 900 INJECTION, SOLUTION INTRAVENOUS at 08:07

## 2023-03-31 RX ADMIN — LORAZEPAM 1 MG: 0.5 TABLET ORAL at 02:53

## 2023-03-31 RX ADMIN — HYDROCODONE BITARTRATE AND ACETAMINOPHEN 1 TABLET: 5; 325 TABLET ORAL at 00:31

## 2023-03-31 RX ADMIN — ENOXAPARIN SODIUM 40 MG: 40 INJECTION SUBCUTANEOUS at 11:19

## 2023-03-31 RX ADMIN — HYDROMORPHONE HYDROCHLORIDE 0.5 MG: 1 INJECTION, SOLUTION INTRAMUSCULAR; INTRAVENOUS; SUBCUTANEOUS at 09:08

## 2023-03-31 RX ADMIN — OXYCODONE HYDROCHLORIDE 5 MG: 5 TABLET ORAL at 13:10

## 2023-03-31 RX ADMIN — OXYCODONE HYDROCHLORIDE 5 MG: 5 TABLET ORAL at 06:00

## 2023-03-31 ASSESSMENT — ACTIVITIES OF DAILY LIVING (ADL)
ADLS_ACUITY_SCORE: 35

## 2023-03-31 NOTE — H&P
"HISTORY AND PHYSICAL    [facility]    Hospitalist Service, GOLD TEAM     Patient Name: Sonny Rush  YOB: 1955  Age/Gender: 67 year old female  Medical Record Number: [id number]    Date of Admission: 3/30/2023  Primary Care Physician: Olga Wang    HISTORY AND EXAM     Location seen: Emergency Room  Date of service:  3/31/2023    The patient's history is provided by chart review and the patient and is compromised by nothing.    CHIEF COMPLAINT: \"I'm not feeling well\"    HISTORY OF PRESENT ILLNESS    The patient is a 67 year old female, with h/o essential hypertension who is 5 days out from discharge from [facility] after YAZMIN robotic wedge resection for lung cancer - adenocarcinoma, multifocal and ? Small nesting of carcinoid tumor ?? Per path report.  Has not had Oncology follow up or plans for that.  Patient was told that she will not need either chemotherapy or XRT (radiation therapy).    She comes to the ER with complaints of feeling very weak and tired and lightheadedness X 2 days associated with her postoperative(ly) chest pain and denies any associated short(ness) of breath, sputum production, fevers, chills, sweats, palpitations, abdominal pain or any other new or concerning symptom(s).  These symptom(s) are severe, almost causing her to pass out.  She denies any bloody stools or emesis.  Symptom(s) onset gradual over hours and associated with a dizziness sensation as well.  She denies any associated symptoms or modifying factors.    Location / Radiation: diffuse, nonradiating  Duration / Onset: a few days, gradual onset  Quality: vague - lightheadedness   Severity: severe, almost falling  Timing / Triggering event: nothing identifiable   What makes the symptoms worse: standing / walking  What makes the symptoms better: resting  Context: recent robotic YAZMIN wedge resection of lung " cancer  Associated signs and Symptoms: severe fatigue  Additional information and other related symptoms or pertinent negatives: see above  ---------------------------------------------------------------------------------------------------------------  The patient's presentation and clinical findings were reviewed and/or discussed with Dr. KENZIE ROTH. In addition, I reviewed the prior care documentation notes (e.g. ER staff / nursing, clinic, referring hospital, consultants) for this hospitalization.  ---------------------------------------------------------------------------------------------------------------    REVIEW OF SYSTEMS  The remainder of the 14 system / comprehensive review of systems (ROS) was reviewed with the patient and/or representative providing the history and is negative and otherwise as documented in my HPI above.    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      HTN (hypertension)      Hypothyroidism      Malignant neoplasm of left lung, unspecified part of lung (H)      Meniere's disease      Migraines     ON NEURONTIN, sees neurologist     Mild depression     sees psych     Osteopenia      Palpitations      PMB (postmenopausal bleeding) 01/01/2006    Had  D & C 2/06 showing inactive endometrium     Pulmonary nodules      Past Surgical History:   Procedure Laterality Date     BIOPSY BREAST SEED LOCALIZATION  7/3/2012    Procedure: BIOPSY BREAST SEED LOCALIZATION;  RIGHT BREAST LUMPECTOMY WITH ULTRASOUND SEED LOCALIZATION;  Surgeon: Jaclyn Dalal MD;  Location:  [facility]     BREAST BIOPSY, RT/LT  11/29/00    stereotactic bx right breast--fibrocystic change     COLONOSCOPY N/A 1/30/2017    Procedure: COMBINED COLONOSCOPY, SINGLE OR MULTIPLE BIOPSY/POLYPECTOMY BY BIOPSY;  Surgeon: Muriel Wolff MD;  Location: [facility]     DAVINCI LOBECTOMY LUNG Left 3/24/2023     Procedure: LOBECTOMY, LUNG, ROBOT-ASSISTED- wedge resection, COMPLETION LOWER LOBECTOMY, MEDIASTINAL LYMPH NODE DISSECTION;  Surgeon: Tran Hays MD;  Location: [facility] OR     DILATION AND CURETTAGE  2/27/06    inactive endometrium on path; done for PMB     ENT SURGERY      ear tubes, rhinoplasty     LAPAROSCOPY      Age 27 for pelvic pain     ORTHOPEDIC SURGERY      yaya     Family History   Problem Relation Age of Onset     Hyperlipidemia Father      Hypertension Father      Heart Surgery Father      Anesthesia Reaction Father         delirium after CABG     Bone Cancer Paternal Grandmother         jaw bone cancer     No Known Problems Paternal Grandfather         Lung cancer     Breast Cancer Other         aunt     Ovarian Cancer Other      Uterine Cancer Other      Venous thrombosis No family hx of      Social History     Social History Narrative     -  is 10years younger.    Prior smoker.    Lives in [facility].    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    No children.     is chastity.     HOME MEDICATION(S): Home medication(s) personally reviewed as below.  Prior to Admission medications    Medication Sig Last Dose Taking? Auth Provider Long Term End Date   acetaminophen (TYLENOL) 325 MG tablet Take 2 tablets (650 mg) by mouth every 4 hours as needed for other (For optimal non-opioid multimodal pain management to improve pain control.)  Yes Tran Hays MD No    buPROPion (WELLBUTRIN XL) 150 MG 24 hr tablet Take 150 mg by mouth every morning 3/23/2023 Yes Reported, Patient     busPIRone (BUSPAR) 15 MG tablet Take 15 mg by mouth 2 times daily 3/24/2023 Yes Reported, Patient Yes    chlorthalidone (HYGROTON) 25 MG tablet Take 25 mg by mouth daily 3/24/2023 Yes Reported, Patient No    cloNIDine (CATAPRES-TTS1) 0.1 MG/24HR WK patch Place 1 patch onto the skin once a week Changes on Sat morning 3/18/2023 Yes Reported, Patient     enoxaparin ANTICOAGULANT (LOVENOX) 40 MG/0.4ML syringe  Inject 0.4 mLs (40 mg) Subcutaneous daily for 7 days  Yes Tran Hays MD  4/3/23   gabapentin (NEURONTIN) 100 MG capsule Take 1 capsule (100 mg) by mouth nightly as needed for neuropathic pain  Yes Tran Hays MD Yes 4/10/23   levothyroxine (SYNTHROID/LEVOTHROID) 88 MCG tablet Take 88 mcg by mouth daily 3/24/2023 Yes Reported, Patient Yes    losartan (COZAAR) 100 MG tablet Take 100 mg by mouth daily 3/23/2023 Yes Reported, Patient No    methocarbamol (ROBAXIN) 500 MG tablet Take 1 tablet (500 mg) by mouth every 6 hours as needed for muscle spasms  Yes Tran Hays MD  4/10/23   mirtazapine (REMERON) 7.5 MG tablet Take 7.5 mg by mouth nightly as needed (insomnia)  Yes Reported, Patient No    ondansetron (ZOFRAN ODT) 8 MG ODT tab Take 8 mg by mouth every 8 hours as needed for nausea  Yes Unknown, Entered By History     oxyCODONE (ROXICODONE) 5 MG tablet Take 1 tablet (5 mg) by mouth every 6 hours as needed for severe pain (7-10)  Yes Tran Hays MD     polyethylene glycol (MIRALAX) 17 GM/Dose powder Take 17 g by mouth daily  Yes Tran Hays MD     rizatriptan (MAXALT-MLT) 10 MG ODT Take 1 tablet by mouth as needed  Yes Reported, Patient     senna-docusate (SENOKOT-S/PERICOLACE) 8.6-50 MG tablet Take 1 tablet by mouth 2 times daily  Yes Tran Hays MD     tiZANidine (ZANAFLEX) 2 MG tablet Take 1-3 tablets by mouth nightly as needed  Yes Reported, Patient     valACYclovir (VALTREX) 1000 mg tablet Take 1,000 mg by mouth 2 times daily as needed (cold sores)  Yes Unknown, Entered By History     verapamil ER (VERELAN) 180 MG 24 hr capsule Take 2 capsules (360 mg) by mouth At Bedtime 3/23/2023 Yes Shannon Cain, CNP Yes    Zoledronic Acid (RECLAST IV) Inject 5 mg into the vein once yearly 3/13/2023 Yes Unknown, Entered By History     folic acid (FOLVITE) 1 MG tablet Take 1 mg by mouth every other day  Patient not taking: Reported on 3/17/2023   Reported, Patient     HydrOXYzine Pamoate (VISTARIL  PO) Take 25 mg by mouth as needed  Patient not taking: Reported on 2/9/2023   Reported, Patient       ALLERGIES  Codeine sulfate, Depakote, Niacin, and Prednisone    PHYSICAL EXAM  Vital signs reviewed as below.    No data found.  Body mass index is 26.71 kg/m .  The exam is compromised by nothing    GENERAL APPEARANCE: comfortable, conversant, no acute distress with occasional(ly) wincing in pain with coughing.  HEENT / EYES / EARS / OROPHARYNX: Normal external appearance of head, eyes, ears, and nose. Oropharynx with moist mucous membranes.   No signs of trauma to head or neck. Eyes nonicteric, conjunctiva clear.  NECK: Supple, no palpable lymphadenopathy, no masses, no thyromegaly. Neck veins not distended. ROM normal.  Trachea midline.  LUNGS: Clear to auscultation with good airflow. No wheezes, rales or rhonchi.  No stridor.  HEART / CV / CHEST: Normal S1S2. RRR without rubs, gallops or clicks. No murmur noted. No edema. No clubbing or cyanosis.  GI: Normal active bowel sounds. Soft and nontender.  No hepatosplenomegaly or palpable masses. No hernias appreciated.  HEME / LYMPH / SKIN / EXTREMITIES  SKIN: Exposed areas grossly normal, warm and dry without unusual signs of bleeding, bruising or petechiae. No rashes. No mottling.  EXTREMITIES / JOINTS / BACK: Normal range of motion grossly and moving all extremity(ies) to command. No significant deformities of systemic diseases or other abnormalities. Joints without active inflammation or findings of synovitis. Back / Buttocks region: not examined.  NEUROLOGIC EXAM  PSYCH / MENTAL STATUS: alert, oriented to person, place, and time. Insight, judgement and interactions with others appears normal.  CRANIAL NERVES: PERRL, EOM's intact. Normal fluent speech. Normal symmetric appearing face.  MOTOR: Grossly normal muscle tone and strength in all extremities.  SENSATION: Grossly normal in all extremity(ies) to light touch  CEREBELLAR: within normal limits on upper  extremities coordination  GAIT: Not tested    LABS / XRAYS / RESULTS  Recent Labs   Lab 03/30/23  1628 03/30/23  1626 03/30/23  1623 03/27/23  0633 03/25/23  0818 03/24/23  1550 03/24/23  1101   WBC  --  10.0  --   --  9.0  --   --    HGB  --  12.0  --   --  9.8*  --  10.3*   MCV  --  97  --   --  97  --   --    PLT  --  499*  --  303 307   < >  --    INR  --  0.94  --   --   --   --   --    NA  --  131*  --   --  133*  --  137   POTASSIUM  --  4.1  --   --  4.1  --  3.5   CHLORIDE  --  95*  --   --  100  --   --    CO2  --  21*  --   --  20*  --   --    BUN  --  19.4  --   --  17.0  --   --    CR 1.5* 1.35*  1.35*  --   --  0.98*  --   --    ANIONGAP  --  15  --   --  13  --   --    DARRICK  --  9.6  --   --  8.3*  --   --    GLC  --  94 99  --  117*   < > 166*   ALBUMIN  --  3.9  --   --   --   --   --    PROTTOTAL  --  6.8  --   --   --   --   --    BILITOTAL  --  0.2  --   --   --   --   --    ALKPHOS  --  104  --   --   --   --   --    ALT  --  70*  --   --   --   --   --    AST  --  85*  --   --   --   --   --     < > = values in this interval not displayed.     Lab Results   Component Value Date    INR 0.94 03/30/2023    INR 1.03 10/05/2022     Recent Labs   Lab Test 03/30/23  1626   CTROPT 7     I personally reviewed the XRAY(s) listed below, including radiology images and corresponding radiologist reports if available.      I have personally reviewed the following data over the past 24 hrs:    10.0  \   12.0   / 499 (H)     131 (L) 95 (L) 19.4 /  94   4.1 21 (L) 1.5 (H) \       ALT: 70 (H) AST: 85 (H) AP: 104 TBILI: 0.2   ALB: 3.9 TOT PROTEIN: 6.8 LIPASE: N/A       Trop: 7 BNP: N/A       Procal: N/A CRP: 6.86 (H) Lactic Acid: 1.1; 1.1       INR:  0.94 PTT:  35   D-dimer:  1.64 (H) Fibrinogen:  N/A       Imaging results reviewed over the past 24 hrs:   Recent Results (from the past 24 hour(s))   XR Chest Port 1 View    Narrative    EXAM: XR CHEST PORT 1 VIEW  3/30/2023 4:48 PM     HISTORY:  sob        COMPARISON:   3/27/2023      FINDINGS:   The cardiac silhouette is within normal limits. Small left pleural  effusion. No pneumothorax. Streaky bibasilar opacities. No acute  osseous abnormality. The visualized upper abdomen is unremarkable.      Impression    IMPRESSION:   1. Small left pleural effusion with associated basilar atelectasis.  2. Streaky right basilar opacity, likely atelectasis.     I have personally reviewed the examination and initial interpretation  and I agree with the findings.    DAYNA العراقي MD         SYSTEM ID:  [id number]   CT Chest Pulmonary Embolism w Contrast    Narrative    Examination:  CT CHEST PULMONARY EMBOLISM W CONTRAST 3/30/2023 9:37 PM      Comparison: Chest CT 3/16/2023    History: SOB    TECHNIQUE: Volumetric helical acquisition of CT images of the chest  from the lung apices to the kidneys were acquired in arterial phase  after the administration of IV contrast. Three-dimensional (3D)  post-processed angiographic images were reconstructed, archived to  PACS and used in interpretation of this study.     Contrast dose: iopamidol (ISOVUE-370) solution 65 mL    FINDINGS:  Chest:  There is adequate opacification of the main and lobar pulmonary  arteries. No filling defect in the lobar and main segmental pulmonary  arteries to suggest pulmonary embolism.  There is no right-sided heart  strain. The heart is normal. No pericardial effusion. Mild to moderate  coronary artery calcifications. Normal caliber esophagus. Normal  caliber thoracic vasculature. The central airways are patent. Mild  central bronchial wall thickening. Small left and trace right pleural  effusions with associated compressive atelectasis. Small left  pneumothorax. Scattered areas of smooth interlobular septal  thickening. No suspicious pulmonary nodules. No focal airspace  consolidations. Postsurgical changes of mediastinal lymph node  dissection and left lower lobectomy. Subcutaneous emphysema  "overlying  the left lateral chest wall and overlying the anterior chest wall    Upper abdomen:   No acute findings in the visualized upper abdomen.     Bones:  Multiple chronic left rib fractures. Degenerative changes spine. No  suspicious or aggressive appearing bone lesions.      Impression    IMPRESSION:   1. No evidence of acute pulmonary embolism.  2. Small left pneumothorax, likely residual from recent left lower  lobectomy.  3. Small left and trace right pleural effusions.  4. Scattered areas of smooth interlobular septal thickening,  suggestive of pulmonary edema.    Imaging findings discussed with Dr. Carroll by Dr. Lee Kline on 9:56  PM on 3/30/2023.    I have personally reviewed the examination and initial interpretation  and I agree with the findings.    DAYNA العراقي MD         SYSTEM ID:  [id number]       Color Urine (no units)   Date Value   03/30/2023 Straw     Appearance Urine (no units)   Date Value   03/30/2023 Clear     Glucose Urine (mg/dL)   Date Value   03/30/2023 Negative     Bilirubin Urine (no units)   Date Value   03/30/2023 Negative     Ketones Urine (mg/dL)   Date Value   03/30/2023 Trace (A)     Specific Gravity Urine (no units)   Date Value   03/30/2023 1.010     pH Urine (no units)   Date Value   03/30/2023 6.0     Protein Albumin Urine (mg/dL)   Date Value   03/30/2023 Negative     Nitrite Urine (no units)   Date Value   03/30/2023 Negative     Leukocyte Esterase Urine (no units)   Date Value   03/30/2023 Negative       ASSESSMENT / PROBLEM LIST  PRINCIPAL DIAGNOSIS: Feeling unwell    Pt is a 67 year old female with newly documented lung cancer - s/p robotic assisted wedge resection --- now with vague symptom(s) of \"not feeling well\" with lightheadedness.  Has underlying chronic kidney disease (CKD).  ? Mild volume decreased - seems reasonable to start with giving IV fluid(s) and repeating labs and ask the thoracic surgery to see her and I've initiated a new consult to the Oncology " service - given the path report was a bit confusing to me ? Reference to carcinoid spot and that the margins of the tumor was very close to the surgical margins.  ? Further adjuvant treatment to be discussed - I reviewed her chart and I could not find any discussion that she had with anyone regarding these additional treatments - she recalls being told that nothing more needed to be done.    TNM Descriptors  m (multiple primary tumors)    pT Category  pT2    pN Category  pN0    ADDITIONAL FINDINGS   Additional Findings  Inflammation: Chronic      Carcinoid tumorlet    .     LUNG, COMPLETION LEFT LOWER LOBECTOMY:  - Lung parenchyma with carcinoid tumorlet (0.4 cm)    Assessment & Plan   Principal Problem:    Feeling unwell - lightheaded, nausea, cough, chest pain, fatigue postoperative state  Active Problems:    Adenocarcinoma of left lung (H)    Anxiety    Essential hypertension    Hypothyroidism    Adenocarcinoma of left lung (H)  Robotic surgery 3/24/2023 - Dr. Tran Hays - wedge resection  Operative note:  Preoperative diagnosis: Cavitary nodule of left lower lobe of lung  Post-operative diagnosis: Adenocarcinoma    Pathology:  A. LUNG, LEFT LOWER LOBE WEDGE (FROZEN SECTION), EXCISION:  - INVASIVE MUCINOUS ADENOCARCINOMA  - Tumor size: 1.6 cm in greatest dimension, multifocal  - Visceral pleural invasion is identified  - Lymphovascular invasion is not identified  - Parenchymal resection margin is involved by tumor; Staple line not included ( see part L for final margin status)  - AJCC Pathologic Stage: pT2 (m), N0  L.  LUNG, COMPLETION LEFT LOWER LOBECTOMY:  - Lung parenchyma with carcinoid tumorlet (0.4 cm)    Anxiety  Prior to admission medication(s): Atarax  Plan:  Continue PRN    Essential hypertension  Prior to admission medication(s): clonidine TTS, Hygroton, losartan (Cozaar ), verapamil  Plan:  Pharmacy medication reconciliation pending - to continue medications as at home.    Hypothyroidism  Prior to  "admission medication(s): levothyroxine  Plan:  Continue same    Feeling unwell - lightheaded, nausea, cough, chest pain, fatigue postoperative state  Symptom(s) onset within 5 days of discharge from thoracic surgery service - work up in the ER unremarkable.    Additional diagnoses and notable risk factors for complications during hospitalization:  Clinically Significant Risk Factors                        # Overweight: Estimated body mass index is 26.71 kg/m  as calculated from the following:    Height as of this encounter: 1.689 m (5' 6.5\").    Weight as of this encounter: 76.2 kg (167 lb 15.9 oz)., PRESENT ON ADMISSION       PLAN / OTHER ORDERS ENTERED:    1. Medication reconciliation by pharmacy  2. Oncology consult - to review pathology and confirm that no other treatment is indicated.  3. Thoracic surgery consult  4. Repeat labs      Admitted: Inpatient    Current disposition: Admit to the Adult Internal Medicine Service under Inpatient status    Scheduled and PRN medication(s):      Restraints: Not indicated    Discharge disposition: To be determined  Diet order:  Orders Placed This Encounter      Diet      Combination Diet Regular Diet Adult    DVT Prophylaxis: enoxaparin (Lovenox) SQ  Ayon Catheter: Not present  Lines: None     Cardiac Monitoring: yes  Code Status:        Patient / Family Communication: I discussed the details of her diagnoses and my recommendations with the patient.    Alfie Regan MD   "

## 2023-03-31 NOTE — H&P
"HISTORY AND PHYSICAL    [facility]    Hospitalist Service, GOLD TEAM     Patient Name: Sonny Rush  YOB: 1955  Age/Gender: 67 year old female  Medical Record Number: [id number]    Date of Admission: 3/24/2023  Primary Care Physician: Olga Wang    HISTORY AND EXAM     Location seen: Emergency Room  Date of service:  3/31/2023    The patient's history is provided by chart review and the patient and is compromised by nothing.    CHIEF COMPLAINT: \"I'm not feeling well\"    HISTORY OF PRESENT ILLNESS    The patient is a 67 year old female, with h/o essential hypertension who is 5 days out from discharge from [facility] after YAZMIN robotic wedge resection for lung cancer - adenocarcinoma, multifocal and ? Small nesting of carcinoid tumor ?? Per path report.  Has not had Oncology follow up or plans for that.  Patient was told that she will not need either chemotherapy or XRT (radiation therapy).    She comes to the ER with complaints of feeling very weak and tired and lightheadedness X 2 days associated with her postoperative(ly) chest pain and denies any associated short(ness) of breath, sputum production, fevers, chills, sweats, palpitations, abdominal pain or any other new or concerning symptom(s).  These symptom(s) are severe, almost causing her to pass out.  She denies any bloody stools or emesis.  Symptom(s) onset gradual over hours and associated with a dizziness sensation as well.    Location / Radiation: diffuse, nonradiating  Duration / Onset: a few days, gradual onset  Quality: vague - lightheadedness   Severity: severe, almost falling  Timing / Triggering event: nothing identifiable   What makes the symptoms worse: standing / walking  What makes the symptoms better: resting  Context: recent robotic YAZMIN wedge resection of lung cancer  Associated signs and Symptoms: severe fatigue  Additional " information and other related symptoms or pertinent negatives: see above  ---------------------------------------------------------------------------------------------------------------  The patient's presentation and clinical findings were reviewed and/or discussed with Dr. KENZIE ROTH. In addition, I reviewed the prior care documentation notes (e.g. ER staff / nursing, clinic, referring hospital, consultants) for this hospitalization.  ---------------------------------------------------------------------------------------------------------------    REVIEW OF SYSTEMS  The remainder of the 14 system / comprehensive review of systems (ROS) was reviewed with the patient and/or representative providing the history and is negative and otherwise as documented in my HPI above.    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      HTN (hypertension)      Hypothyroidism      Malignant neoplasm of left lung, unspecified part of lung (H)      Meniere's disease      Migraines     ON NEURONTIN, sees neurologist     Mild depression     sees psych     Osteopenia      Palpitations      PMB (postmenopausal bleeding) 01/01/2006    Had  D & C 2/06 showing inactive endometrium     Pulmonary nodules      Past Surgical History:   Procedure Laterality Date     BIOPSY BREAST SEED LOCALIZATION  7/3/2012    Procedure: BIOPSY BREAST SEED LOCALIZATION;  RIGHT BREAST LUMPECTOMY WITH ULTRASOUND SEED LOCALIZATION;  Surgeon: Jaclyn Dalal MD;  Location:  [facility]     BREAST BIOPSY, RT/LT  11/29/00    stereotactic bx right breast--fibrocystic change     COLONOSCOPY N/A 1/30/2017    Procedure: COMBINED COLONOSCOPY, SINGLE OR MULTIPLE BIOPSY/POLYPECTOMY BY BIOPSY;  Surgeon: Muriel Wolff MD;  Location:  GI     DAVINCI LOBECTOMY LUNG Left 3/24/2023    Procedure: LOBECTOMY, LUNG, ROBOT-ASSISTED- wedge resection, COMPLETION  LOWER LOBECTOMY, MEDIASTINAL LYMPH NODE DISSECTION;  Surgeon: Tran Hays MD;  Location: [facility] OR     DILATION AND CURETTAGE  2/27/06    inactive endometrium on path; done for PMB     ENT SURGERY      ear tubes, rhinoplasty     LAPAROSCOPY      Age 27 for pelvic pain     ORTHOPEDIC SURGERY      a[place]inertoe     Family History   Problem Relation Age of Onset     Hyperlipidemia Father      Hypertension Father      Heart Surgery Father      Anesthesia Reaction Father         delirium after CABG     Bone Cancer Paternal Grandmother         jaw bone cancer     No Known Problems Paternal Grandfather         Lung cancer     Breast Cancer Other         aunt     Ovarian Cancer Other      Uterine Cancer Other      Venous thrombosis No family hx of      Social History     Social History Narrative     -  is 10years younger.    Prior smoker.    Lives in [facility].    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    No children.     is chastity.     HOME MEDICATION(S): Home medication(s) personally reviewed as below.  Prior to Admission medications    Medication Sig Last Dose Taking? Auth Provider Long Term End Date   acetaminophen (TYLENOL) 325 MG tablet Take 2 tablets (650 mg) by mouth every 4 hours as needed for other (For optimal non-opioid multimodal pain management to improve pain control.)  Yes Tran Hays MD No    buPROPion (WELLBUTRIN XL) 150 MG 24 hr tablet Take 150 mg by mouth every morning 3/23/2023 Yes Reported, Patient     busPIRone (BUSPAR) 15 MG tablet Take 15 mg by mouth 2 times daily 3/24/2023 Yes Reported, Patient Yes    chlorthalidone (HYGROTON) 25 MG tablet Take 25 mg by mouth daily 3/24/2023 Yes Reported, Patient No    cloNIDine (CATAPRES-TTS1) 0.1 MG/24HR WK patch Place 1 patch onto the skin once a week Changes on Sat morning 3/18/2023 Yes Reported, Patient     enoxaparin ANTICOAGULANT (LOVENOX) 40 MG/0.4ML syringe Inject 0.4 mLs (40 mg) Subcutaneous daily for 7 days  Yes Tran Hays MD   4/3/23   gabapentin (NEURONTIN) 100 MG capsule Take 1 capsule (100 mg) by mouth nightly as needed for neuropathic pain  Yes Tran Hays MD Yes 4/10/23   levothyroxine (SYNTHROID/LEVOTHROID) 88 MCG tablet Take 88 mcg by mouth daily 3/24/2023 Yes Reported, Patient Yes    losartan (COZAAR) 100 MG tablet Take 100 mg by mouth daily 3/23/2023 Yes Reported, Patient No    methocarbamol (ROBAXIN) 500 MG tablet Take 1 tablet (500 mg) by mouth every 6 hours as needed for muscle spasms  Yes Tran Hays MD  4/10/23   mirtazapine (REMERON) 7.5 MG tablet Take 7.5 mg by mouth nightly as needed (insomnia)  Yes Reported, Patient No    ondansetron (ZOFRAN ODT) 8 MG ODT tab Take 8 mg by mouth every 8 hours as needed for nausea  Yes Unknown, Entered By History     oxyCODONE (ROXICODONE) 5 MG tablet Take 1 tablet (5 mg) by mouth every 6 hours as needed for severe pain (7-10)  Yes Tran Hays MD     polyethylene glycol (MIRALAX) 17 GM/Dose powder Take 17 g by mouth daily  Yes Tran Hays MD     rizatriptan (MAXALT-MLT) 10 MG ODT Take 1 tablet by mouth as needed  Yes Reported, Patient     senna-docusate (SENOKOT-S/PERICOLACE) 8.6-50 MG tablet Take 1 tablet by mouth 2 times daily  Yes Tran Hays MD     tiZANidine (ZANAFLEX) 2 MG tablet Take 1-3 tablets by mouth nightly as needed  Yes Reported, Patient     valACYclovir (VALTREX) 1000 mg tablet Take 1,000 mg by mouth 2 times daily as needed (cold sores)  Yes Unknown, Entered By History     verapamil ER (VERELAN) 180 MG 24 hr capsule Take 2 capsules (360 mg) by mouth At Bedtime 3/23/2023 Yes Shannon Cain, CNP Yes    Zoledronic Acid (RECLAST IV) Inject 5 mg into the vein once yearly 3/13/2023 Yes Unknown, Entered By History     folic acid (FOLVITE) 1 MG tablet Take 1 mg by mouth every other day  Patient not taking: Reported on 3/17/2023   Reported, Patient     HydrOXYzine Pamoate (VISTARIL PO) Take 25 mg by mouth as needed  Patient not taking: Reported on 2/9/2023    Reported, Patient       ALLERGIES  Codeine sulfate, Depakote, Niacin, and Prednisone    PHYSICAL EXAM  Vital signs reviewed as below.    No data found.  Body mass index is 26.71 kg/m .  The exam is compromised by nothing    GENERAL APPEARANCE: comfortable, conversant, no acute distress with occasional(ly) wincing in pain with coughing.  HEENT / EYES / EARS / OROPHARYNX: Normal external appearance of head, eyes, ears, and nose. Oropharynx with moist mucous membranes.   No signs of trauma to head or neck. Eyes nonicteric, conjunctiva clear.  NECK: Supple, no palpable lymphadenopathy, no masses, no thyromegaly. Neck veins not distended. ROM normal.  Trachea midline.  LUNGS: Clear to auscultation with good airflow. No wheezes, rales or rhonchi.  No stridor.  HEART / CV / CHEST: Normal S1S2. RRR without rubs, gallops or clicks. No murmur noted. No edema. No clubbing or cyanosis.  GI: Normal active bowel sounds. Soft and nontender.  No hepatosplenomegaly or palpable masses. No hernias appreciated.  HEME / LYMPH / SKIN / EXTREMITIES  SKIN: Exposed areas grossly normal, warm and dry without unusual signs of bleeding, bruising or petechiae. No rashes. No mottling.  EXTREMITIES / JOINTS / BACK: Normal range of motion grossly and moving all extremity(ies) to command. No significant deformities of systemic diseases or other abnormalities. Joints without active inflammation or findings of synovitis. Back / Buttocks region: not examined.  NEUROLOGIC EXAM  PSYCH / MENTAL STATUS: alert, oriented to person, place, and time. Insight, judgement and interactions with others appears normal.  CRANIAL NERVES: PERRL, EOM's intact. Normal fluent speech. Normal symmetric appearing face.  MOTOR: Grossly normal muscle tone and strength in all extremities.  SENSATION: Grossly normal in all extremity(ies) to light touch  CEREBELLAR: within normal limits on upper extremities coordination  GAIT: Not tested    LABS / XRAYS / RESULTS  Recent Labs    Lab 03/30/23  1628 03/30/23  1626 03/30/23  1623 03/27/23  0633 03/25/23  0818 03/24/23  1550 03/24/23  1101   WBC  --  10.0  --   --  9.0  --   --    HGB  --  12.0  --   --  9.8*  --  10.3*   MCV  --  97  --   --  97  --   --    PLT  --  499*  --  303 307   < >  --    INR  --  0.94  --   --   --   --   --    NA  --  131*  --   --  133*  --  137   POTASSIUM  --  4.1  --   --  4.1  --  3.5   CHLORIDE  --  95*  --   --  100  --   --    CO2  --  21*  --   --  20*  --   --    BUN  --  19.4  --   --  17.0  --   --    CR 1.5* 1.35*  1.35*  --   --  0.98*  --   --    ANIONGAP  --  15  --   --  13  --   --    DARRICK  --  9.6  --   --  8.3*  --   --    GLC  --  94 99  --  117*   < > 166*   ALBUMIN  --  3.9  --   --   --   --   --    PROTTOTAL  --  6.8  --   --   --   --   --    BILITOTAL  --  0.2  --   --   --   --   --    ALKPHOS  --  104  --   --   --   --   --    ALT  --  70*  --   --   --   --   --    AST  --  85*  --   --   --   --   --     < > = values in this interval not displayed.     Lab Results   Component Value Date    INR 0.94 03/30/2023    INR 1.03 10/05/2022     Recent Labs   Lab Test 03/30/23  1626   CTROPT 7     I personally reviewed the XRAY(s) listed below, including radiology images and corresponding radiologist reports if available.      I have personally reviewed the following data over the past 24 hrs:    10.0  \   12.0   / 499 (H)     131 (L) 95 (L) 19.4 /  94   4.1 21 (L) 1.5 (H) \       ALT: 70 (H) AST: 85 (H) AP: 104 TBILI: 0.2   ALB: 3.9 TOT PROTEIN: 6.8 LIPASE: N/A       Trop: 7 BNP: N/A       Procal: N/A CRP: 6.86 (H) Lactic Acid: 1.1; 1.1       INR:  0.94 PTT:  35   D-dimer:  1.64 (H) Fibrinogen:  N/A       Imaging results reviewed over the past 24 hrs:   Recent Results (from the past 24 hour(s))   XR Chest Port 1 View    Narrative    EXAM: XR CHEST PORT 1 VIEW  3/30/2023 4:48 PM     HISTORY:  sob       COMPARISON:   3/27/2023      FINDINGS:   The cardiac silhouette is within normal limits.  Small left pleural  effusion. No pneumothorax. Streaky bibasilar opacities. No acute  osseous abnormality. The visualized upper abdomen is unremarkable.      Impression    IMPRESSION:   1. Small left pleural effusion with associated basilar atelectasis.  2. Streaky right basilar opacity, likely atelectasis.     I have personally reviewed the examination and initial interpretation  and I agree with the findings.    DAYNA العراقي MD         SYSTEM ID:  [id number]   CT Chest Pulmonary Embolism w Contrast    Narrative    Examination:  CT CHEST PULMONARY EMBOLISM W CONTRAST 3/30/2023 9:37 PM      Comparison: Chest CT 3/16/2023    History: SOB    TECHNIQUE: Volumetric helical acquisition of CT images of the chest  from the lung apices to the kidneys were acquired in arterial phase  after the administration of IV contrast. Three-dimensional (3D)  post-processed angiographic images were reconstructed, archived to  PACS and used in interpretation of this study.     Contrast dose: iopamidol (ISOVUE-370) solution 65 mL    FINDINGS:  Chest:  There is adequate opacification of the main and lobar pulmonary  arteries. No filling defect in the lobar and main segmental pulmonary  arteries to suggest pulmonary embolism.  There is no right-sided heart  strain. The heart is normal. No pericardial effusion. Mild to moderate  coronary artery calcifications. Normal caliber esophagus. Normal  caliber thoracic vasculature. The central airways are patent. Mild  central bronchial wall thickening. Small left and trace right pleural  effusions with associated compressive atelectasis. Small left  pneumothorax. Scattered areas of smooth interlobular septal  thickening. No suspicious pulmonary nodules. No focal airspace  consolidations. Postsurgical changes of mediastinal lymph node  dissection and left lower lobectomy. Subcutaneous emphysema overlying  the left lateral chest wall and overlying the anterior chest wall    Upper abdomen:   No acute  "findings in the visualized upper abdomen.     Bones:  Multiple chronic left rib fractures. Degenerative changes spine. No  suspicious or aggressive appearing bone lesions.      Impression    IMPRESSION:   1. No evidence of acute pulmonary embolism.  2. Small left pneumothorax, likely residual from recent left lower  lobectomy.  3. Small left and trace right pleural effusions.  4. Scattered areas of smooth interlobular septal thickening,  suggestive of pulmonary edema.    Imaging findings discussed with Dr. Carroll by Dr. Lee Kline on 9:56  PM on 3/30/2023.    I have personally reviewed the examination and initial interpretation  and I agree with the findings.    DAYNA العراقي MD         SYSTEM ID:  [id number]       Color Urine (no units)   Date Value   03/30/2023 Straw     Appearance Urine (no units)   Date Value   03/30/2023 Clear     Glucose Urine (mg/dL)   Date Value   03/30/2023 Negative     Bilirubin Urine (no units)   Date Value   03/30/2023 Negative     Ketones Urine (mg/dL)   Date Value   03/30/2023 Trace (A)     Specific Gravity Urine (no units)   Date Value   03/30/2023 1.010     pH Urine (no units)   Date Value   03/30/2023 6.0     Protein Albumin Urine (mg/dL)   Date Value   03/30/2023 Negative     Nitrite Urine (no units)   Date Value   03/30/2023 Negative     Leukocyte Esterase Urine (no units)   Date Value   03/30/2023 Negative       ASSESSMENT / PROBLEM LIST  PRINCIPAL DIAGNOSIS: Feeling unwell    Pt is a 67 year old female with newly documented lung cancer - s/p robotic assisted wedge resection --- now with vague symptom(s) of \"not feeling well\" with lightheadedness.  Has underlying chronic kidney disease (CKD).  ? Mild volume decreased - seems reasonable to start with giving IV fluid(s) and repeating labs and ask the thoracic surgery to see her and I've initiated a new consult to the Oncology service - given the path report was a bit confusing to me ? Reference to carcinoid spot and that the " margins of the tumor was very close to the surgical margins.  ? Further adjuvant treatment to be discussed - I reviewed her chart and I could not find any discussion that she had with anyone regarding these additional treatments - she recalls being told that nothing more needed to be done.    TNM Descriptors  m (multiple primary tumors)    pT Category  pT2    pN Category  pN0    ADDITIONAL FINDINGS   Additional Findings  Inflammation: Chronic      Carcinoid tumorlet    .     LUNG, COMPLETION LEFT LOWER LOBECTOMY:  - Lung parenchyma with carcinoid tumorlet (0.4 cm)    Assessment & Plan   Principal Problem:    Feeling unwell - lightheaded, nausea, cough, chest pain, fatigue postoperative state  Active Problems:    Adenocarcinoma of left lung (H)    Anxiety    Essential hypertension    Hypothyroidism    Adenocarcinoma of left lung (H)  Robotic surgery 3/24/2023 - Dr. Tran Hays - wedge resection  Operative note:  Preoperative diagnosis: Cavitary nodule of left lower lobe of lung  Post-operative diagnosis: Adenocarcinoma    Pathology:  A. LUNG, LEFT LOWER LOBE WEDGE (FROZEN SECTION), EXCISION:  - INVASIVE MUCINOUS ADENOCARCINOMA  - Tumor size: 1.6 cm in greatest dimension, multifocal  - Visceral pleural invasion is identified  - Lymphovascular invasion is not identified  - Parenchymal resection margin is involved by tumor; Staple line not included ( see part L for final margin status)  - AJCC Pathologic Stage: pT2 (m), N0  L.  LUNG, COMPLETION LEFT LOWER LOBECTOMY:  - Lung parenchyma with carcinoid tumorlet (0.4 cm)      Anxiety  Prior to admission medication(s): Atarax  Plan:  Continue PRN    Essential hypertension  Prior to admission medication(s): clonidine TTS, [place]gro[place], losartan (Cozaar ), verapamil  Plan:  Pharmacy medication reconciliation pending - to continue medications as at home.    Hypothyroidism  Prior to admission medication(s): levothyroxine  Plan:  Continue same    Feeling unwell - lightheaded, nausea,  "cough, chest pain, fatigue postoperative state  Symptom(s) onset within 5 days of discharge from thoracic surgery service - work up in the ER unremarkable.    Additional diagnoses and notable risk factors for complications during hospitalization:  Clinically Significant Risk Factors                        # Overweight: Estimated body mass index is 26.71 kg/m  as calculated from the following:    Height as of this encounter: 1.689 m (5' 6.5\").    Weight as of this encounter: 76.2 kg (167 lb 15.9 oz)., PRESENT ON ADMISSION       PLAN / OTHER ORDERS ENTERED:    NONE OF MY ORDERS WILL RELEASE DESPITE THEY APPEAR TO BE RELEASED - ER NURSING STAFF \"SEE THEM\" HOWEVER CANNOT ACT ON THE ORDERS.  APPEARS THAT THE DISCHARGE DATE FROM HER LAST ADMISSION IS PLAYING SOME ROLE IN THIS COMPUTER ISSUE.  ER MD HAS BEEN ASKED TO ASSIST AND ENTER ORDERS ON MY PLAN.    1. Medication reconciliation by pharmacy  2. [employer] superuser to assist in releasing my orders --- the system will not allow my orders to release.  ER MD to assist and order IV fluid(s) and pain medications.  3. Oncology consult - to review pathology and confirm that no other treatment is indicated.  4. Thoracic surgery consult  5. Repeat labs      Admitted: Inpatient    Current disposition: Admit to the Adult Internal Medicine Service under Inpatient status    Scheduled and PRN medication(s):      Restraints: Not indicated    Discharge disposition: To be determined  Diet order:  Orders Placed This Encounter      Diet      Combination Diet Regular Diet Adult    DVT Prophylaxis: enoxaparin (Lovenox) SQ  Ayon Catheter: Not present  Lines: None     Cardiac Monitoring: ACTIVE order. Indication: Chest pain/ ACS rule out (24 hours)  Code Status:        Patient / Family Communication: I discussed the details of her diagnoses and my recommendations with the patient.    Alfie Regan MD   "

## 2023-03-31 NOTE — ASSESSMENT & PLAN NOTE
Symptom(s) onset within 5 days of discharge from thoracic surgery service - work up in the ER unremarkable.

## 2023-03-31 NOTE — ASSESSMENT & PLAN NOTE
Robotic surgery 3/24/2023 - Dr. Tran Hays - wedge resection  Operative note:  Preoperative diagnosis: Cavitary nodule of left lower lobe of lung  Post-operative diagnosis: Adenocarcinoma    Pathology:  A. LUNG, LEFT LOWER LOBE WEDGE (FROZEN SECTION), EXCISION:  - INVASIVE MUCINOUS ADENOCARCINOMA  - Tumor size: 1.6 cm in greatest dimension, multifocal  - Visceral pleural invasion is identified  - Lymphovascular invasion is not identified  - Parenchymal resection margin is involved by tumor; Staple line not included ( see part L for final margin status)  - AJCC Pathologic Stage: pT2 (m), N0  L.  LUNG, COMPLETION LEFT LOWER LOBECTOMY:  - Lung parenchyma with carcinoid tumorlet (0.4 cm)

## 2023-03-31 NOTE — DISCHARGE INSTRUCTIONS
Continue to check your blood pressure three times a day   If you are not eating solid food or if your systolic blood pressure is less than 160/90, hold your blood pressure medications  Once your resume eating regularly, you can resume your typical blood pressure medication regimen

## 2023-03-31 NOTE — ED NOTES
"Writer paged Hospitalist, Yunior Regan because there were no admission orders in for patient. Pt was requesting some pain medication around 0500. MD called and stated that he put orders in and writer informed MD that the orders are \"read only\" and from the last visit on 3/27 and no new orders are released. MD stated that he does not know how to change the orders from 3/27 and that writer should ask the ER doc to put in some orders.        "

## 2023-03-31 NOTE — H&P
"HISTORY AND PHYSICAL    [facility]    Hospitalist Service, GOLD TEAM     Patient Name: Sonny Rush  YOB: 1955  Age/Gender: 67 year old female  Medical Record Number: [id number]    Date of Admission: 3/24/2023  Primary Care Physician: Olga Wang    HISTORY AND EXAM     Location seen: Emergency Room  Date of service:  3/31/2023    The patient's history is provided by chart review and the patient and is compromised by nothing.    CHIEF COMPLAINT: \"I'm not feeling well\"    HISTORY OF PRESENT ILLNESS    The patient is a 67 year old female, with h/o essential hypertension who is 5 days out from discharge from [facility] after YAZMIN robotic wedge resection for lung cancer - adenocarcinoma, multifocal and ? Small nesting of carcinoid tumor ?? Per path report.  Has not had Oncology follow up or plans for that.  Patient was told that she will not need either chemotherapy or XRT (radiation therapy).    She comes to the ER with complaints of feeling very weak and tired and lightheadedness X 2 days associated with her postoperative(ly) chest pain and denies any associated short(ness) of breath, sputum production, fevers, chills, sweats, palpitations, abdominal pain or any other new or concerning symptom(s).  These symptom(s) are severe, almost causing her to pass out.  She denies any bloody stools or emesis.  Symptom(s) onset gradual over hours and associated with a dizziness sensation as well.    Location / Radiation: diffuse, nonradiating  Duration / Onset: a few days, gradual onset  Quality: vague - lightheadedness   Severity: severe, almost falling  Timing / Triggering event: nothing identifiable   What makes the symptoms worse: standing / walking  What makes the symptoms better: resting  Context: recent robotic YAZMIN wedge resection of lung cancer  Associated signs and Symptoms: severe fatigue  Additional " information and other related symptoms or pertinent negatives: see above  ---------------------------------------------------------------------------------------------------------------  The patient's presentation and clinical findings were reviewed and/or discussed with Dr. KENZIE ROTH. In addition, I reviewed the prior care documentation notes (e.g. ER staff / nursing, clinic, referring hospital, consultants) for this hospitalization.  ---------------------------------------------------------------------------------------------------------------    REVIEW OF SYSTEMS  The remainder of the 14 system / comprehensive review of systems (ROS) was reviewed with the patient and/or representative providing the history and is negative and otherwise as documented in my HPI above.    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      HTN (hypertension)      Hypothyroidism      Malignant neoplasm of left lung, unspecified part of lung (H)      Meniere's disease      Migraines     ON NEURONTIN, sees neurologist     Mild depression     sees psych     Osteopenia      Palpitations      PMB (postmenopausal bleeding) 01/01/2006    Had  D & C 2/06 showing inactive endometrium     Pulmonary nodules      Past Surgical History:   Procedure Laterality Date     BIOPSY BREAST SEED LOCALIZATION  7/3/2012    Procedure: BIOPSY BREAST SEED LOCALIZATION;  RIGHT BREAST LUMPECTOMY WITH ULTRASOUND SEED LOCALIZATION;  Surgeon: Jaclyn Dalal MD;  Location:  [facility]     BREAST BIOPSY, RT/LT  11/29/00    stereotactic bx right breast--fibrocystic change     COLONOSCOPY N/A 1/30/2017    Procedure: COMBINED COLONOSCOPY, SINGLE OR MULTIPLE BIOPSY/POLYPECTOMY BY BIOPSY;  Surgeon: Muriel Wolff MD;  Location:  GI     DAVINCI LOBECTOMY LUNG Left 3/24/2023    Procedure: LOBECTOMY, LUNG, ROBOT-ASSISTED- wedge resection, COMPLETION  LOWER LOBECTOMY, MEDIASTINAL LYMPH NODE DISSECTION;  Surgeon: Tran Hays MD;  Location: [facility] OR     DILATION AND CURETTAGE  2/27/06    inactive endometrium on path; done for PMB     ENT SURGERY      ear tubes, rhinoplasty     LAPAROSCOPY      Age 27 for pelvic pain     ORTHOPEDIC SURGERY      a[place]inertoe     Family History   Problem Relation Age of Onset     Hyperlipidemia Father      Hypertension Father      Heart Surgery Father      Anesthesia Reaction Father         delirium after CABG     Bone Cancer Paternal Grandmother         jaw bone cancer     No Known Problems Paternal Grandfather         Lung cancer     Breast Cancer Other         aunt     Ovarian Cancer Other      Uterine Cancer Other      Venous thrombosis No family hx of      Social History     Social History Narrative     -  is 10years younger.    Prior smoker.    Lives in [facility].    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    No children.     is chastity.     HOME MEDICATION(S): Home medication(s) personally reviewed as below.  Prior to Admission medications    Medication Sig Last Dose Taking? Auth Provider Long Term End Date   acetaminophen (TYLENOL) 325 MG tablet Take 2 tablets (650 mg) by mouth every 4 hours as needed for other (For optimal non-opioid multimodal pain management to improve pain control.)  Yes Tran Hays MD No    buPROPion (WELLBUTRIN XL) 150 MG 24 hr tablet Take 150 mg by mouth every morning 3/23/2023 Yes Reported, Patient     busPIRone (BUSPAR) 15 MG tablet Take 15 mg by mouth 2 times daily 3/24/2023 Yes Reported, Patient Yes    chlorthalidone (HYGROTON) 25 MG tablet Take 25 mg by mouth daily 3/24/2023 Yes Reported, Patient No    cloNIDine (CATAPRES-TTS1) 0.1 MG/24HR WK patch Place 1 patch onto the skin once a week Changes on Sat morning 3/18/2023 Yes Reported, Patient     enoxaparin ANTICOAGULANT (LOVENOX) 40 MG/0.4ML syringe Inject 0.4 mLs (40 mg) Subcutaneous daily for 7 days  Yes Tran Hays MD   4/3/23   gabapentin (NEURONTIN) 100 MG capsule Take 1 capsule (100 mg) by mouth nightly as needed for neuropathic pain  Yes Tran Hays MD Yes 4/10/23   levothyroxine (SYNTHROID/LEVOTHROID) 88 MCG tablet Take 88 mcg by mouth daily 3/24/2023 Yes Reported, Patient Yes    losartan (COZAAR) 100 MG tablet Take 100 mg by mouth daily 3/23/2023 Yes Reported, Patient No    methocarbamol (ROBAXIN) 500 MG tablet Take 1 tablet (500 mg) by mouth every 6 hours as needed for muscle spasms  Yes Tran Hays MD  4/10/23   mirtazapine (REMERON) 7.5 MG tablet Take 7.5 mg by mouth nightly as needed (insomnia)  Yes Reported, Patient No    ondansetron (ZOFRAN ODT) 8 MG ODT tab Take 8 mg by mouth every 8 hours as needed for nausea  Yes Unknown, Entered By History     oxyCODONE (ROXICODONE) 5 MG tablet Take 1 tablet (5 mg) by mouth every 6 hours as needed for severe pain (7-10)  Yes Tran Hays MD     polyethylene glycol (MIRALAX) 17 GM/Dose powder Take 17 g by mouth daily  Yes Tran Hays MD     rizatriptan (MAXALT-MLT) 10 MG ODT Take 1 tablet by mouth as needed  Yes Reported, Patient     senna-docusate (SENOKOT-S/PERICOLACE) 8.6-50 MG tablet Take 1 tablet by mouth 2 times daily  Yes Tran Hays MD     tiZANidine (ZANAFLEX) 2 MG tablet Take 1-3 tablets by mouth nightly as needed  Yes Reported, Patient     valACYclovir (VALTREX) 1000 mg tablet Take 1,000 mg by mouth 2 times daily as needed (cold sores)  Yes Unknown, Entered By History     verapamil ER (VERELAN) 180 MG 24 hr capsule Take 2 capsules (360 mg) by mouth At Bedtime 3/23/2023 Yes Shannon Cain, CNP Yes    Zoledronic Acid (RECLAST IV) Inject 5 mg into the vein once yearly 3/13/2023 Yes Unknown, Entered By History     folic acid (FOLVITE) 1 MG tablet Take 1 mg by mouth every other day  Patient not taking: Reported on 3/17/2023   Reported, Patient     HydrOXYzine Pamoate (VISTARIL PO) Take 25 mg by mouth as needed  Patient not taking: Reported on 2/9/2023    Reported, Patient       ALLERGIES  Codeine sulfate, Depakote, Niacin, and Prednisone    PHYSICAL EXAM  Vital signs reviewed as below.    No data found.  Body mass index is 26.71 kg/m .  The exam is compromised by nothing    GENERAL APPEARANCE: comfortable, conversant, no acute distress with occasional(ly) wincing in pain with coughing.  HEENT / EYES / EARS / OROPHARYNX: Normal external appearance of head, eyes, ears, and nose. Oropharynx with moist mucous membranes.   No signs of trauma to head or neck. Eyes nonicteric, conjunctiva clear.  NECK: Supple, no palpable lymphadenopathy, no masses, no thyromegaly. Neck veins not distended. ROM normal.  Trachea midline.  LUNGS: Clear to auscultation with good airflow. No wheezes, rales or rhonchi.  No stridor.  HEART / CV / CHEST: Normal S1S2. RRR without rubs, gallops or clicks. No murmur noted. No edema. No clubbing or cyanosis.  GI: Normal active bowel sounds. Soft and nontender.  No hepatosplenomegaly or palpable masses. No hernias appreciated.  HEME / LYMPH / SKIN / EXTREMITIES  SKIN: Exposed areas grossly normal, warm and dry without unusual signs of bleeding, bruising or petechiae. No rashes. No mottling.  EXTREMITIES / JOINTS / BACK: Normal range of motion grossly and moving all extremity(ies) to command. No significant deformities of systemic diseases or other abnormalities. Joints without active inflammation or findings of synovitis. Back / Buttocks region: not examined.  NEUROLOGIC EXAM  PSYCH / MENTAL STATUS: alert, oriented to person, place, and time. Insight, judgement and interactions with others appears normal.  CRANIAL NERVES: PERRL, EOM's intact. Normal fluent speech. Normal symmetric appearing face.  MOTOR: Grossly normal muscle tone and strength in all extremities.  SENSATION: Grossly normal in all extremity(ies) to light touch  CEREBELLAR: within normal limits on upper extremities coordination  GAIT: Not tested    LABS / XRAYS / RESULTS  Recent Labs    Lab 03/30/23  1628 03/30/23  1626 03/30/23  1623 03/27/23  0633 03/25/23  0818 03/24/23  1550 03/24/23  1101   WBC  --  10.0  --   --  9.0  --   --    HGB  --  12.0  --   --  9.8*  --  10.3*   MCV  --  97  --   --  97  --   --    PLT  --  499*  --  303 307   < >  --    INR  --  0.94  --   --   --   --   --    NA  --  131*  --   --  133*  --  137   POTASSIUM  --  4.1  --   --  4.1  --  3.5   CHLORIDE  --  95*  --   --  100  --   --    CO2  --  21*  --   --  20*  --   --    BUN  --  19.4  --   --  17.0  --   --    CR 1.5* 1.35*  --   --  0.98*  --   --    ANIONGAP  --  15  --   --  13  --   --    DARRICK  --  9.6  --   --  8.3*  --   --    GLC  --  94 99  --  117*   < > 166*   ALBUMIN  --  3.9  --   --   --   --   --    PROTTOTAL  --  6.8  --   --   --   --   --    BILITOTAL  --  0.2  --   --   --   --   --    ALKPHOS  --  104  --   --   --   --   --    ALT  --  70*  --   --   --   --   --    AST  --  85*  --   --   --   --   --     < > = values in this interval not displayed.     Lab Results   Component Value Date    INR 0.94 03/30/2023    INR 1.03 10/05/2022     Recent Labs   Lab Test 03/30/23  1626   CTROPT 7     I personally reviewed the XRAY(s) listed below, including radiology images and corresponding radiologist reports if available.      I have personally reviewed the following data over the past 24 hrs:    10.0  \   12.0   / 499 (H)     131 (L) 95 (L) 19.4 /  94   4.1 21 (L) 1.5 (H) \       ALT: 70 (H) AST: 85 (H) AP: 104 TBILI: 0.2   ALB: 3.9 TOT PROTEIN: 6.8 LIPASE: N/A       Trop: 7 BNP: N/A       Procal: N/A CRP: 6.86 (H) Lactic Acid: 1.1; 1.1       INR:  0.94 PTT:  35   D-dimer:  1.64 (H) Fibrinogen:  N/A       Imaging results reviewed over the past 24 hrs:   Recent Results (from the past 24 hour(s))   XR Chest Port 1 View    Narrative    EXAM: XR CHEST PORT 1 VIEW  3/30/2023 4:48 PM     HISTORY:  sob       COMPARISON:   3/27/2023      FINDINGS:   The cardiac silhouette is within normal limits. Small left  pleural  effusion. No pneumothorax. Streaky bibasilar opacities. No acute  osseous abnormality. The visualized upper abdomen is unremarkable.      Impression    IMPRESSION:   1. Small left pleural effusion with associated basilar atelectasis.  2. Streaky right basilar opacity, likely atelectasis.     I have personally reviewed the examination and initial interpretation  and I agree with the findings.    DAYNA العراقي MD         SYSTEM ID:  [id number]   CT Chest Pulmonary Embolism w Contrast    Narrative    Examination:  CT CHEST PULMONARY EMBOLISM W CONTRAST 3/30/2023 9:37 PM      Comparison: Chest CT 3/16/2023    History: SOB    TECHNIQUE: Volumetric helical acquisition of CT images of the chest  from the lung apices to the kidneys were acquired in arterial phase  after the administration of IV contrast. Three-dimensional (3D)  post-processed angiographic images were reconstructed, archived to  PACS and used in interpretation of this study.     Contrast dose: iopamidol (ISOVUE-370) solution 65 mL    FINDINGS:  Chest:  There is adequate opacification of the main and lobar pulmonary  arteries. No filling defect in the lobar and main segmental pulmonary  arteries to suggest pulmonary embolism.  There is no right-sided heart  strain. The heart is normal. No pericardial effusion. Mild to moderate  coronary artery calcifications. Normal caliber esophagus. Normal  caliber thoracic vasculature. The central airways are patent. Mild  central bronchial wall thickening. Small left and trace right pleural  effusions with associated compressive atelectasis. Small left  pneumothorax. Scattered areas of smooth interlobular septal  thickening. No suspicious pulmonary nodules. No focal airspace  consolidations. Postsurgical changes of mediastinal lymph node  dissection and left lower lobectomy. Subcutaneous emphysema overlying  the left lateral chest wall and overlying the anterior chest wall    Upper abdomen:   No acute findings in  "the visualized upper abdomen.     Bones:  Multiple chronic left rib fractures. Degenerative changes spine. No  suspicious or aggressive appearing bone lesions.      Impression    IMPRESSION:   1. No evidence of acute pulmonary embolism.  2. Small left pneumothorax, likely residual from recent left lower  lobectomy.  3. Small left and trace right pleural effusions.  4. Scattered areas of smooth interlobular septal thickening,  suggestive of pulmonary edema.    Imaging findings discussed with Dr. Carroll by Dr. Lee Kline on 9:56  PM on 3/30/2023.    I have personally reviewed the examination and initial interpretation  and I agree with the findings.    DAYNA العراقي MD         SYSTEM ID:  [id number]       Color Urine (no units)   Date Value   03/30/2023 Straw     Appearance Urine (no units)   Date Value   03/30/2023 Clear     Glucose Urine (mg/dL)   Date Value   03/30/2023 Negative     Bilirubin Urine (no units)   Date Value   03/30/2023 Negative     Ketones Urine (mg/dL)   Date Value   03/30/2023 Trace (A)     Specific Gravity Urine (no units)   Date Value   03/30/2023 1.010     pH Urine (no units)   Date Value   03/30/2023 6.0     Protein Albumin Urine (mg/dL)   Date Value   03/30/2023 Negative     Nitrite Urine (no units)   Date Value   03/30/2023 Negative     Leukocyte Esterase Urine (no units)   Date Value   03/30/2023 Negative       ASSESSMENT / PROBLEM LIST  PRINCIPAL DIAGNOSIS: Feeling unwell    Pt is a 67 year old female with newly documented lung cancer - s/p robotic assisted wedge resection --- now with vague symptom(s) of \"not feeling well\" with lightheadedness.  Has underlying chronic kidney disease (CKD).  ? Mild volume decreased - seems reasonable to start with giving IV fluid(s) and repeating labs and ask the thoracic surgery to see her and I've initiated a new consult to the Oncology service - given the path report was a bit confusing to me ? Reference to carcinoid spot and that the margins of the " tumor was very close to the surgical margins.  ? Further adjuvant treatment to be discussed - I reviewed her chart and I could not find any discussion that she had with anyone regarding these additional treatments - she recalls being told that nothing more needed to be done.    TNM Descriptors  m (multiple primary tumors)    pT Category  pT2    pN Category  pN0    ADDITIONAL FINDINGS   Additional Findings  Inflammation: Chronic      Carcinoid tumorlet    .     LUNG, COMPLETION LEFT LOWER LOBECTOMY:  - Lung parenchyma with carcinoid tumorlet (0.4 cm)    Assessment & Plan   Principal Problem:    Feeling unwell - lightheaded, nausea, cough, chest pain, fatigue postoperative state  Active Problems:    Adenocarcinoma of left lung (H)    Anxiety    Essential hypertension    Hypothyroidism    Adenocarcinoma of left lung (H)  Robotic surgery 3/24/2023 - Dr. Tran Hays - wedge resection  Operative note:  Preoperative diagnosis: Cavitary nodule of left lower lobe of lung  Post-operative diagnosis: Adenocarcinoma    Pathology:  A. LUNG, LEFT LOWER LOBE WEDGE (FROZEN SECTION), EXCISION:  - INVASIVE MUCINOUS ADENOCARCINOMA  - Tumor size: 1.6 cm in greatest dimension, multifocal  - Visceral pleural invasion is identified  - Lymphovascular invasion is not identified  - Parenchymal resection margin is involved by tumor; Staple line not included ( see part L for final margin status)  - AJCC Pathologic Stage: pT2 (m), N0  L.  LUNG, COMPLETION LEFT LOWER LOBECTOMY:  - Lung parenchyma with carcinoid tumorlet (0.4 cm)      Anxiety  Prior to admission medication(s): Atarax  Plan:  Continue PRN    Essential hypertension  Prior to admission medication(s): clonidine TTS, [place]gro[place], losartan (Cozaar ), verapamil  Plan:  Pharmacy medication reconciliation pending - to continue medications as at home.    Hypothyroidism  Prior to admission medication(s): levothyroxine  Plan:  Continue same    Feeling unwell - lightheaded, nausea, cough, chest  "pain, fatigue postoperative state  Symptom(s) onset within 5 days of discharge from thoracic surgery service - work up in the ER unremarkable.    Additional diagnoses and notable risk factors for complications during hospitalization:  Clinically Significant Risk Factors                        # Overweight: Estimated body mass index is 26.71 kg/m  as calculated from the following:    Height as of this encounter: 1.689 m (5' 6.5\").    Weight as of this encounter: 76.2 kg (167 lb 15.9 oz)., PRESENT ON ADMISSION       PLAN / OTHER ORDERS ENTERED:    NONE OF MY ORDERS WILL RELEASE DESPITE THEY APPEAR TO BE RELEASED - ER NURSING STAFF \"SEE THEM\" HOWEVER CANNOT ACT ON THE ORDERS.  APPEARS THAT THE DISCHARGE DATE FROM HER LAST ADMISSION IS PLAYING SOME ROLE IN THIS COMPUTER ISSUE.  ER MD HAS BEEN ASKED TO ASSIST AND ENTER ORDERS ON MY PLAN.    1. Medication reconciliation by pharmacy  2. [employer] superuser to assist in releasing my orders --- the system will not allow my orders to release.  ER MD to assist and order IV fluid(s) and pain medications.  3. Oncology consult - to review pathology and confirm that no other treatment is indicated.  4. Thoracic surgery consult  5. Repeat labs      Admitted: Inpatient    Current disposition: Admit to the Adult Internal Medicine Service under Inpatient status    Scheduled and PRN medication(s):  enoxaparin ANTICOAGULANT, 40 mg, Subcutaneous, Q24H  sodium chloride (PF), 3 mL, Intracatheter, Q8H    acetaminophen, 975 mg, Q8H PRN  bisacodyl, 5 mg, Daily PRN   Or  bisacodyl, 10 mg, Daily PRN  bisacodyl, 10 mg, Daily PRN  calcium carbonate, 1,000 mg, 4x Daily PRN  HYDROmorphone, 0.5 mg, Q2H PRN  labetalol, 100-200 mg, Q6H PRN  lidocaine 4%, , Q1H PRN  lidocaine (buffered or not buffered), 0.1-1 mL, Q1H PRN  melatonin, 1 mg, At Bedtime PRN  naloxone, 0.2 mg, Q2 Min PRN   Or  naloxone, 0.4 mg, Q2 Min PRN   Or  naloxone, 0.2 mg, Q2 Min PRN   Or  naloxone, 0.4 mg, Q2 Min PRN  ondansetron, 4 " mg, Q6H PRN   Or  ondansetron, 4 mg, Q6H PRN  oxyCODONE, 5-10 mg, Q4H PRN  polyethylene glycol, 17 g, Daily PRN  prochlorperazine, 5 mg, Q6H PRN   Or  prochlorperazine, 5 mg, Q6H PRN   Or  prochlorperazine, 12.5 mg, Q12H PRN  sodium chloride (PF), 3 mL, q1 min prn  sodium phosphate, 1 enema, Once PRN        Restraints: Not indicated    Discharge disposition: To be determined  Diet order:  Orders Placed This Encounter      Diet      Combination Diet Regular Diet Adult    DVT Prophylaxis: enoxaparin (Lovenox) SQ  Ayon Catheter: Not present  Lines: None     Cardiac Monitoring: ACTIVE order. Indication: Chest pain/ ACS rule out (24 hours)  Code Status: Full Code      Patient / Family Communication: I discussed the details of her diagnoses and my recommendations with the patient.    Alfie Regan MD

## 2023-03-31 NOTE — DISCHARGE SUMMARY
[facility] [place]  Hospitalist Discharge Summary      Date of Admission:  3/30/2023  Date of Discharge:  3/31/2023  Discharging Provider: Yaz Jarvis MD  Discharge Service: Hospitalist Service, GOLD TEAM 7    Discharge Diagnoses    RADHA on CKD  Hypotension  Hypothyroidism  Anxiety  Adenocarcinoma of left lung    Follow-ups Needed After Discharge   Follow-up Appointments     Follow Up and recommended labs and tests      Follow up with your Nephrology         Follow up with you Nephrologist     Unresulted Labs Ordered in the Past 30 Days of this Admission     Date and Time Order Name Status Description    3/31/2023 11:00 AM Comprehensive metabolic panel In process     3/31/2023 11:00 AM CBC with platelets In process     3/31/2023  7:15 AM Blood Culture Hand, Left In process     3/30/2023  4:30 PM Blood Culture Peripheral Blood Preliminary       These results will be followed up by Nephrologist     Discharge Disposition   Discharged to home  Condition at discharge: Stable      Hospital Course   Patient is a 67-year-old woman with HTN, CKD, hypothyroidism, migraines with history of meningioma, Ménière's disease, MDD, Hx of DCIS, NSCLC s/p left upper lobe robotic wedge resection 3/24/2023.  He presented to the ED complaining of weakness, tiredness, and lightheadedness for the past 2 days.  Through further questioning she reports that her p.o. intake has not returned to her baseline prior to the procedure; however, she has continued to take all of her blood pressure medications as prescribed.  Additionally she was taking opiates at bedtime to manage her pain.  It was after 2 to 3 days of this that she began to feel bad.  She reports lightheadedness, dizziness, changes in her vision, shortness of breath, and worsening numbness and tingling in her in her legs.  Additionally she reports less than usual urine output.  They finally presented to the ED when her blood pressures  were in the 90s/50s.  In the ED, after fluid resuscitation and holding her blood pressure medications for 24 hours all of her symptoms resolved and she felt back to her baseline.  Objectively her rising creatinine is likely prerenal due to poor p.o. intake and tight blood pressure control.  In addition to the maintenance IV fluids, she was bolused 500 cc of IV fluids prior to discharge.  Her final blood pressure prior to discharge was 141/81.  The patient felt safe for discharge with a plan to continue checking her blood pressure 3 times a day and holding her BP meds for BP less than 160/90 and if she continues to have poor p.o. intake.  She will resume her blood pressure medication once her p.o. intake improves or if her blood pressure is greater than 160/90.      Consultations This Hospital Stay   ONCOLOGY ADULT IP CONSULT  THORACIC SURGERY ADULT IP CONSULT    Code Status   Full Code    Time Spent on this Encounter   IYaz MD, personally saw the patient today and spent greater than 30 minutes discharging this patient.       Yaz Jarvis MD  [facility] EMERGENCY DEPARTMENT  [address] [facility] [address]  Phone: [phone]  ______________________________________________________________________    Physical Exam   Vital Signs: Temp: 98.1  F (36.7  C) Temp src: Oral BP: (!) 141/81 Pulse: 79   Resp: 16 SpO2: 99 % O2 Device: None (Room air) Oxygen Delivery: 2 LPM  Weight: 0 lbs 0 oz  Physical Exam  Vitals reviewed.   Constitutional:       General: She is awake.      Appearance: She is ill-appearing.   Pulmonary:      Effort: Pulmonary effort is normal. No respiratory distress.   Neurological:      Mental Status: She is alert and oriented to person, place, and time. Mental status is at baseline.   Psychiatric:         Behavior: Behavior normal.         Thought Content: Thought content normal.         Judgment: Judgment normal.          Primary Care Physician   Olga SANDERSON  Sergiokel    Discharge Orders      Reason for your hospital stay    Hypotension     Activity    Your activity upon discharge: activity as tolerated     Follow Up and recommended labs and tests    Follow up with your Nephrology     Diet    Follow this diet upon discharge: Orders Placed This Encounter      Combination Diet Regular Diet Adult       Significant Results and Procedures   Results for orders placed or performed during the hospital encounter of 03/30/23   XR Chest Port 1 View    Narrative    EXAM: XR CHEST PORT 1 VIEW  3/30/2023 4:48 PM     HISTORY:  sob       COMPARISON:   3/27/2023      FINDINGS:   The cardiac silhouette is within normal limits. Small left pleural  effusion. No pneumothorax. Streaky bibasilar opacities. No acute  osseous abnormality. The visualized upper abdomen is unremarkable.      Impression    IMPRESSION:   1. Small left pleural effusion with associated basilar atelectasis.  2. Streaky right basilar opacity, likely atelectasis.     I have personally reviewed the examination and initial interpretation  and I agree with the findings.    DAYNA العراقي MD         SYSTEM ID:  [id number]   CT Chest Pulmonary Embolism w Contrast    Narrative    Examination:  CT CHEST PULMONARY EMBOLISM W CONTRAST 3/30/2023 9:37 PM      Comparison: Chest CT 3/16/2023    History: SOB    TECHNIQUE: Volumetric helical acquisition of CT images of the chest  from the lung apices to the kidneys were acquired in arterial phase  after the administration of IV contrast. Three-dimensional (3D)  post-processed angiographic images were reconstructed, archived to  PACS and used in interpretation of this study.     Contrast dose: iopamidol (ISOVUE-370) solution 65 mL    FINDINGS:  Chest:  There is adequate opacification of the main and lobar pulmonary  arteries. No filling defect in the lobar and main segmental pulmonary  arteries to suggest pulmonary embolism.  There is no right-sided heart  strain. The heart is normal. No  pericardial effusion. Mild to moderate  coronary artery calcifications. Normal caliber esophagus. Normal  caliber thoracic vasculature. The central airways are patent. Mild  central bronchial wall thickening. Small left and trace right pleural  effusions with associated compressive atelectasis. Small left  pneumothorax. Scattered areas of smooth interlobular septal  thickening. No suspicious pulmonary nodules. No focal airspace  consolidations. Postsurgical changes of mediastinal lymph node  dissection and left lower lobectomy. Subcutaneous emphysema overlying  the left lateral chest wall and overlying the anterior chest wall    Upper abdomen:   No acute findings in the visualized upper abdomen.     Bones:  Multiple chronic left rib fractures. Degenerative changes spine. No  suspicious or aggressive appearing bone lesions.      Impression    IMPRESSION:   1. No evidence of acute pulmonary embolism.  2. Small left pneumothorax, likely residual from recent left lower  lobectomy.  3. Small left and trace right pleural effusions.  4. Scattered areas of smooth interlobular septal thickening,  suggestive of pulmonary edema.    Imaging findings discussed with Dr. Carroll by Dr. Lee Kline on 9:56  PM on 3/30/2023.    I have personally reviewed the examination and initial interpretation  and I agree with the findings.    DAYNA العراقي MD         SYSTEM ID:  [id number]       Discharge Medications   Current Discharge Medication List      CONTINUE these medications which have NOT CHANGED    Details   acetaminophen (TYLENOL) 325 MG tablet Take 2 tablets (650 mg) by mouth every 4 hours as needed for other (For optimal non-opioid multimodal pain management to improve pain control.)    Associated Diagnoses: Lung nodule      buPROPion (WELLBUTRIN XL) 150 MG 24 hr tablet Take 150 mg by mouth every morning      busPIRone (BUSPAR) 15 MG tablet Take 15 mg by mouth 2 times daily      chlorthalidone (HYGROTON) 25 MG tablet Take 25 mg by  mouth daily      cloNIDine (CATAPRES-TTS1) 0.1 MG/24HR WK patch Place 1 patch onto the skin once a week Changes on Sat morning      enoxaparin ANTICOAGULANT (LOVENOX) 40 MG/0.4ML syringe Inject 0.4 mLs (40 mg) Subcutaneous daily for 7 days  Qty: 2.8 mL, Refills: 0    Associated Diagnoses: Lung nodule      gabapentin (NEURONTIN) 100 MG capsule Take 1 capsule (100 mg) by mouth nightly as needed for neuropathic pain  Qty: 14 capsule, Refills: 0    Associated Diagnoses: Lung nodule      HydrOXYzine Pamoate (VISTARIL PO) Take 25 mg by mouth as needed      levothyroxine (SYNTHROID/LEVOTHROID) 88 MCG tablet Take 88 mcg by mouth daily      losartan (COZAAR) 100 MG tablet Take 100 mg by mouth daily      methocarbamol (ROBAXIN) 500 MG tablet Take 1 tablet (500 mg) by mouth every 6 hours as needed for muscle spasms  Qty: 56 tablet, Refills: 0    Associated Diagnoses: Lung nodule      mirtazapine (REMERON) 7.5 MG tablet Take 7.5 mg by mouth nightly as needed (insomnia)      ondansetron (ZOFRAN ODT) 8 MG ODT tab Take 8 mg by mouth every 8 hours as needed for nausea      oxyCODONE (ROXICODONE) 5 MG tablet Take 1 tablet (5 mg) by mouth every 6 hours as needed for severe pain (7-10)  Qty: 28 tablet, Refills: 0    Associated Diagnoses: Lung nodule      polyethylene glycol (MIRALAX) 17 GM/Dose powder Take 17 g by mouth daily  Qty: 510 g    Associated Diagnoses: Lung nodule      rizatriptan (MAXALT-MLT) 10 MG ODT Take 1 tablet by mouth as needed      senna-docusate (SENOKOT-S/PERICOLACE) 8.6-50 MG tablet Take 1 tablet by mouth 2 times daily    Associated Diagnoses: Lung nodule      tiZANidine (ZANAFLEX) 2 MG tablet Take 1-3 tablets by mouth nightly as needed      valACYclovir (VALTREX) 1000 mg tablet Take 1,000 mg by mouth 2 times daily as needed (cold sores)      verapamil ER (VERELAN) 180 MG 24 hr capsule Take 2 capsules (360 mg) by mouth At Bedtime  Qty: 180 capsule, Refills: 3    Associated Diagnoses: Essential hypertension;  Palpitations      Zoledronic Acid (RECLAST IV) Inject 5 mg into the vein once yearly         STOP taking these medications       folic acid (FOLVITE) 1 MG tablet Comments:   Reason for Stopping:             Allergies   Allergies   Allergen Reactions     Codeine Sulfate      Cough syrup  Hyperactive, anxiety     Depakote      Pill form caused GI disturbance.      Niacin Itching     Prednisone Anxiety

## 2023-03-31 NOTE — ED NOTES
Writer went into check on pt. Pt appeared anxious and writer saw that there was no admission orders in at this time. Writer asked ER doc for anxiety medication for pt.

## 2023-03-31 NOTE — PLAN OF CARE
Goal Outcome Evaluation:    Temp: 98.1  F (36.7  C) Temp src: Oral BP: 138/89 Pulse: 84   Resp: 16 SpO2: 99 % O2 Device: None (Room air)     Gave Zofran and dilaudid for nausea and left chest pain. Reports no appetite. VSS. On RA. Waiting to see Cardiology and Oncology teams. D5NS infusing at 100ml/hr.  at bedside. Pt ambulates to the bathroom with 1 assist. C/O jittery and has frequent dry cough with activity

## 2023-03-31 NOTE — PLAN OF CARE
Goal Outcome Evaluation:    Pt discharged to home with  at 2:15pm. Discharge paperwork reviewed. PIV x2 removed. All belongings sent with Pt. Gave oxycodone 5mg for prior to discharge per Pt requests

## 2023-03-31 NOTE — CONSULTS
Assessment and plan:    1) NSCLC:  Currently there is no need for additional adjuvant therapy, but I will discuss with my colleagues next Tuesday at [facility].  The margin was close to tumor edge at 1 mm so I will review this with Dr. Hays, but even so no additional chemotherapy ir targeted therapy will be recommended.  I discussed this with the patient and her .  Current NCCN guidelines suggest twice yearly surveillance CT scans for 3 to 4 years, then annual scans thereafter.  The patient would like to obtain this follow up through our clinic and I will ask our schedulers to have her see me in the next 4 to 8 weeks with a CT CAP, CBC, CMP, and brain MRI.    2) Fatigue:  Her current symptoms would be consistent with some post-operative fatigue, but it is not certain.  Given her history of Meneire's disease, it may be related to that.  In addition, she apparently has a hx of meningioma.  If she stays at the hospital, an MRI of the brain would be appropriate to evaluate for brain mets.  Otherwise, if she goes home we can do it as an outpatient and I will follow up on the results.    3) Meningioma:  Most likely non-contributory and not a problem at this point.  However, the brain MRI will reassess this and I will follow up on this history as outpatient.    CC:  NSCLC T2N0 (adenocarcinoma)    HPI: Ms Rush experienced a fall at home in Sept 2022 for which she presented to hospital. She underwent CT scan which identified a left lower lung cavitary nodule. She reports a dry cough which she has has for the last 2 years.  On March 24, 2023 she underwent a robotic resection by Dr. Hays which revealed invasive mucinous adenocarcinoma.  The tumor size was 1.6 cm in greatest dimension,however  Multifocal disease was identified with a single tumorlet in the resction specimen.  Visceral pleural invasion was identified, but no lymphovascular invasion was not identified.  AJCC Pathologic Stage: pT2 (m), N0.   Molecular studies are pending.  PD-L1 was less than 1%.    Today's imaging shows no recurrence, PE, or pneumothorax.    ROS:    The patient identifies very weak and tired and lightheadedness X 2 days     LAB:    CBC RESULTS: Recent Labs   Lab Test 03/30/23  1626   WBC 10.0   RBC 3.79*   HGB 12.0   HCT 36.9   MCV 97   MCH 31.7   MCHC 32.5   RDW 12.5   *     Last Comprehensive Metabolic Panel:  Sodium   Date Value Ref Range Status   03/30/2023 131 (L) 136 - 145 mmol/L Final   02/07/2007 142 133 - 144 mmol/L Final     Potassium   Date Value Ref Range Status   03/30/2023 4.1 3.4 - 5.3 mmol/L Final   12/13/2022 3.7 3.4 - 5.3 mmol/L Final   02/07/2007 3.2 (L) 3.4 - 5.3 mmol/L Final     Potassium POCT   Date Value Ref Range Status   03/24/2023 3.5 3.5 - 5.0 mmol/L Final     Chloride   Date Value Ref Range Status   03/30/2023 95 (L) 98 - 107 mmol/L Final   12/13/2022 94 94 - 109 mmol/L Final   02/07/2007 106 94 - 109 mmol/L Final     Chloride (External)   Date Value Ref Range Status   10/28/2022 96 96 - 106 mmol/L Final     Carbon Dioxide   Date Value Ref Range Status   02/07/2007 28 20 - 32 mmol/L Final     Carbon Dioxide (CO2)   Date Value Ref Range Status   03/30/2023 21 (L) 22 - 29 mmol/L Final   12/13/2022 28 20 - 32 mmol/L Final     Anion Gap   Date Value Ref Range Status   03/30/2023 15 7 - 15 mmol/L Final   12/13/2022 8 3 - 14 mmol/L Final   02/07/2007 8 6 - 17 mmol/L Final     Glucose   Date Value Ref Range Status   03/30/2023 94 70 - 99 mg/dL Final   12/13/2022 110 (H) 70 - 99 mg/dL Final   02/07/2007 90 60 - 110 mg/dL Final     GLUCOSE BY METER POCT   Date Value Ref Range Status   03/30/2023 99 70 - 99 mg/dL Final     Urea Nitrogen   Date Value Ref Range Status   03/30/2023 19.4 8.0 - 23.0 mg/dL Final   12/13/2022 22 7 - 30 mg/dL Final   02/07/2007 15 7 - 30 mg/dL Final     Creatinine   Date Value Ref Range Status   03/30/2023 1.35 (H) 0.51 - 0.95 mg/dL Final   03/30/2023 1.35 (H) 0.51 - 0.95 mg/dL Final    02/07/2007 1.13 0.60 - 1.30 mg/dL Final     Creatinine POCT   Date Value Ref Range Status   03/30/2023 1.5 (H) 0.5 - 1.0 mg/dL Final     GFR Estimate   Date Value Ref Range Status   03/30/2023 42 (L) >60 mL/min/1.73m2 Final     Comment:     eGFR calculated using 2021 CKD-EPI equation.   03/30/2023 42 (L) >60 mL/min/1.73m2 Final     Comment:     eGFR calculated using 2021 CKD-EPI equation.  eGFR calculated using 2021 CKD-EPI equation.   02/07/2007 54 (L) >60 mL/min/1.7m2 Final     GFR, ESTIMATED POCT   Date Value Ref Range Status   03/30/2023 37 (L) >60 mL/min/1.73m2 Final     Calcium   Date Value Ref Range Status   03/30/2023 9.6 8.8 - 10.2 mg/dL Final   02/07/2007 10.2 8.5 - 10.4 mg/dL Final     Bilirubin Total   Date Value Ref Range Status   03/30/2023 0.2 <=1.2 mg/dL Final   02/07/2007 0.4 0.2 - 1.3 mg/dL Final     Alkaline Phosphatase   Date Value Ref Range Status   03/30/2023 104 35 - 104 U/L Final   02/07/2007 88 40 - 150 U/L Final     ALT   Date Value Ref Range Status   03/30/2023 70 (H) 10 - 35 U/L Final   02/07/2007 33 0 - 50 U/L Final     AST   Date Value Ref Range Status   03/30/2023 85 (H) 10 - 35 U/L Final   02/07/2007 29 0 - 45 U/L Final     IMAGING:    Recent Results (from the past 744 hour(s))   CT Chest w/o contrast    Narrative    EXAM: CT CHEST W/O CONTRAST  LOCATION: [facility]  DATE/TIME: 3/16/2023 8:40 AM    INDICATION: Preoperative imaging for cavitary nodule of left lower lobe scheduled for 3/24/2023 with Dr. Hays.  COMPARISON: 01/09/2023.  TECHNIQUE: CT chest without IV contrast. Multiplanar reformats were obtained. Dose reduction techniques were used.  CONTRAST: None.    FINDINGS:   LUNGS AND PLEURA: Cavitary nodule in the periphery of the posterolateral left lower lobe likely abuts the pleura measuring 1.9 cm. It is more rounded and less elongated in this view but not significantly changed since the comparison study. Multiple   additional nodules measuring up to  4.8 cm in the central left upper lobe image 88 are not significantly changed since the comparison study.    MEDIASTINUM/AXILLAE: No lymphadenopathy. No thoracic aortic aneurysm.    CORONARY ARTERY CALCIFICATION: Moderate.    UPPER ABDOMEN: No acute findings.    MUSCULOSKELETAL: No frankly destructive bony lesions.      Impression    IMPRESSION:   1.  No significant change in cavitary nodule in the posterolateral left lower lobe since the comparison study.     XR Chest Port 1 View    Narrative    EXAM: XR CHEST PORT 1 VIEW  3/24/2023 1:26 PM      HISTORY: Postop    COMPARISON: Chest CT 3/16/2023, chest x-ray 2/7/2007    FINDINGS: Portable semiupright AP radiograph of the chest.  Postsurgical changes of left lower lobe which resection. Left apical  chest tube in place. The trachea is midline. The cardiac silhouette is  not enlarged. Small left pleural effusion. Mild lucency in the medial  left lung base. Mild biapical scarring. Mild subcutaneous emphysema of  the left chest wall. The visualized upper abdomen is unremarkable.      Impression    IMPRESSION:   1. New postsurgical changes of left lower lobe wedge resection.  Lucency of the medial left lung base likely represents small  pneumothorax. Left apical chest tube in place.  2. Small left pleural effusion.    I have personally reviewed the examination and initial interpretation  and I agree with the findings.    JATINDER RIGGS MD         SYSTEM ID:  [id number]   XR Chest Port 1 View    Narrative    EXAM: XR CHEST PORT 1 VIEW  3/25/2023 6:25 AM     HISTORY:  s/p LL lobectomy       COMPARISON:  3/24/2023    FINDINGS:   Stable postsurgical changes of the chest. Left apical chest tube in  place. Tiny left apical pneumothorax. The cardiac silhouette is  unchanged. No significant pleural effusion. Increased bilateral  perihilar and bibasilar opacities. Small amount of subcutaneous  emphysema overlying the left lateral chest wall and left neck.      Impression     IMPRESSION:   1. Left chest tube in place with small left apical pneumothorax. Left  chest wall subcutaneous emphysema  2. Increased bilateral perihilar and bibasilar opacities, atelectasis  and/or pulmonary edema.    I have personally reviewed the examination and initial interpretation  and I agree with the findings.    AUGIE HANNAH MD         SYSTEM ID:  [id number]   XR Chest Port 1 View    Narrative    EXAM: XR CHEST PORT 1 VIEW  3/26/2023 6:20 AM     HISTORY:  s/p LL lobectomy       COMPARISON:  3/25/2023    FINDINGS:   Stable postsurgical changes of the chest. Decreased trace left apical  pneumothorax with left chest tube place. The cardiac silhouette is at  the upper limits of normal for size. Small amount of subcutaneous  emphysema overlying the left lateral chest wall and left neck.  Decreased lung volumes. Slightly increased bilateral perihilar and  bibasilar opacities.      Impression    IMPRESSION:   1. Decreased trace left apical pneumothorax.  2. Decreased lung volumes slightly increased left perihilar and  basilar opacities.    I have personally reviewed the examination and initial interpretation  and I agree with the findings.    KRISTY ARIZMENDI MD         SYSTEM ID:  [id number]   XR Chest Port 1 View    Narrative    EXAM:  XR CHEST PORT 1 VIEW    INDICATION: s/p LL lobectomy    COMPARISON:  Chest x-ray 3/26/2023 at 6:18 AM    FINDINGS:  Single AP view of the chest. Left apically pointed chest tube.    Cardiomediastinal silhouette within normal limits.  Asymmetric left  hemithorax volume loss. Bilateral perihilar and basilar opacities. No  pneumothorax.  No pleural effusion.  Unremarkable upper abdomen. No  acute bony lesions. Subcutaneous emphysema along the left neck and  lateral chest.      Impression    IMPRESSION:  1.  No pneumothorax. Left chest tube in place.  2.  Stable bilateral perihilar and basilar opacities, likely edema  and/or atelectasis.    I have personally reviewed the  examination and initial interpretation  and I agree with the findings.    ZEFERINO ALFORD MD         SYSTEM ID:  [id number]   XR Chest Port 1 View    Narrative    Portable chest 3/27/2023 at 1040 hours    INDICATION: Postleft chest tube removal postoperative from left lower  lobectomy    COMPARISON: 0555 hours earlier today    FINDINGS: Interval removal of left apically directed thoracostomy  tube. Left chest wall subcutaneous emphysema is mild. Patient is not  upright. Possible trace left apical pneumothorax noted as an interval  change from previous. Heart size normal.      Impression    IMPRESSION: Removal left thoracostomy tube with possible trace left  apical pneumothorax. Recent left lung surgery.    ASHLEY GRAHAM MD         SYSTEM ID:  [id number]   XR Chest Port 1 View    Narrative    EXAM: XR CHEST PORT 1 VIEW  3/30/2023 4:48 PM     HISTORY:  sob       COMPARISON:   3/27/2023      FINDINGS:   The cardiac silhouette is within normal limits. Small left pleural  effusion. No pneumothorax. Streaky bibasilar opacities. No acute  osseous abnormality. The visualized upper abdomen is unremarkable.      Impression    IMPRESSION:   1. Small left pleural effusion with associated basilar atelectasis.  2. Streaky right basilar opacity, likely atelectasis.     I have personally reviewed the examination and initial interpretation  and I agree with the findings.    DAYNA العراقي MD         SYSTEM ID:  [id number]   CT Chest Pulmonary Embolism w Contrast    Narrative    Examination:  CT CHEST PULMONARY EMBOLISM W CONTRAST 3/30/2023 9:37 PM      Comparison: Chest CT 3/16/2023    History: SOB    TECHNIQUE: Volumetric helical acquisition of CT images of the chest  from the lung apices to the kidneys were acquired in arterial phase  after the administration of IV contrast. Three-dimensional (3D)  post-processed angiographic images were reconstructed, archived to  PACS and used in interpretation of this study.     Contrast  dose: iopamidol (ISOVUE-370) solution 65 mL    FINDINGS:  Chest:  There is adequate opacification of the main and lobar pulmonary  arteries. No filling defect in the lobar and main segmental pulmonary  arteries to suggest pulmonary embolism.  There is no right-sided heart  strain. The heart is normal. No pericardial effusion. Mild to moderate  coronary artery calcifications. Normal caliber esophagus. Normal  caliber thoracic vasculature. The central airways are patent. Mild  central bronchial wall thickening. Small left and trace right pleural  effusions with associated compressive atelectasis. Small left  pneumothorax. Scattered areas of smooth interlobular septal  thickening. No suspicious pulmonary nodules. No focal airspace  consolidations. Postsurgical changes of mediastinal lymph node  dissection and left lower lobectomy. Subcutaneous emphysema overlying  the left lateral chest wall and overlying the anterior chest wall    Upper abdomen:   No acute findings in the visualized upper abdomen.     Bones:  Multiple chronic left rib fractures. Degenerative changes spine. No  suspicious or aggressive appearing bone lesions.      Impression    IMPRESSION:   1. No evidence of acute pulmonary embolism.  2. Small left pneumothorax, likely residual from recent left lower  lobectomy.  3. Small left and trace right pleural effusions.  4. Scattered areas of smooth interlobular septal thickening,  suggestive of pulmonary edema.    Imaging findings discussed with Dr. Carroll by Dr. Lee Kline on 9:56  PM on 3/30/2023.    I have personally reviewed the examination and initial interpretation  and I agree with the findings.    DAYNA العراقي MD         SYSTEM ID:  [id number]     MED:    Current Facility-Administered Medications   Medication     dextrose 5% and 0.9% NaCl infusion     enoxaparin ANTICOAGULANT (LOVENOX) injection 40 mg     HYDROmorphone (PF) (DILAUDID) injection 0.5 mg     lidocaine (LMX4) cream     lidocaine 1 %  0.1-1 mL     melatonin tablet 1 mg     naloxone (NARCAN) injection 0.2 mg    Or     naloxone (NARCAN) injection 0.4 mg    Or     naloxone (NARCAN) injection 0.2 mg    Or     naloxone (NARCAN) injection 0.4 mg     ondansetron (ZOFRAN ODT) ODT tab 4 mg    Or     ondansetron (ZOFRAN) injection 4 mg     oxyCODONE (ROXICODONE) tablet 5-10 mg     prochlorperazine (COMPAZINE) injection 5 mg    Or     prochlorperazine (COMPAZINE) tablet 5 mg    Or     prochlorperazine (COMPAZINE) suppository 12.5 mg     sodium chloride (PF) 0.9% PF flush 3 mL     sodium chloride (PF) 0.9% PF flush 3 mL     Current Outpatient Medications   Medication     acetaminophen (TYLENOL) 325 MG tablet     buPROPion (WELLBUTRIN XL) 150 MG 24 hr tablet     busPIRone (BUSPAR) 15 MG tablet     chlorthalidone (HYGROTON) 25 MG tablet     cloNIDine (CATAPRES-TTS1) 0.1 MG/24HR WK patch     enoxaparin ANTICOAGULANT (LOVENOX) 40 MG/0.4ML syringe     folic acid (FOLVITE) 1 MG tablet     gabapentin (NEURONTIN) 100 MG capsule     HydrOXYzine Pamoate (VISTARIL PO)     levothyroxine (SYNTHROID/LEVOTHROID) 88 MCG tablet     losartan (COZAAR) 100 MG tablet     methocarbamol (ROBAXIN) 500 MG tablet     mirtazapine (REMERON) 7.5 MG tablet     ondansetron (ZOFRAN ODT) 8 MG ODT tab     oxyCODONE (ROXICODONE) 5 MG tablet     polyethylene glycol (MIRALAX) 17 GM/Dose powder     rizatriptan (MAXALT-MLT) 10 MG ODT     senna-docusate (SENOKOT-S/PERICOLACE) 8.6-50 MG tablet     tiZANidine (ZANAFLEX) 2 MG tablet     valACYclovir (VALTREX) 1000 mg tablet     verapamil ER (VERELAN) 180 MG 24 hr capsule     Zoledronic Acid (RECLAST IV)     PMH:    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      HTN (hypertension)      Hypothyroidism      Malignant neoplasm of left lung, unspecified part of lung (H)      Meniere's disease      Migraines     ON NEURONTIN, sees  neurologist     Mild depression     sees psych     Osteopenia      Palpitations      PMB (postmenopausal bleeding) 01/01/2006    Had  D & C 2/06 showing inactive endometrium     Pulmonary nodules      PSH:    Past Surgical History:   Procedure Laterality Date     BIOPSY BREAST SEED LOCALIZATION  7/3/2012    Procedure: BIOPSY BREAST SEED LOCALIZATION;  RIGHT BREAST LUMPECTOMY WITH ULTRASOUND SEED LOCALIZATION;  Surgeon: Jaclyn Dalal MD;  Location:  [facility]     BREAST BIOPSY, RT/LT  11/29/00    stereotactic bx right breast--fibrocystic change     COLONOSCOPY N/A 1/30/2017    Procedure: COMBINED COLONOSCOPY, SINGLE OR MULTIPLE BIOPSY/POLYPECTOMY BY BIOPSY;  Surgeon: Muriel Wolff MD;  Location:  GI     DAVINCI LOBECTOMY LUNG Left 3/24/2023    Procedure: LOBECTOMY, LUNG, ROBOT-ASSISTED- wedge resection, COMPLETION LOWER LOBECTOMY, MEDIASTINAL LYMPH NODE DISSECTION;  Surgeon: Tran Hays MD;  Location: [facility] OR     DILATION AND CURETTAGE  2/27/06    inactive endometrium on path; done for PMB     ENT SURGERY      ear tubes, rhinoplasty     LAPAROSCOPY      Age 27 for pelvic pain     ORTHOPEDIC SURGERY      yaya MORALES HX:    Family History   Problem Relation Age of Onset     Hyperlipidemia Father      Hypertension Father      Heart Surgery Father      Anesthesia Reaction Father         delirium after CABG     Bone Cancer Paternal Grandmother         jaw bone cancer     No Known Problems Paternal Grandfather         Lung cancer     Breast Cancer Other         aunt     Ovarian Cancer Other      Uterine Cancer Other      Venous thrombosis No family hx of      SOCIAL HX:    Social History     Socioeconomic History     Marital status:      Spouse name: Virgilio     Number of children: 0     Years of education: Not on file     Highest education level: Not on file   Occupational History     Occupation:      Employer: [employer]   Tobacco Use     Smoking status: Former     Types: Cigarettes      Quit date:      Years since quittin.2     Smokeless tobacco: Never     Tobacco comments:     Smoked socially    Substance and Sexual Activity     Alcohol use: Yes     Comment: occasional     Drug use: No     Sexual activity: Never     Partners: Male     Birth control/protection: Post-menopausal   Other Topics Concern     Parent/sibling w/ CABG, MI or angioplasty before 65F 55M? Not Asked   Social History Narrative     -  is 10years younger.    Prior smoker.    Lives in [facility].    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    No children.     is chastity.     Social Determinants of Health     Financial Resource Strain: Not on file   Food Insecurity: Not on file   Transportation Needs: Not on file   Physical Activity: Not on file   Stress: Not on file   Social Connections: Not on file   Intimate Partner Violence: Not on file   Housing Stability: Not on file     PE:    Vitals: /89   Pulse 84   Temp 98.1  F (36.7  C) (Oral)   Resp 16   SpO2 99%   BMI= There is no height or weight on file to calculate BMI.     Cor: NSR  Chest: Clear  Abd: Without HSM  Ext: Without edema

## 2023-03-31 NOTE — ED NOTES
I took signout on the patient from Dr. Samuels.  This is a 67-year-old female with dizziness, shortness of breath, bilateral lower extremity paresthesias and chest pain.  Patient recently underwent left lower lobe lobectomy for new diagnosis of adenocarcinoma.  D-dimer was elevated and CT PE study was ordered.  Patient was signed out to me pending results of CT PE study.  This shows no acute abnormalities there is a small pneumothorax subcu air which is likely due to recent chest tube after resection.  I discussed this with Thoracic Surgery who states this is normal postop changes.  I discussed all results with the patient.  At this time we do not know cause of patient's symptoms and I believe she would benefit from monitoring and further pain control.  We will admit for observation.     Isak Carroll,   03/31/23 0510

## 2023-03-31 NOTE — ASSESSMENT & PLAN NOTE
Prior to admission medication(s): clonidine TTS, [place]gro[place], losartan (Cozaar ), verapamil  Plan:  Pharmacy medication reconciliation pending - to continue medications as at home.

## 2023-04-02 ENCOUNTER — PATIENT OUTREACH (OUTPATIENT)
Dept: CARE COORDINATION | Facility: CLINIC | Age: 68
End: 2023-04-02
Payer: COMMERCIAL

## 2023-04-02 NOTE — PROGRESS NOTES
"Clinic Care Coordination Contact  [facility]: Post-Discharge Note  SITUATION                                                      Admission:    Admission Date: 03/30/23   Reason for Admission: shortness of breath, chest pain, possible hypotension and tachycardia.  Discharge:   Discharge Date: 03/31/23  Discharge Diagnosis: RADHA on CKD  Hypotension  Hypothyroidism  Anxiety  Adenocarcinoma of left lung    BACKGROUND                                                      Per hospital discharge summary and inpatient provider notes:  Patient is a 67-year-old woman with HTN, CKD, hypothyroidism, migraines with history of meningioma, Ménière's disease, MDD, Hx of DCIS, NSCLC s/p left upper lobe robotic wedge resection 3/24/2023.  He presented to the ED complaining of weakness, tiredness, and lightheadedness for the past 2 days.      ASSESSMENT           Discharge Assessment  How are you doing now that you are home?: \"Fair\"  How are your symptoms? (Red Flag symptoms escalate to triage hotline per guidelines): Improved  Do you feel your condition is stable enough to be safe at home until your provider visit?: Yes  Does the patient have their discharge instructions? : Yes  Does the patient have questions regarding their discharge instructions? : No  Were you started on any new medications or were there changes to any of your previous medications? : Yes  Does the patient have all of their medications?: Yes  Do you have questions regarding any of your medications? : No  Do you have all of your needed medical supplies or equipment (DME)?  (i.e. oxygen tank, CPAP, cane, etc.): Yes (incentive spirometer)  Discharge follow-up appointment scheduled within 14 calendar days? : No  Is patient agreeable to assistance with scheduling? : No ( will call tomorrow to schedule with PCP; oncology team will be outreaching to patient for appointment)         Post-op (Clinicians Only)  Did the patient have surgery or a procedure: " "No    Patient still having pain related to recent lobectomy. Hard to find a comfortable position to rest. Notes she has a \"low pain tolerance\". Feels anxious and overwhelmed when the pain is bad. Taking pain medications as prescribed. Checking blood pressure readings three times per day as instructed and is holding blood pressure medications, with the exception of clonidine patch, as instructed with readings less than 160/90. Blood pressure last evening 164/97 and she took blood pressure medications. Today 147/80's and she held medications as instructed. Still with poor appetite and encouraged to eat small, frequent meals.  to schedule appointments with PCP and nephrology. 24/7 [facility] nurse triage phone number provided to patient.         PLAN                                                      Outpatient Plan:  Follow up with Nephrology            Future Appointments   Date Time Provider Department Center   4/18/2023 10:15 AM Hany Templeton MD [facility]   4/21/2023  9:45 AM [id number]   4/21/2023 12:45 PM Sabrina Lopez APRN [facility]         For any urgent concerns, please contact our 24 hour nurse triage line: [phone] ([phone])         Valeria Melchor RN                "

## 2023-04-04 LAB — BACTERIA BLD CULT: NO GROWTH

## 2023-04-05 LAB — BACTERIA BLD CULT: NO GROWTH

## 2023-04-07 ENCOUNTER — TELEPHONE (OUTPATIENT)
Dept: SURGERY | Facility: CLINIC | Age: 68
End: 2023-04-07
Payer: COMMERCIAL

## 2023-04-07 ENCOUNTER — NURSE TRIAGE (OUTPATIENT)
Dept: ONCOLOGY | Facility: CLINIC | Age: 68
End: 2023-04-07
Payer: COMMERCIAL

## 2023-04-07 ENCOUNTER — PATIENT OUTREACH (OUTPATIENT)
Dept: CARE COORDINATION | Facility: CLINIC | Age: 68
End: 2023-04-07
Payer: COMMERCIAL

## 2023-04-07 DIAGNOSIS — R91.1 LUNG NODULE: ICD-10-CM

## 2023-04-07 RX ORDER — GABAPENTIN 100 MG/1
100 CAPSULE ORAL
Qty: 14 CAPSULE | Refills: 0 | Status: SHIPPED | OUTPATIENT
Start: 2023-04-07 | End: 2023-04-21

## 2023-04-07 NOTE — PROGRESS NOTES
Clinic Care Coordination Contact  [facility]/Voicemail       Clinical Data: Care Coordinator Outreach - pt seen by PCP following surgery. TULIO WU received a request to assist pt in moving up surgical f/u appointment due to ongoing severe pain.     Outreach attempted x 1.  Left message on patient's voicemail with call back information and requested return call. TULIO WU also provided the number given in discharge paperwork for surgery team ([phone] )    Plan: Care Coordinator will try to reach patient again in 1-2 business days.    MARY JO Fajardo   Social Work Care Coordinator  [phone]      Addenda:   TULIO WU received a VM back from pt sharing she had not yet reached out to the surgery team. Pt noted she's taking the prescribed amount of pain medication but still very limited in what she can do daily due to pain. Pt reports difficulty speaking and being understood.     TULIO WU reached out and spoke with triage at the surgery center. They kindly agreed to call pt and complete a pain assessment.     TULIO WU outreached to pt and updated her. Pt will await call from surgery team.     No further outreaches planned.     MARY JO Fajardo   Social Work Care Coordinator  [phone]

## 2023-04-07 NOTE — TELEPHONE ENCOUNTER
"Call from TULIO Loaiza at clinic, who states pt was in to see her primary care doctor yesterday and had reached out to SW RE: pain and questioning if pt should be seen for surgical follow up sooner. TULIO asking how to go about this. Writer informed RN can call pt to do a pain screening by phone and review symptoms with NP/RNCC to see how to help with pain/if pt should be seen sooner. TULIO is going to reach out to pt now to update, writer will plan to call around 2:30pm.     S: 67 yr old female 14 days post lobectomy and medicastinal lymph node dissection having continuing pain in back that wraps around chest to front.     B: Dr. Hays performed above procedures on 3/24/23. Pt has follow up with Sabrina Lopez on 4/21/23.    A: 1434 call to pt to follow up on pain post surgery  Pt reports pain in her back that wraps around to front. Pt reports pain is \"managable for a while, but then worsens and worsens\". She states there is no comfortable position to sit or lay in. She is able to get up, shower and do things around the house until she runs out of energy, then is down for the rest of the day.   Appetite continues to be poor, trying to drinking fluids but not overdue it. Pt has been taking Tylenol 1g on average q6-8 hours, using Oxycodone 5mg on average 3x/day, methocarbamol 500mg using on average 2x/d morning and night. She said she was taking gabapentin 100mg at HS but has completed this prescription. She also has been using heating pad which is helpful. Pt is using pillows to sleep more upright, as she cannot lay on either side.   Pt reports she has been getting winded with breathing, very soft spoken on phone call, said she has been having chest pressure, but feels this is more anxiety related. She is going to see her primary again on Monday and nephrology on Tuesday. She continues to monitor blood pressures at home and denies any concerns with them.     Pt saw PCP prescribed lorazepam (pt thinks 0.5mg tabs, said the " bottle was in her bathroom and didn't have energy to go check during call) and refilled Oxycodone for pt, which has been helpful for weeping and anxiety. Pt states she has read and researched the final report, some was pretty scary for her, said she cannot get into oncology until May 1. Pt voiced understanding that her body needs to heal before any treatments could be started, but having a lot of anxiety with this.     R: Writer participated in active listening to pt as she talked about anxiety and feelings. Writer reminded pt importance of listening to her body and resting as needed between activities. Writer reviewed orders for methocarbamol of 1 tab q6h PRN, recommended pt try taking another tablet in the afternoon, as she reported taking just AM/HS. Pt said she will try that now and plans to turn the heating pad on and rest. Writer encouraged pt to continue taking Tylenol and Oxycodone as needed for pain as well. Informed will send message to Dr. Hays's nurse, Iris, and NP,  Sabrina, to further review for recommendations and if appt on 4/21 should be moved up and someone would call pt back. Pt voiced understanding and was very appreciative of call.     Encounter routed to JACKIE Simmons, and Sabrina Lopez.

## 2023-04-07 NOTE — TELEPHONE ENCOUNTER
Spoke with Sonny regarding triage phone call. Discussed with Sabrina HEWITT and pt will plan to plan to restart gabapentin for neuropathic pain. Pt aware it will take 2 weeks to reach theraputic level. Also reviewed taking methocarbamol and tylenol as directed (no ibuprofen due to kidneys). Pt using heat packs. We reviewed that it can be typical to have pain like this after the surgery that she had.     Pt expressed anxiety regarding pathology report from surgery. Writer talked through feelings with pt and reviewed that per report, lymph nodes were negative and pt expressed that she was reassured by this news and she plans to follow up with oncology as scheduled.     Reviewed that she was prescribed ativan by PCP at her visit yesterday and that she can use this medication as directed to help with anxiety. Encouraged her to focus on rest, caring for herself, and pain control.     Sonny voiced understanding and stated that she was appreciative of phone call, will plan to call again on Monday for further needs. Also has another appointment with PCP on Monday.    Iris Musa, RN BSN  Thoracic Surgery RN Care Coordinator  [phone]

## 2023-04-18 ENCOUNTER — VIRTUAL VISIT (OUTPATIENT)
Dept: PULMONOLOGY | Facility: CLINIC | Age: 68
End: 2023-04-18
Attending: INTERNAL MEDICINE
Payer: COMMERCIAL

## 2023-04-18 DIAGNOSIS — R49.0 HOARSENESS: Primary | ICD-10-CM

## 2023-04-18 PROCEDURE — G0463 HOSPITAL OUTPT CLINIC VISIT: HCPCS | Mod: PN,TEL | Performed by: INTERNAL MEDICINE

## 2023-04-18 PROCEDURE — 99214 OFFICE O/P EST MOD 30 MIN: CPT | Mod: TEL | Performed by: INTERNAL MEDICINE

## 2023-04-18 NOTE — LETTER
4/18/2023       RE: Sonny Rush  [address]  [facility] [address]     Dear Colleague,    Thank you for referring your patient, Sonny Rush, to the [facility] at [facility]. Please see a copy of my visit note below.          [facility]  Interventional Pulmonary Clinic Virtual Visit Note    April 18, 2023    Chief complaint:  Sonny Rush is a 67 year old female seen for   Chief Complaint   Patient presents with    Oncology Telephone Visit Return     Pulmonary nodules, f/u       Reason for clinic visit / Chief complaint:   Pulmonary nodule     Assessment and Plan:  Indeterminate pulmonary nodule, left lower lobe, cavitary, irregular borders, spiculated, non calcified. She had a PET/CT scan in 2012 when she was diagnosed of breast cancer however I could not locate that image.  Chest x-ray from 2007 did not reveal any abnormality to my review.  I have personally reviewed her CT scan of the chest from September, October and December 2022 and Jan 2023 (CT/PET).  We had decided on CT guided biopsy however the lesion was somewhat smaller (solid part) therefore the biopsy was cancelled considering its potential risks and benefits in Oct 2022.  I have also personally reviewed her PFTs revealing hyperinflation and air trapping otherwise normal spirometric indicis and diffusing capacity.   We had a discussion today again how to approach or follow the cavitary lesion in the left lower lobe.  CT-guided biopsy in robotic bronchoscopic biopsies are possibilities but not 100% yield and potential complications such as pneumothorax, bleeding or seeding infection in the pleural space if CT-guided biopsy done. More definitive approach will be surgical resection.  Patient is in agreement to talk to a thoracic surgeon to hear about technical aspect of the procedure.      Subsequently she was seen by Dr. Hays and underwent  initially segmentectomy done completion lobectomy of the left lower lobe on March 24, 2023.  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  Patient is scheduled to be seen by Dr. Srikanth Alves from medical oncology for further care.    Hoarseness which developed after her surgery and not improving. Referred her to voice clinic/ENT for further evaluation.     History of breast cancer treated with lumpectomy and radiation treatment (right breast).        History of Present Illness:  This is a 67 years old woman with no known pulmonary problems no history of exposure except for radiation treatment for right breast in 2012 recently fell and ended up having CT scan of the chest and abdomen/pelvis and found to have cavitary lesion and referred to our clinic for further evaluation.  She has no respiratory symptoms.  Bronchitis in the past but no pneumonia that she could remember.  Breast cancer 2012, lumpectomy, XRT  Lung cancer diagnosed March 2023, s/p LLLectomy      Smoking: quit 30 years ago after smoking socially for a year  Bronchitis  Paternal grandfather lung cancer  Exposure: none               Allergies   Allergen Reactions    Codeine Sulfate      Cough syrup  Hyperactive, anxiety    Depakote      Pill form caused GI disturbance.     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     HTN (hypertension)     Hypothyroidism     Malignant neoplasm of left lung, unspecified part of lung (H)     Meniere's disease     Migraines     ON NEURONTIN, sees neurologist    Mild depression     sees psych    Osteopenia     Palpitations     PMB (postmenopausal bleeding) 01/01/2006    Had  D & C 2/06 showing inactive endometrium    Pulmonary nodules         Past Surgical History:   Procedure Laterality Date    BIOPSY BREAST SEED  LOCALIZATION  7/3/2012    Procedure: BIOPSY BREAST SEED LOCALIZATION;  RIGHT BREAST LUMPECTOMY WITH ULTRASOUND SEED LOCALIZATION;  Surgeon: Jaclyn Dalal MD;  Location:  [facility]    BREAST BIOPSY, RT/LT  00    stereotactic bx right breast--fibrocystic change    COLONOSCOPY N/A 2017    Procedure: COMBINED COLONOSCOPY, SINGLE OR MULTIPLE BIOPSY/POLYPECTOMY BY BIOPSY;  Surgeon: Muriel Wolff MD;  Location:  GI    DAVINCI LOBECTOMY LUNG Left 3/24/2023    Procedure: LOBECTOMY, LUNG, ROBOT-ASSISTED- wedge resection, COMPLETION LOWER LOBECTOMY, MEDIASTINAL LYMPH NODE DISSECTION;  Surgeon: Tran Hays MD;  Location: [facility] OR    DILATION AND CURETTAGE  06    inactive endometrium on path; done for PMB    ENT SURGERY      ear tubes, rhinoplasty    LAPAROSCOPY      Age 27 for pelvic pain    [facility]OPED[facility] SURGERY      [facility]        Social History     Socioeconomic History    Marital status:      Spouse name: Virgilio    Number of children: 0    Years of education: Not on file    Highest education level: Not on file   Occupational History    Occupation:      Employer: [employer]   Tobacco Use    Smoking status: Former     Types: Cigarettes     Quit date:      Years since quittin.3    Smokeless tobacco: Never    Tobacco comments:     Smoked socially    Vaping Use    Vaping status: Not on file   Substance and Sexual Activity    Alcohol use: Yes     Comment: occasional    Drug use: No    Sexual activity: Never     Partners: Male     Birth control/protection: Post-menopausal   Other Topics Concern    Parent/sibling w/ CABG, MI or angioplasty before 65F 55M? Not Asked   Social History Narrative     -  is 10years younger.    Prior smoker.    Lives in [place].    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    No children.     is chastity.     Social Determinants of Health     Financial Resource Strain: Not on file   Food Insecurity: Not on file   Transportation  Needs: Not on file   Physical Activity: Not on file   Stress: Not on file   Social Connections: Not on file   Intimate Partner Violence: Not on file   Housing Stability: Not on file        Family History   Problem Relation Age of Onset    Hyperlipidemia Father     Hypertension Father     Heart Surgery Father     Anesthesia Reaction Father         delirium after CABG    Bone Cancer Paternal Grandmother         jaw bone cancer    No Known Problems Paternal Grandfather         Lung cancer    Breast Cancer Other         aunt    Ovarian Cancer Other     Uterine Cancer Other     Venous thrombosis No family hx of         Immunization History   Administered Date(s) Administered    COVID-19 Vaccine 12+ (Pfizer 2022) 05/12/2022    COVID-19 Vaccine Bivalent Booster 12+ (Pfizer) 10/28/2022       Current Outpatient Medications   Medication Sig    acetaminophen (TYLENOL) 325 MG tablet Take 2 tablets (650 mg) by mouth every 4 hours as needed for other (For optimal non-opioid multimodal pain management to improve pain control.)    buPROPion (WELLBUTRIN XL) 150 MG 24 hr tablet Take 150 mg by mouth every morning    busPIRone (BUSPAR) 15 MG tablet Take 15 mg by mouth 2 times daily    chlorthalidone (HYGROTON) 25 MG tablet Take 25 mg by mouth daily    cloNIDine (CATAPRES-TTS1) 0.1 MG/24HR WK patch Place 1 patch onto the skin once a week Changes on Sat morning    gabapentin (NEURONTIN) 100 MG capsule Take 1 capsule (100 mg) by mouth nightly as needed for neuropathic pain    HydrOXYzine Pamoate (VISTARIL PO) Take 25 mg by mouth as needed (Patient not taking: Reported on 2/9/2023)    levothyroxine (SYNTHROID/LEVOTHROID) 88 MCG tablet Take 88 mcg by mouth daily    losartan (COZAAR) 100 MG tablet Take 100 mg by mouth daily    mirtazapine (REMERON) 7.5 MG tablet Take 7.5 mg by mouth nightly as needed (insomnia)    ondansetron (ZOFRAN ODT) 8 MG ODT tab Take 8 mg by mouth every 8 hours as needed for nausea    oxyCODONE (ROXICODONE) 5 MG tablet  Take 1 tablet (5 mg) by mouth every 6 hours as needed for severe pain (7-10)    polyethylene glycol (MIRALAX) 17 GM/Dose powder Take 17 g by mouth daily    rizatriptan (MAXALT-MLT) 10 MG ODT Take 1 tablet by mouth as needed    senna-docusate (SENOKOT-S/PERICOLACE) 8.6-50 MG tablet Take 1 tablet by mouth 2 times daily    tiZANidine (ZANAFLEX) 2 MG tablet Take 1-3 tablets by mouth nightly as needed    valACYclovir (VALTREX) 1000 mg tablet Take 1,000 mg by mouth 2 times daily as needed (cold sores)    verapamil ER (VERELAN) 180 MG 24 hr capsule Take 2 capsules (360 mg) by mouth At Bedtime    Zoledronic Acid (RECLAST IV) Inject 5 mg into the vein once yearly     No current facility-administered medications for this visit.        Review of Systems:  I have done 10 points of review systems and all negative except for those mentioned in HPI    Physical examination  Constitutional: Oriented, not in distress  Head and neck: normal posture and movements  Respiratory: Normal tidal breathing, no shortness of breath, no audible wheezing or stridor but hoarseness   Musculoskeletal: Normal muscle mass, no deformity on hands/fingers  Psychiatric:  Mood and affect are appropriate with insight into his/her medical condition    Data:  Lab Results   Component Value Date    WBC 5.6 03/31/2023    WBC 5.8 02/07/2007     Lab Results   Component Value Date    RBC 3.24 03/31/2023    RBC 4.38 02/07/2007     Lab Results   Component Value Date    HGB 10.4 03/31/2023    HGB 13.8 02/07/2007     Lab Results   Component Value Date    HCT 35.8 03/31/2023    HCT 39.6 02/07/2007     Lab Results   Component Value Date     03/31/2023    MCV 91 02/07/2007     Lab Results   Component Value Date    MCH 32.1 03/31/2023    MCH 31.5 02/07/2007     Lab Results   Component Value Date    MCHC 29.1 03/31/2023    MCHC 34.7 02/07/2007     Lab Results   Component Value Date    RDW 12.5 03/31/2023    RDW 12.8 02/07/2007     Lab Results   Component Value Date      03/31/2023     02/07/2007       Lab Results   Component Value Date     03/31/2023     02/07/2007      Lab Results   Component Value Date    POTASSIUM 4.2 03/31/2023    POTASSIUM 3.5 03/24/2023    POTASSIUM 3.7 12/13/2022    POTASSIUM 3.2 02/07/2007     Lab Results   Component Value Date    CHLORIDE 104 03/31/2023    CHLORIDE 94 12/13/2022    CHLORIDE 96 10/28/2022    CHLORIDE 106 02/07/2007     Lab Results   Component Value Date    DARRICK 8.1 03/31/2023    DARRICK 10.2 02/07/2007     Lab Results   Component Value Date    CO2 18 03/31/2023    CO2 28 12/13/2022    CO2 28 02/07/2007     Lab Results   Component Value Date    BUN 9.5 03/31/2023    BUN 22 12/13/2022    BUN 15 02/07/2007     Lab Results   Component Value Date    CR 0.90 03/31/2023    CR 1.13 02/07/2007     Lab Results   Component Value Date     03/31/2023    GLC 99 03/30/2023     12/13/2022    GLC 90 02/07/2007         NABIL Templeton MD

## 2023-04-18 NOTE — TELEPHONE ENCOUNTER
FUTURE VISIT INFORMATION      FUTURE VISIT INFORMATION:    Date: 4/19/2023    Time: 2 PM    Location: [facility]  REFERRAL INFORMATION:    Referring provider: Dr. Hany Templeton    Referring providers clinic: [facility] - Pulmonology    Reason for visit/diagnosis: Voice hasn't returned since surgery    RECORDS REQUESTED FROM:       Clinic name Comments Records Status Imaging Status   [facility] 4/18/23 - PULM OV with Dr. Templeton  2/9/23 - ONC OV with Dr. Saúl Helms    [facility] 3/30/23 - ED OV with Dr. Samuels  3/24/23 - Admission with Dr. Jass Helms    [facility] - Surgery 3/24/23 - OP Note for LUNG LOBECTOMY, WEDGE RESECTION with Dr. Saúl Helms    [facility] - Procedure 9/22/22 - PFT [facility] - Imaging 3/30/22 - CT Chest [facility] PACs

## 2023-04-18 NOTE — NURSING NOTE
Is the patient currently in the state of [place]? YES    Visit mode:TELEPHONE    If the visit is dropped, the patient can be reconnected by: TELEPHONE VISIT: Phone number: [phone]    Will anyone else be joining the visit? NO      How would you like to obtain your AVS? MyChart    Are changes needed to the allergy or medication list? NO  Patient verified medications and allergies are correct via eCheck-in. Patient confirms no changes at this time and/or since last reviewed by clinic staff.    Reason for visit: Oncology Telephone Visit Return (Pulmonary nodules, f/u)  Sonny Ledesma Victor Manuel 67 year old female presents today for f/u on lung nodules and review CT results.  Maricarmen Brooke, Virtual Facilitator

## 2023-04-18 NOTE — PROGRESS NOTES
THORACIC SURGERY FOLLOW UP VISIT    I saw Mrs. Rush in follow-up today. The clinical summary follows:     PREOP DIAGNOSIS   Cavitary nodule of the left lower lobe of lung  PROCEDURE   Robot assisted thoracoscopic left lower lobe wedge resection, completion left lower lobectomy, mediastinal lymph node dissection    DATE OF PROCEDURE  03/24/2023    HISTOPATHOLOGY   Invasive mucinous adenocarcinoma, pT2    COMPLICATIONS  None    INTERVAL STUDIES  CXR: Continued left pleural effusion. Resolution of previous right lung base effusion/thickening. Continued left hemithorax volume loss.    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      HTN (hypertension)      Hypothyroidism      Malignant neoplasm of left lung, unspecified part of lung (H)      Meniere's disease      Migraines     ON NEURONTIN, sees neurologist     Mild depression     sees psych     Osteopenia      Palpitations      PMB (postmenopausal bleeding) 01/01/2006    Had  D & C 2/06 showing inactive endometrium     Pulmonary nodules       Past Surgical History:   Procedure Laterality Date     BIOPSY BREAST SEED LOCALIZATION  7/3/2012    Procedure: BIOPSY BREAST SEED LOCALIZATION;  RIGHT BREAST LUMPECTOMY WITH ULTRASOUND SEED LOCALIZATION;  Surgeon: Jaclyn Dalal MD;  Location:  [facility]     BREAST BIOPSY, RT/LT  11/29/00    stereotactic bx right breast--fibrocystic change     COLONOSCOPY N/A 1/30/2017    Procedure: COMBINED COLONOSCOPY, SINGLE OR MULTIPLE BIOPSY/POLYPECTOMY BY BIOPSY;  Surgeon: Muriel Wolff MD;  Location:  GI     DAVINCI LOBECTOMY LUNG Left 3/24/2023    Procedure: LOBECTOMY, LUNG, ROBOT-ASSISTED- wedge resection, COMPLETION LOWER LOBECTOMY, MEDIASTINAL LYMPH NODE DISSECTION;  Surgeon: Tran Hays MD;  Location: [facility] OR     DILATION AND CURETTAGE  2/27/06    inactive endometrium on path; done for PMB     ENT  SURGERY      ear tubes, rhinoplasty     LAPAROSCOPY      Age 27 for pelvic pain     ORTHOPEDIC SURGERY      yaya Dennis is still struggling with pain around her chest-approximately at the bra line. She does get some relief with the use of the muscle relaxer and a heating pad. She is unable to lie flat yet so her sleep is also not optimal thus she is still very fatiqued. She is trying to be active around the house but gets tired quickly. Her voice is very hoarse and slight. She is seeing Dr. Cuevas with ENT on June 1 for a vocal cord injection.     She has all over body aches and pain that she has had prior to surgery. It seems that this has been exacerbated by the recent surgery. She is also reporting migraines nearly every day. Prior to surgery, she would get symptoms that resembled the flu-body aches, joint pain, etc. She says she was told she has fibromyalgia. She has never seen a Pain Specialist nor does she recall every being evaluated for a possible auto-immune disorder.    OBJECTIVE  BP (!) 145/88   Pulse 69   Temp 97.9  F (36.6  C) (Oral)   Resp 16   Wt 70.2 kg (154 lb 11.2 oz)   SpO2 100%   BMI 24.60 kg/m      Her incisions are healing nicely and without erythema.    IMPRESSION   67 year-old female status post left lower lobe wedge resection, completion left lower lobectomy, and mediastinal lymph node dissection for a pT2N0 (stage IB) non small cell lung cancer. She is here today for post operative follow up.    Given her immune symptoms when she has a pain flare up, I am curious if she perhaps some underlying autoimmune disorder. I will check CRP, ESR, DREAD, Rheumatoid factor and LDH today. If these are abnormal, I will refer her to Rheumatology for further evaluation. If they are normal or not suggestive of an autoimmune process, I will refer her to a chronic Pain Specialist.    I will have her go up to 3 times a day on her gabapentin (currently taking it once a day). This may help  with her neuropathic pain and may also help relieve her migraines. I explained the need to take this as scheduled and not randomly because it takes a couple of weeks for the gabapentin to reach a therapeutic level in the bloodstream.    PLAN  I spent 40 min on the date of the encounter in chart review, patient visit, review of tests, documentation and/or discussion with other providers about the issues documented above. I reviewed the plan as follows:  Further oncology follow up per Dr. Alves  Labs today  Refill gabapentin with new dosing instructions  Follow up with Thoracic Surgery as needed    All questions were answered and the patient and present family were in agreement with the plan.  I appreciate the opportunity to participate in the care of your patient and will keep you updated.  Sincerely,

## 2023-04-18 NOTE — PROGRESS NOTES
Virtual Visit Details    Type of service:  Telephone Visit    Telephone Start Time: 10:15 am    Telephone End Time: 10:23 am    Originating Location (pt. Location): Home        Distant Location (provider location):  On-site    Mode of Communication:  Telemed Conference via Telephone        [facility]  Interventional Pulmonary Clinic Virtual Visit Note    April 18, 2023    Chief complaint:  Sonny Rush is a 67 year old female seen for   Chief Complaint   Patient presents with     Oncology Telephone Visit Return     Pulmonary nodules, f/u       Reason for clinic visit / Chief complaint:   Pulmonary nodule     Assessment and Plan:  Indeterminate pulmonary nodule, left lower lobe, cavitary, irregular borders, spiculated, non calcified. She had a PET/CT scan in 2012 when she was diagnosed of breast cancer however I could not locate that image.  Chest x-ray from 2007 did not reveal any abnormality to my review.  I have personally reviewed her CT scan of the chest from September, October and December 2022 and Jan 2023 (CT/PET).  We had decided on CT guided biopsy however the lesion was somewhat smaller (solid part) therefore the biopsy was cancelled considering its potential risks and benefits in Oct 2022.  I have also personally reviewed her PFTs revealing hyperinflation and air trapping otherwise normal spirometric indicis and diffusing capacity.   We had a discussion today again how to approach or follow the cavitary lesion in the left lower lobe.  CT-guided biopsy in robotic bronchoscopic biopsies are possibilities but not 100% yield and potential complications such as pneumothorax, bleeding or seeding infection in the pleural space if CT-guided biopsy done. More definitive approach will be surgical resection.  Patient is in agreement to talk to a thoracic surgeon to hear about technical aspect of the procedure.      Subsequently she was seen by Dr. Hays and underwent initially segmentectomy done completion  lobectomy of the left lower lobe on March 24, 2023.  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  Patient is scheduled to be seen by Dr. Srikanth Alves from medical oncology for further care.    Hoarseness which developed after her surgery and not improving. Referred her to voice clinic/ENT for further evaluation.     History of breast cancer treated with lumpectomy and radiation treatment (right breast).        History of Present Illness:  This is a 67 years old woman with no known pulmonary problems no history of exposure except for radiation treatment for right breast in 2012 recently fell and ended up having CT scan of the chest and abdomen/pelvis and found to have cavitary lesion and referred to our clinic for further evaluation.  She has no respiratory symptoms.  Bronchitis in the past but no pneumonia that she could remember.  Breast cancer 2012, lumpectomy, XRT  Lung cancer diagnosed March 2023, s/p LLLectomy      Smoking: quit 30 years ago after smoking socially for a year  Bronchitis  Paternal grandfather lung cancer  Exposure: none               Allergies   Allergen Reactions     Codeine Sulfate      Cough syrup  Hyperactive, anxiety     Depakote      Pill form caused GI disturbance.      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      HTN (hypertension)      Hypothyroidism      Malignant neoplasm of left lung, unspecified part of lung (H)      Meniere's disease      Migraines     ON NEURONTIN, sees neurologist     Mild depression     sees psych     Osteopenia      Palpitations      PMB (postmenopausal bleeding) 01/01/2006    Had  D & C 2/06 showing inactive endometrium     Pulmonary nodules         Past Surgical History:   Procedure Laterality Date     BIOPSY BREAST SEED LOCALIZATION   7/3/2012    Procedure: BIOPSY BREAST SEED LOCALIZATION;  RIGHT BREAST LUMPECTOMY WITH ULTRASOUND SEED LOCALIZATION;  Surgeon: Jaclyn Dalal MD;  Location:  [facility]     BREAST BIOPSY, RT/LT  00    stereotactic bx right breast--fibrocystic change     COLONOSCOPY N/A 2017    Procedure: COMBINED COLONOSCOPY, SINGLE OR MULTIPLE BIOPSY/POLYPECTOMY BY BIOPSY;  Surgeon: Muriel Wolff MD;  Location:  GI     DAVINCI LOBECTOMY LUNG Left 3/24/2023    Procedure: LOBECTOMY, LUNG, ROBOT-ASSISTED- wedge resection, COMPLETION LOWER LOBECTOMY, MEDIASTINAL LYMPH NODE DISSECTION;  Surgeon: Tran Hays MD;  Location: [facility] OR     DILATION AND CURETTAGE  06    inactive endometrium on path; done for PMB     ENT SURGERY      ear tubes, rhinoplasty     LAPAROSCOPY      Age 27 for pelvic pain     [facility] SURGERY      [facility]        Social History     Socioeconomic History     Marital status:      Spouse name: Virgilio     Number of children: 0     Years of education: Not on file     Highest education level: Not on file   Occupational History     Occupation:      Employer: [employer]   Tobacco Use     Smoking status: Former     Types: Cigarettes     Quit date:      Years since quittin.3     Smokeless tobacco: Never     Tobacco comments:     Smoked socially    Vaping Use     Vaping status: Not on file   Substance and Sexual Activity     Alcohol use: Yes     Comment: occasional     Drug use: No     Sexual activity: Never     Partners: Male     Birth control/protection: Post-menopausal   Other Topics Concern     Parent/sibling w/ CABG, MI or angioplasty before 65F 55M? Not Asked   Social History Narrative     -  is 10years younger.    Prior smoker.    Lives in [facility].    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    No children.     is chastity.     Social Determinants of Health     Financial Resource Strain: Not on file   Food Insecurity: Not on file    Transportation Needs: Not on file   Physical Activity: Not on file   Stress: Not on file   Social Connections: Not on file   Intimate Partner Violence: Not on file   Housing Stability: Not on file        Family History   Problem Relation Age of Onset     Hyperlipidemia Father      Hypertension Father      Heart Surgery Father      Anesthesia Reaction Father         delirium after CABG     Bone Cancer Paternal Grandmother         jaw bone cancer     No Known Problems Paternal Grandfather         Lung cancer     Breast Cancer Other         aunt     Ovarian Cancer Other      Uterine Cancer Other      Venous thrombosis No family hx of         Immunization History   Administered Date(s) Administered     COVID-19 Vaccine 12+ (Pfizer 2022) 05/12/2022     COVID-19 Vaccine Bivalent Booster 12+ (Pfizer) 10/28/2022       Current Outpatient Medications   Medication Sig     acetaminophen (TYLENOL) 325 MG tablet Take 2 tablets (650 mg) by mouth every 4 hours as needed for other (For optimal non-opioid multimodal pain management to improve pain control.)     buPROPion (WELLBUTRIN XL) 150 MG 24 hr tablet Take 150 mg by mouth every morning     busPIRone (BUSPAR) 15 MG tablet Take 15 mg by mouth 2 times daily     chlorthalidone (HYGROTON) 25 MG tablet Take 25 mg by mouth daily     cloNIDine (CATAPRES-TTS1) 0.1 MG/24HR WK patch Place 1 patch onto the skin once a week Changes on Sat morning     gabapentin (NEURONTIN) 100 MG capsule Take 1 capsule (100 mg) by mouth nightly as needed for neuropathic pain     HydrOXYzine Pamoate (VISTARIL PO) Take 25 mg by mouth as needed (Patient not taking: Reported on 2/9/2023)     levothyroxine (SYNTHROID/LEVOTHROID) 88 MCG tablet Take 88 mcg by mouth daily     losartan (COZAAR) 100 MG tablet Take 100 mg by mouth daily     mirtazapine (REMERON) 7.5 MG tablet Take 7.5 mg by mouth nightly as needed (insomnia)     ondansetron (ZOFRAN ODT) 8 MG ODT tab Take 8 mg by mouth every 8 hours as needed for  nausea     oxyCODONE (ROXICODONE) 5 MG tablet Take 1 tablet (5 mg) by mouth every 6 hours as needed for severe pain (7-10)     polyethylene glycol (MIRALAX) 17 GM/Dose powder Take 17 g by mouth daily     rizatriptan (MAXALT-MLT) 10 MG ODT Take 1 tablet by mouth as needed     senna-docusate (SENOKOT-S/PERICOLACE) 8.6-50 MG tablet Take 1 tablet by mouth 2 times daily     tiZANidine (ZANAFLEX) 2 MG tablet Take 1-3 tablets by mouth nightly as needed     valACYclovir (VALTREX) 1000 mg tablet Take 1,000 mg by mouth 2 times daily as needed (cold sores)     verapamil ER (VERELAN) 180 MG 24 hr capsule Take 2 capsules (360 mg) by mouth At Bedtime     Zoledronic Acid (RECLAST IV) Inject 5 mg into the vein once yearly     No current facility-administered medications for this visit.        Review of Systems:  I have done 10 points of review systems and all negative except for those mentioned in HPI    Physical examination  Constitutional: Oriented, not in distress  Head and neck: normal posture and movements  Respiratory: Normal tidal breathing, no shortness of breath, no audible wheezing or stridor but hoarseness   Musculoskeletal: Normal muscle mass, no deformity on hands/fingers  Psychiatric:  Mood and affect are appropriate with insight into his/her medical condition    Data:  Lab Results   Component Value Date    WBC 5.6 03/31/2023    WBC 5.8 02/07/2007     Lab Results   Component Value Date    RBC 3.24 03/31/2023    RBC 4.38 02/07/2007     Lab Results   Component Value Date    HGB 10.4 03/31/2023    HGB 13.8 02/07/2007     Lab Results   Component Value Date    HCT 35.8 03/31/2023    HCT 39.6 02/07/2007     Lab Results   Component Value Date     03/31/2023    MCV 91 02/07/2007     Lab Results   Component Value Date    MCH 32.1 03/31/2023    MCH 31.5 02/07/2007     Lab Results   Component Value Date    MCHC 29.1 03/31/2023    MCHC 34.7 02/07/2007     Lab Results   Component Value Date    RDW 12.5 03/31/2023    RDW  12.8 02/07/2007     Lab Results   Component Value Date     03/31/2023     02/07/2007       Lab Results   Component Value Date     03/31/2023     02/07/2007      Lab Results   Component Value Date    POTASSIUM 4.2 03/31/2023    POTASSIUM 3.5 03/24/2023    POTASSIUM 3.7 12/13/2022    POTASSIUM 3.2 02/07/2007     Lab Results   Component Value Date    CHLORIDE 104 03/31/2023    CHLORIDE 94 12/13/2022    CHLORIDE 96 10/28/2022    CHLORIDE 106 02/07/2007     Lab Results   Component Value Date    DARRICK 8.1 03/31/2023    DARRICK 10.2 02/07/2007     Lab Results   Component Value Date    CO2 18 03/31/2023    CO2 28 12/13/2022    CO2 28 02/07/2007     Lab Results   Component Value Date    BUN 9.5 03/31/2023    BUN 22 12/13/2022    BUN 15 02/07/2007     Lab Results   Component Value Date    CR 0.90 03/31/2023    CR 1.13 02/07/2007     Lab Results   Component Value Date     03/31/2023    GLC 99 03/30/2023     12/13/2022    GLC 90 02/07/2007         NABIL Templeton MD

## 2023-04-19 ENCOUNTER — OFFICE VISIT (OUTPATIENT)
Dept: OTOLARYNGOLOGY | Facility: CLINIC | Age: 68
End: 2023-04-19
Payer: COMMERCIAL

## 2023-04-19 ENCOUNTER — PRE VISIT (OUTPATIENT)
Dept: OTOLARYNGOLOGY | Facility: CLINIC | Age: 68
End: 2023-04-19

## 2023-04-19 DIAGNOSIS — J38.01 VOCAL FOLD PARALYSIS, LEFT: ICD-10-CM

## 2023-04-19 DIAGNOSIS — J38.7 LARYNGEAL HYPERFUNCTION: ICD-10-CM

## 2023-04-19 DIAGNOSIS — R49.0 DYSPHONIA: Primary | ICD-10-CM

## 2023-04-19 DIAGNOSIS — R49.0 HOARSENESS: ICD-10-CM

## 2023-04-19 DIAGNOSIS — R06.02 SHORTNESS OF BREATH: ICD-10-CM

## 2023-04-19 PROCEDURE — 92507 TX SP LANG VOICE COMM INDIV: CPT | Mod: GN | Performed by: SPEECH-LANGUAGE PATHOLOGIST

## 2023-04-19 PROCEDURE — 92524 BEHAVRAL QUALIT ANALYS VOICE: CPT | Mod: GN | Performed by: SPEECH-LANGUAGE PATHOLOGIST

## 2023-04-19 PROCEDURE — 31579 LARYNGOSCOPY TELESCOPIC: CPT | Mod: 52 | Performed by: SPEECH-LANGUAGE PATHOLOGIST

## 2023-04-19 NOTE — PROGRESS NOTES
"[facility] VOICE CLINIC  Hi Jauregui Jr., M.D., F.A.C.S.  Darline Cruz M.D., M.P.H.  Niru Cuevas M.D.  Mecca Espinal, Ph.D., CCC-SLP  Aron Caldwell, Ph.D., [facility]-SLP  Ban Gonsales M.M. (voice), M.A., CCC-SLP  Jessica Pandey M.A., CCC-SLP  Suellen Rasmussen M.S., CCC-SLP  JENNIFER Cates (voice), M.S., CCC-SLP    [facility]  VOICE/SPEECH/BREATHING EVALUATION AND LARYNGEAL EXAMINATION REPORT    Patient: Sonny Rush  Date of Visit: 4/19/2023    Clinician: Jessica Pandey M.A., CCC-SLP  Referring Physician: Hany Templeton MD    CHIEF COMPLAINT:  Voice and swallowing    HISTORY  Sonny Rush is a 67 year old female presenting today for evaluation of dysphonia. She had a lobectomy March 24, 2023 and she had two tubes down her throat during the procedure. She is status post left lower lobe wedge resection with completion of left lower lobectomy and mediastinal lymph node disection for pT2N0 (stage 1B) non small cell lung cancer.     She now experiences voice and swallowing issues. She gets fatigued even when she is not talking a lot. Her voice can be worse than her current voice today, and it is often a whisper.    Regarding swallowing, she drinks through a straw because if she takes too big of a sip then she will choke.    VOICE    Onset/inciting factors: March 24, 2023    Progression: worsened    Current Symptoms:    Right now it is close to a whisper    Gets worse through the day even without talking a lot     Her voice has not been normal since surgery    From the time she got home, it has progressively gotten worse.     She feels that she is straining to speak    It is difficult for people to understand her    Hard to laugh and sneeze is a \"snicker\"    Improves with: nothing    Worsens with: extended talking    Vocal demand:    She makes phone calls and puts it on speaker (she asks them to bear with her on the phone) but she usually avoids phone calls because it is too " "challenging    COUGH/THROAT CLEARING    Onset/ inciting factors: this may have started before her surgery    Progression: worsened since surgery    History of intervention:    Current Symptoms:    Hard for her to cough, but she needs to sometimes    She has a weak cough    She throat clears maybe 20 times a day    She has a \"lump, mucus, or drainage\" feeling    her cough/ throat clearing triggers include:  o Chemical smells like bathroom     SWALLOWING    Current Symptoms:    She takes small sips through a straw    Thin liquids go down the wrong pipe (once a day)    She has to pay attention when swallowing    With solids like toast or fruit, she takes small bites     She hasn't tried meats    She struggled with meats before surgery.     She is eating soup. She is drinking Ensure    She has lost 13 pounds since her surgery    She doesn't have much of an appetite currently    No pneumonias.     BREATHING    Current Symptoms:     She is still sleeping sitting up    Breathing is different since surgery    Some days she gets winded going up the stairs and some days she doesn't      She can have breathing problems with anxiety/panic attacks    This happened twice since she has been home     This also occurred in the hospital    This happened before surgery and it affected her breathing; \"it felt like I was having a heart attack\"    No noisy breathing    Before her surgery, she would practice breathing andyoga deep breathing would make her cough    She pointed to her upper chest     Dry and crunchy foods makes her cough      Throat     Current symptoms:    She has tightness, sore, or  lump in the throat feeling     She feels it right now    Occasionally she would feel the lump feeling with drainage before  Her surgery, but now it is worse    OTHER PERTINENT HISTORY    Status post left lower lobe wedge resection, completion left lower lobectomy, and mediastinal lymph node disection for pT2N0 (stage 1B) non small cell " "lung cancer    History of breast cancer    Her blood pressure plummeted after her surgery and went back to the hospital (not intubated then)    Reflux; managed with   o Occasionally but not recently    Sleeping difficulties  o Insomniac and trouble falling asleep and staying asleep- \"this has been forever\"  o Never had a sleep study.    Please refer to the chart for additional history    OBJECTIVE FINDINGS  VOICE/ SPEECH/ NON-COMMUNICATIVE LARYNGEAL BEHAVIORS EVALUATION  An evaluation of the voice and upper airway was accomplished today; salient features are as follows:    Cough/ Throat clear:    Frequent    Dry    Breathing pattern:    Shallow    Phonation is not coordinated with respiration    Talks to past end of breath stream    Tension is evident:    Face    Voice quality is characterized by    Marked-severe breathiness    Marked strain    Marked-severe asthenia    Intermittent clear voice with laughing    Her volume reduced as the evaluation progressed    A singing task shows voice quality is consistent between speech and singing    she rates her effort as 10 out of 10 (10 is maximum effort) for speech    GLOBAL ASSESSMENT OF DYSPHONIA: 95 /100      LARYNGEAL EXAMINATION    Endoscopic laryngeal examination was performed by:  Jessica Pandey M.A., CCC-SLP  I provided the protocol of instructions for the patient.  Verbal informed consent was obtained and witnessed prior to this procedure.   Type of exam:   Flexible endoscopy with chip-tip technology and stroboscopy; topical anesthesia with 3% Lidocaine with 0.25% phenylephrine was applied to both nares.    This exam shows:  Laryngeal and Vocal Fold Mucosa    Slight puffiness of the posterior commissure    Speckled diffuse red discoloration observed throughout the base of tongue    Round tissue observed at the right vallecular area    Presence of secretions is unremarkable.    Status of vocal fold mucosa:   o Mild bowing of the left vocal fold  o Within normal " "limits, with no lesions and straight vibratory margins    Narrow Band Imaging yielded the following:  no additional information    Neurological and Functional Integrity of the Larynx    Left vocal fold is immobile at the paramedian position    Right vocal fold comes to midline to provide incomplete glottic closure    Airway is adequate    Elongation of the vocal folds for pitch increase is WNL, but Ms. Rush demonstrated difficulty with descending pitch glide.    Supraglottic Function and Therapy Probes    Moderate four-way constriction of the supraglottic larynx during connected speech    Therapy probes show   o Slightly improved glottic closure with \"ee ee\" glottic coup    Dr. Cuevas and I reviewed this laryngeal exam with Ms. Rush today, and I provided pertinent explanations:    Endoscopic findings are consistent with audio-perceptual assessment and patient Hx/complaints.    her symptoms are accounted for by the immobile left vocal fold at the paramedian position with incomplete glottic closure and moderate four-way constriction of the supraglottic larynx during connected speech.    Because there appears to be a functional component to her symptoms, she would most likely benefit from functional speech therapy.    THERAPEUTIC ACTIVITIES  Today Ms. Rush participated in the following therapeutic activities:    Asked many questions about the nature of her symptoms, and I answered all of these thoroughly.    I provided instruction for the chin tuck to promote a safe swallow when drinking and eating.     I provided an after visit summary to help facilitate practice.    ASSESSMENT / PLAN  IMPRESSIONS:  This evaluation has resulted in the following diagnosis/diagnoses for Ms. 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      Laryngeal evaluation demonstrated left vocal fold is immobile at the paramedian position, right " "vocal fold comes to midline to provide incomplete glottic closure, mild bowing of the left vocal fold, moderate four-way constriction of the supraglottic larynx during connected speech, slightly improved glottic closure with \"ee ee\" glottic coup, slight puffiness of the posterior commissure, speckled diffuse red discoloration observed throughout the base of tongue, round tissue observed at the right vallecular area, and significant effort observed with elongation of the vocal folds for ascending and descending pitch glide.     Perceptual evaluation demonstrated marked-severe breathiness, marked strain, marked-severe asthenia, intermittent clear voice with laughing, and her volume reduced as the evaluation progressed    STIMULABILITY: results of therapy probes during perceptual and laryngeal evaluation demonstrate improvement with use of glottic coup to promote vocal fold closure  RECOMMENDATIONS:     A course of speech therapy is recommended to optimize vocal technique, improve voice quality and optimize surgical results.    She demonstrates a Good prognosis for improvement given adherence to therapeutic recommendations. Therapeutic     Positive indicators: commitment to process; diagnosis is known to respond to functional treatment;     Negative indicators: none    We briefly began therapy today, working on strategies to promote a safe swallow.    I recommended her to see Dr. Cuevas for swallow evaluation due to her frequently aspirating while eating and due to her significant weight loss.    TREATMENT PLAN  Speech therapy    DURATION/FREQUENCY OF TREATMENT  6 weekly or bi-weekly one-hour sessions, with 1 monthly one-hour follow-up sessions.    GOALS  Patient goal:    To improve and maintain a healthy voice quality  To understand the problem and fix it as much as possible  To have a normal and acceptable voice quality  To breathe normally and comfortably in all situations    Long-term goal(s): In 9 months, Ms. Rush" will:  1.  Report a week of typical activities, in which dysphonia, swallowing, and breathing does not exceed a level of 3 out of 10, 80% of the time  Report a speaking voice quality that is acceptable to her, 90%of the time  Report resolution of symptoms, and use of optimal voice quality and comfort to meet personal, social, and professional needs, 90% of the time during a typical week of vocal activities    This treatment plan was developed with the patient who agreed with the recommendations.    TOTAL SERVICE TIME: 60 minutes  EVALUATION OF VOICE AND RESONANCE (23787)  TREATMENT (04966)  ENDOSCOPIC LARYNGEAL EXAMINATION WITHOUT STROBOSCOPY (13741)  NO CHARGE FACILITY FEE (58638)      Jessica Pandey M.A., CCC-SLP  Speech-Language Pathologist  [facility]  [email or web address]  She/Her/Hers       Speech recognition software may have been used in this documentation; input is reviewed before signature to the best of my ability.

## 2023-04-19 NOTE — LETTER
4/19/2023       RE: Sonny Rush  [address]  [facility] [address]     Dear Colleague,    Thank you for referring your patient, Sonny Rush, to the [facility] VOICE CLINIC [place] at [facility]. Please see a copy of my visit note below.    [facility]  Hi Jauregui Jr., M.D., F.A.C.S.  Darline Cruz M.D., M.P.H.  Niru Cuevas M.D.  Mecca Espinal, Ph.D., CCC-SLP  Aron Caldwell, Ph.D., CCC-SLP  Ban Gonsales M.M. (voice), M.A., CCC-SLP  Jessica Pandey M.A., CCC-SLP  Suellen Rasmussen M.S., CCC-SLP  JENNIFER Cates (voice), M.S., CCC-SLP    [facility]  VOICE/SPEECH/BREATHING EVALUATION AND LARYNGEAL EXAMINATION REPORT    Patient: Sonny Rush  Date of Visit: 4/19/2023    Clinician: Jessica Pandey M.A., CCC-SLP  Referring Physician: Hany Templeton MD    CHIEF COMPLAINT:  Voice and swallowing    HISTORY  Sonny Rush is a 67 year old female presenting today for evaluation of dysphonia. She had a lobectomy March 24, 2023 and she had two tubes down her throat during the procedure. She is status post left lower lobe wedge resection with completion of left lower lobectomy and mediastinal lymph node disection for pT2N0 (stage 1B) non small cell lung cancer.     She now experiences voice and swallowing issues. She gets fatigued even when she is not talking a lot. Her voice can be worse than her current voice today, and it is often a whisper.    Regarding swallowing, she drinks through a straw because if she takes too big of a sip then she will choke.    VOICE  Onset/inciting factors: March 24, 2023  Progression: worsened  Current Symptoms:  Right now it is close to a whisper  Gets worse through the day even without talking a lot   Her voice has not been normal since surgery  From the time she got home, it has progressively gotten worse.   She feels that she is straining to speak  It is difficult  "for people to understand her  Hard to laugh and sneeze is a \"snicker\"  Improves with: nothing  Worsens with: extended talking  Vocal demand:  She makes phone calls and puts it on speaker (she asks them to bear with her on the phone) but she usually avoids phone calls because it is too challenging    COUGH/THROAT CLEARING  Onset/ inciting factors: this may have started before her surgery  Progression: worsened since surgery  History of intervention:  Current Symptoms:  Hard for her to cough, but she needs to sometimes  She has a weak cough  She throat clears maybe 20 times a day  She has a \"lump, mucus, or drainage\" feeling  her cough/ throat clearing triggers include:  Chemical smells like bathroom     SWALLOWING  Current Symptoms:  She takes small sips through a straw  Thin liquids go down the wrong pipe (once a day)  She has to pay attention when swallowing  With solids like toast or fruit, she takes small bites   She hasn't tried meats  She struggled with meats before surgery.   She is eating soup. She is drinking Ensure  She has lost 13 pounds since her surgery  She doesn't have much of an appetite currently  No pneumonias.     BREATHING  Current Symptoms:   She is still sleeping sitting up  Breathing is different since surgery  Some days she gets winded going up the stairs and some days she doesn't    She can have breathing problems with anxiety/panic attacks  This happened twice since she has been home   This also occurred in the hospital  This happened before surgery and it affected her breathing; \"it felt like I was having a heart attack\"  No noisy breathing  Before her surgery, she would practice breathing andyoga deep breathing would make her cough  She pointed to her upper chest   Dry and crunchy foods makes her cough    Throat   Current symptoms:  She has tightness, sore, or  lump in the throat feeling   She feels it right now  Occasionally she would feel the lump feeling with drainage before  Her " "surgery, but now it is worse    OTHER PERTINENT HISTORY  Status post left lower lobe wedge resection, completion left lower lobectomy, and mediastinal lymph node disection for pT2N0 (stage 1B) non small cell lung cancer  History of breast cancer  Her blood pressure plummeted after her surgery and went back to the hospital (not intubated then)  Reflux; managed with   Occasionally but not recently  Sleeping difficulties  Insomniac and trouble falling asleep and staying asleep- \"this has been forever\"  Never had a sleep study.  Please refer to the chart for additional history    OBJECTIVE FINDINGS  VOICE/ SPEECH/ NON-COMMUNICATIVE LARYNGEAL BEHAVIORS EVALUATION  An evaluation of the voice and upper airway was accomplished today; salient features are as follows:  Cough/ Throat clear:  Frequent  Dry  Breathing pattern:  Shallow  Phonation is not coordinated with respiration  Talks to past end of breath stream  Tension is evident:  Face  Voice quality is characterized by  Marked-severe breathiness  Marked strain  Marked-severe asthenia  Intermittent clear voice with laughing  Her volume reduced as the evaluation progressed  A singing task shows voice quality is consistent between speech and singing  she rates her effort as 10 out of 10 (10 is maximum effort) for speech  GLOBAL ASSESSMENT OF DYSPHONIA: 95 /100      LARYNGEAL EXAMINATION    Endoscopic laryngeal examination was performed by:  Jessica Pandey M.A., CCC-SLP  I provided the protocol of instructions for the patient.  Verbal informed consent was obtained and witnessed prior to this procedure.   Type of exam:   Flexible endoscopy with chip-tip technology and stroboscopy; topical anesthesia with 3% Lidocaine with 0.25% phenylephrine was applied to both nares.    This exam shows:  Laryngeal and Vocal Fold Mucosa  Slight puffiness of the posterior commissure  Speckled diffuse red discoloration observed throughout the base of tongue  Round tissue observed at the right " "vallecular area  Presence of secretions is unremarkable.  Status of vocal fold mucosa:   Mild bowing of the left vocal fold  Within normal limits, with no lesions and straight vibratory margins  Narrow Band Imaging yielded the following:  no additional information    Neurological and Functional Integrity of the Larynx  Left vocal fold is immobile at the paramedian position  Right vocal fold comes to midline to provide incomplete glottic closure  Airway is adequate  Elongation of the vocal folds for pitch increase is WNL, but Ms. Rush demonstrated difficulty with descending pitch glide.    Supraglottic Function and Therapy Probes  Moderate four-way constriction of the supraglottic larynx during connected speech  Therapy probes show   Slightly improved glottic closure with \"ee ee\" glottic coup    Dr. Cuevas and I reviewed this laryngeal exam with Ms. Rush today, and I provided pertinent explanations:  Endoscopic findings are consistent with audio-perceptual assessment and patient Hx/complaints.  her symptoms are accounted for by the immobile left vocal fold at the paramedian position with incomplete glottic closure and moderate four-way constriction of the supraglottic larynx during connected speech.  Because there appears to be a functional component to her symptoms, she would most likely benefit from functional speech therapy.    THERAPEUTIC ACTIVITIES  Today Ms. Rush participated in the following therapeutic activities:  Asked many questions about the nature of her symptoms, and I answered all of these thoroughly.  I provided instruction for the chin tuck to promote a safe swallow when drinking and eating.   I provided an after visit summary to help facilitate practice.    ASSESSMENT / PLAN  IMPRESSIONS:  This evaluation has resulted in the following diagnosis/diagnoses for Ms. 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    Laryngeal evaluation demonstrated left vocal fold is immobile at the paramedian position, right vocal fold comes to midline to provide incomplete glottic closure, mild bowing of the left vocal fold, moderate four-way constriction of the supraglottic larynx during connected speech, slightly improved glottic closure with \"ee ee\" glottic coup, slight puffiness of the posterior commissure, speckled diffuse red discoloration observed throughout the base of tongue, round tissue observed at the right vallecular area, and significant effort observed with elongation of the vocal folds for ascending and descending pitch glide.   Perceptual evaluation demonstrated marked-severe breathiness, marked strain, marked-severe asthenia, intermittent clear voice with laughing, and her volume reduced as the evaluation progressed    STIMULABILITY: results of therapy probes during perceptual and laryngeal evaluation demonstrate improvement with use of glottic coup to promote vocal fold closure  RECOMMENDATIONS:   A course of speech therapy is recommended to optimize vocal technique, improve voice quality and optimize surgical results.  She demonstrates a Good prognosis for improvement given adherence to therapeutic recommendations. Therapeutic   Positive indicators: commitment to process; diagnosis is known to respond to functional treatment;   Negative indicators: none  We briefly began therapy today, working on strategies to promote a safe swallow.  I recommended her to see Dr. Cuevas for swallow evaluation due to her frequently aspirating while eating and due to her significant weight loss.    TREATMENT PLAN  Speech therapy    DURATION/FREQUENCY OF TREATMENT  6 weekly or bi-weekly one-hour sessions, with 1 monthly one-hour follow-up sessions.    GOALS  Patient goal:    To improve and maintain a healthy voice quality  To understand the problem and fix it as much as possible  To have a normal and acceptable voice quality  To " breathe normally and comfortably in all situations    Long-term goal(s): In 9 months, Ms. Rush will:  1.  Report a week of typical activities, in which dysphonia, swallowing, and breathing does not exceed a level of 3 out of 10, 80% of the time  Report a speaking voice quality that is acceptable to her, 90%of the time  Report resolution of symptoms, and use of optimal voice quality and comfort to meet personal, social, and professional needs, 90% of the time during a typical week of vocal activities    This treatment plan was developed with the patient who agreed with the recommendations.    TOTAL SERVICE TIME: 60 minutes  EVALUATION OF VOICE AND RESONANCE (02186)  TREATMENT (50521)  ENDOSCOPIC LARYNGEAL EXAMINATION WITHOUT STROBOSCOPY (95604)  NO CHARGE FACILITY FEE (67230)      Jessica Pandey M.A., CCC-SLP  Speech-Language Pathologist  [facility]  [email or web address]  She/Her/Hers       Speech recognition software may have been used in this documentation; input is reviewed before signature to the best of my ability.       Again, thank you for allowing me to participate in the care of your patient.      Sincerely,    Jessica Pandey, SLP

## 2023-04-20 NOTE — PATIENT INSTRUCTIONS
Hi Ms. Dennis,    It was so nice to meet you. I have messaged the doctors and we are working on trying to get you in soon. I will keep you updated or someone will call you very soon.     Remember to take small sips and bites. [place] your head to tuck chin to your chest when swallowing. This should make it so it is less likely for anything to go down the wrong pipe.     Let me know if you have any other questions.     Jessica Pandey M.A., CCC-SLP  Speech-Language Pathologist  [facility]  [email or web address]  She/Her/Hers

## 2023-04-21 ENCOUNTER — ONCOLOGY VISIT (OUTPATIENT)
Dept: SURGERY | Facility: CLINIC | Age: 68
End: 2023-04-21
Attending: CLINICAL NURSE SPECIALIST
Payer: COMMERCIAL

## 2023-04-21 ENCOUNTER — ANCILLARY PROCEDURE (OUTPATIENT)
Dept: GENERAL RADIOLOGY | Facility: CLINIC | Age: 68
End: 2023-04-21
Attending: CLINICAL NURSE SPECIALIST
Payer: COMMERCIAL

## 2023-04-21 ENCOUNTER — PATIENT OUTREACH (OUTPATIENT)
Dept: OTOLARYNGOLOGY | Facility: CLINIC | Age: 68
End: 2023-04-21

## 2023-04-21 VITALS
DIASTOLIC BLOOD PRESSURE: 88 MMHG | HEART RATE: 69 BPM | BODY MASS INDEX: 24.6 KG/M2 | TEMPERATURE: 97.9 F | WEIGHT: 154.7 LBS | RESPIRATION RATE: 16 BRPM | OXYGEN SATURATION: 100 % | SYSTOLIC BLOOD PRESSURE: 145 MMHG

## 2023-04-21 DIAGNOSIS — R91.1 LUNG NODULE: ICD-10-CM

## 2023-04-21 DIAGNOSIS — M79.10 MYALGIA: Primary | ICD-10-CM

## 2023-04-21 DIAGNOSIS — C34.82 MALIGNANT NEOPLASM OF OVERLAPPING SITES OF LEFT LUNG (H): ICD-10-CM

## 2023-04-21 LAB
ANION GAP SERPL CALCULATED.3IONS-SCNC: 8 MMOL/L (ref 7–15)
BASOPHILS # BLD AUTO: 0.1 10E3/UL (ref 0–0.2)
BASOPHILS NFR BLD AUTO: 1 %
BUN SERPL-MCNC: 14.7 MG/DL (ref 8–23)
CALCIUM SERPL-MCNC: 9.1 MG/DL (ref 8.8–10.2)
CHLORIDE SERPL-SCNC: 99 MMOL/L (ref 98–107)
CREAT SERPL-MCNC: 1.11 MG/DL (ref 0.51–0.95)
CRP SERPL-MCNC: <3 MG/L
DEPRECATED HCO3 PLAS-SCNC: 25 MMOL/L (ref 22–29)
EOSINOPHIL # BLD AUTO: 0.4 10E3/UL (ref 0–0.7)
EOSINOPHIL NFR BLD AUTO: 8 %
ERYTHROCYTE [DISTWIDTH] IN BLOOD BY AUTOMATED COUNT: 11.9 % (ref 10–15)
ERYTHROCYTE [SEDIMENTATION RATE] IN BLOOD BY WESTERGREN METHOD: 16 MM/HR (ref 0–30)
GFR SERPL CREATININE-BSD FRML MDRD: 54 ML/MIN/1.73M2
GLUCOSE SERPL-MCNC: 103 MG/DL (ref 70–99)
HCT VFR BLD AUTO: 35.1 % (ref 35–47)
HGB BLD-MCNC: 11.6 G/DL (ref 11.7–15.7)
IMM GRANULOCYTES # BLD: 0 10E3/UL
IMM GRANULOCYTES NFR BLD: 0 %
LDH SERPL L TO P-CCNC: 168 U/L (ref 0–250)
LYMPHOCYTES # BLD AUTO: 1.9 10E3/UL (ref 0.8–5.3)
LYMPHOCYTES NFR BLD AUTO: 33 %
MCH RBC QN AUTO: 31 PG (ref 26.5–33)
MCHC RBC AUTO-ENTMCNC: 33 G/DL (ref 31.5–36.5)
MCV RBC AUTO: 94 FL (ref 78–100)
MONOCYTES # BLD AUTO: 0.7 10E3/UL (ref 0–1.3)
MONOCYTES NFR BLD AUTO: 12 %
NEUTROPHILS # BLD AUTO: 2.6 10E3/UL (ref 1.6–8.3)
NEUTROPHILS NFR BLD AUTO: 46 %
NRBC # BLD AUTO: 0 10E3/UL
NRBC BLD AUTO-RTO: 0 /100
PLATELET # BLD AUTO: 339 10E3/UL (ref 150–450)
POTASSIUM SERPL-SCNC: 3.7 MMOL/L (ref 3.4–5.3)
RBC # BLD AUTO: 3.74 10E6/UL (ref 3.8–5.2)
SODIUM SERPL-SCNC: 132 MMOL/L (ref 136–145)
WBC # BLD AUTO: 5.7 10E3/UL (ref 4–11)

## 2023-04-21 PROCEDURE — 86038 ANTINUCLEAR ANTIBODIES: CPT | Performed by: CLINICAL NURSE SPECIALIST

## 2023-04-21 PROCEDURE — 86431 RHEUMATOID FACTOR QUANT: CPT | Performed by: CLINICAL NURSE SPECIALIST

## 2023-04-21 PROCEDURE — G0463 HOSPITAL OUTPT CLINIC VISIT: HCPCS | Performed by: CLINICAL NURSE SPECIALIST

## 2023-04-21 PROCEDURE — 36415 COLL VENOUS BLD VENIPUNCTURE: CPT | Performed by: CLINICAL NURSE SPECIALIST

## 2023-04-21 PROCEDURE — 99024 POSTOP FOLLOW-UP VISIT: CPT | Performed by: CLINICAL NURSE SPECIALIST

## 2023-04-21 PROCEDURE — 71046 X-RAY EXAM CHEST 2 VIEWS: CPT | Performed by: RADIOLOGY

## 2023-04-21 PROCEDURE — 85025 COMPLETE CBC W/AUTO DIFF WBC: CPT | Performed by: CLINICAL NURSE SPECIALIST

## 2023-04-21 PROCEDURE — 83615 LACTATE (LD) (LDH) ENZYME: CPT | Performed by: CLINICAL NURSE SPECIALIST

## 2023-04-21 PROCEDURE — 80048 BASIC METABOLIC PNL TOTAL CA: CPT | Performed by: CLINICAL NURSE SPECIALIST

## 2023-04-21 PROCEDURE — 85652 RBC SED RATE AUTOMATED: CPT | Performed by: CLINICAL NURSE SPECIALIST

## 2023-04-21 PROCEDURE — 86140 C-REACTIVE PROTEIN: CPT | Performed by: CLINICAL NURSE SPECIALIST

## 2023-04-21 RX ORDER — GABAPENTIN 100 MG/1
100 CAPSULE ORAL 3 TIMES DAILY
Qty: 90 CAPSULE | Refills: 0 | Status: SHIPPED | OUTPATIENT
Start: 2023-04-21 | End: 2023-05-21

## 2023-04-21 ASSESSMENT — PAIN SCALES - GENERAL: PAINLEVEL: SEVERE PAIN (6)

## 2023-04-21 NOTE — NURSING NOTE
"Oncology Rooming Note    April 21, 2023 12:52 PM   Sonny Rush is a 67 year old female who presents for:    Chief Complaint   Patient presents with     Oncology Clinic Visit     RTN for Adenocarcinoma of Lung     Initial Vitals: Blood Pressure (Abnormal) 145/88   Pulse 69   Temperature 97.9  F (36.6  C) (Oral)   Respiration 16   Weight 70.2 kg (154 lb 11.2 oz)   Oxygen Saturation 100%   Body Mass Index 24.60 kg/m   Estimated body mass index is 24.6 kg/m  as calculated from the following:    Height as of 3/24/23: 1.689 m (5' 6.5\").    Weight as of this encounter: 70.2 kg (154 lb 11.2 oz). Body surface area is 1.81 meters squared.  Severe Pain (6) Comment: Data Unavailable   No LMP recorded. Patient is postmenopausal.  Allergies reviewed: Yes  Medications reviewed: Yes    Medications: Medication refills not needed today.  Pharmacy name entered into [email or web address]:    [id number] [place], [place] - [address] [id number] [place], [place] - [address] AT [address]    Clinical concerns:  Pt has a tight feeling around her chest. She said that on her left side it almost feels like she has a sliced open wound but nothing is there.       Funmilayorishi John MA            "

## 2023-04-21 NOTE — LETTER
4/21/2023         RE: Sonny Rush  [address]  [facility] [address]        Dear Colleague,    Thank you for referring your patient, Sonny Rush, to the [facility] CANCER CLINIC. Please see a copy of my visit note below.    THORACIC SURGERY FOLLOW UP VISIT    I saw Mrs. Rush in follow-up today. The clinical summary follows:     PREOP DIAGNOSIS   Cavitary nodule of the left lower lobe of lung  PROCEDURE   Robot assisted thoracoscopic left lower lobe wedge resection, completion left lower lobectomy, mediastinal lymph node dissection    DATE OF PROCEDURE  03/24/2023    HISTOPATHOLOGY   Invasive mucinous adenocarcinoma, pT2    COMPLICATIONS  None    INTERVAL STUDIES  CXR: Continued left pleural effusion. Resolution of previous right lung base effusion/thickening. Continued left hemithorax volume loss.    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     HTN (hypertension)     Hypothyroidism     Malignant neoplasm of left lung, unspecified part of lung (H)     Meniere's disease     Migraines     ON NEURONTIN, sees neurologist    Mild depression     sees psych    Osteopenia     Palpitations     PMB (postmenopausal bleeding) 01/01/2006    Had  D & C 2/06 showing inactive endometrium    Pulmonary nodules       Past Surgical History:   Procedure Laterality Date    BIOPSY BREAST SEED LOCALIZATION  7/3/2012    Procedure: BIOPSY BREAST SEED LOCALIZATION;  RIGHT BREAST LUMPECTOMY WITH ULTRASOUND SEED LOCALIZATION;  Surgeon: Jaclyn Dalal MD;  Location:  [facility]    BREAST BIOPSY, RT/LT  11/29/00    stereotactic bx right breast--fibrocystic change    COLONOSCOPY N/A 1/30/2017    Procedure: COMBINED COLONOSCOPY, SINGLE OR MULTIPLE BIOPSY/POLYPECTOMY BY BIOPSY;  Surgeon: Muriel Wolff MD;  Location:  GI    DAVINCI LOBECTOMY LUNG Left 3/24/2023     Procedure: LOBECTOMY, LUNG, ROBOT-ASSISTED- wedge resection, COMPLETION LOWER LOBECTOMY, MEDIASTINAL LYMPH NODE DISSECTION;  Surgeon: Tran Hays MD;  Location: [facility] OR    DILATION AND CURETTAGE  2/27/06    inactive endometrium on path; done for PMB    ENT SURGERY      ear tubes, rhinoplasty    LAPAROSCOPY      Age 27 for pelvic pain    ORTHOPEDIC SURGERY      yaya Dennis is still struggling with pain around her chest-approximately at the bra line. She does get some relief with the use of the muscle relaxer and a heating pad. She is unable to lie flat yet so her sleep is also not optimal thus she is still very fatiqued. She is trying to be active around the house but gets tired quickly. Her voice is very hoarse and slight. She is seeing Dr. Cuevas with ENT on June 1 for a vocal cord injection.     She has all over body aches and pain that she has had prior to surgery. It seems that this has been exacerbated by the recent surgery. She is also reporting migraines nearly every day. Prior to surgery, she would get symptoms that resembled the flu-body aches, joint pain, etc. She says she was told she has fibromyalgia. She has never seen a Pain Specialist nor does she recall every being evaluated for a possible auto-immune disorder.    OBJECTIVE  BP (!) 145/88   Pulse 69   Temp 97.9  F (36.6  C) (Oral)   Resp 16   Wt 70.2 kg (154 lb 11.2 oz)   SpO2 100%   BMI 24.60 kg/m      Her incisions are healing nicely and without erythema.    IMPRESSION   67 year-old female status post left lower lobe wedge resection, completion left lower lobectomy, and mediastinal lymph node dissection for a pT2N0 (stage IB) non small cell lung cancer. She is here today for post operative follow up.    Given her immune symptoms when she has a pain flare up, I am curious if she perhaps some underlying autoimmune disorder. I will check CRP, ESR, DREAD, Rheumatoid factor and LDH today. If these are abnormal, I will refer her  to Rheumatology for further evaluation. If they are normal or not suggestive of an autoimmune process, I will refer her to a chronic Pain Specialist.    I will have her go up to 3 times a day on her gabapentin (currently taking it once a day). This may help with her neuropathic pain and may also help relieve her migraines. I explained the need to take this as scheduled and not randomly because it takes a couple of weeks for the gabapentin to reach a therapeutic level in the bloodstream.    PLAN  I spent 40 min on the date of the encounter in chart review, patient visit, review of tests, documentation and/or discussion with other providers about the issues documented above. I reviewed the plan as follows:  Further oncology follow up per Dr. Alves  Labs today  Refill gabapentin with new dosing instructions  Follow up with Thoracic Surgery as needed    All questions were answered and the patient and present family were in agreement with the plan.  I appreciate the opportunity to participate in the care of your patient and will keep you updated.  Sincerely,      KASH Landeros CNS

## 2023-04-21 NOTE — TELEPHONE ENCOUNTER
FUTURE VISIT INFORMATION      FUTURE VISIT INFORMATION:    Date: 6/1/23    Time: 4PM    [place] [facility] [place] INFORMATION:    Referring provider: Jessica Pandey SLP    Referring providers clinic:  VOICE    Reason for visit/diagnosis  referral from Jessica Gonzalez    RECORDS REQUESTED FROM:       Clinic name Comments Records Status Imaging Status   VOICE 4/19/23- Jessica Pandey SLP    9/22/22- PFT  Epic     [facility] 4/18/23- NOTE WITH Hany Templeton MD Epic     IMAGING  4/28/23- CT CHEST - SCHEDULE   4/21/23 - XR CHEST   3/30/23- CT CHEST   3/30/23- XR CHEST  [facility]  PACS

## 2023-04-21 NOTE — NURSING NOTE
Labs completed via venipuncture, patient discharged.    See flow sheets.    Carmela Agudelo CMA ([facility])

## 2023-04-21 NOTE — PROGRESS NOTES
Called patient to offer appointment related to referral from voice SLP. Left message with direct number, will follow up on MyChart. .Romelia Horton RN on 4/21/2023 at 1:05 PM

## 2023-04-24 LAB
ANA SER QL IF: NEGATIVE
RHEUMATOID FACT SER NEPH-ACNC: <7 IU/ML

## 2023-04-27 DIAGNOSIS — M79.10 MYALGIA: Primary | ICD-10-CM

## 2023-04-28 ENCOUNTER — ANCILLARY PROCEDURE (OUTPATIENT)
Dept: CT IMAGING | Facility: CLINIC | Age: 68
End: 2023-04-28
Attending: INTERNAL MEDICINE
Payer: COMMERCIAL

## 2023-04-28 ENCOUNTER — ANCILLARY PROCEDURE (OUTPATIENT)
Dept: MRI IMAGING | Facility: CLINIC | Age: 68
End: 2023-04-28
Attending: INTERNAL MEDICINE
Payer: COMMERCIAL

## 2023-04-28 DIAGNOSIS — C34.82 MALIGNANT NEOPLASM OF OVERLAPPING SITES OF LEFT LUNG (H): ICD-10-CM

## 2023-04-28 PROCEDURE — 74177 CT ABD & PELVIS W/CONTRAST: CPT

## 2023-04-28 PROCEDURE — 255N000002 HC RX 255 OP 636: Performed by: INTERNAL MEDICINE

## 2023-04-28 PROCEDURE — A9585 GADOBUTROL INJECTION: HCPCS | Performed by: INTERNAL MEDICINE

## 2023-04-28 PROCEDURE — 70553 MRI BRAIN STEM W/O & W/DYE: CPT

## 2023-04-28 RX ORDER — GADOBUTROL 604.72 MG/ML
7.5 INJECTION INTRAVENOUS ONCE
Status: COMPLETED | OUTPATIENT
Start: 2023-04-28 | End: 2023-04-28

## 2023-04-28 RX ADMIN — IOHEXOL 100 ML: 350 INJECTION, SOLUTION INTRAVENOUS at 12:14

## 2023-04-28 RX ADMIN — GADOBUTROL 7.5 ML: 604.72 INJECTION INTRAVENOUS at 13:10

## 2023-04-30 NOTE — PROGRESS NOTES
CC:  NSCLC T2N0 (adenocarcinoma)     HPI: Ms Rush experienced a fall at home in Sept 2022 for which she presented to hospital. She underwent CT scan which identified a left lower lung cavitary nodule. She reports a dry cough which she has has for the last 2 years.  On March 24, 2023 she underwent a robotic resection by Dr. Hays which revealed invasive mucinous adenocarcinoma.  The tumor size was 1.6 cm in greatest dimension,however multifocal disease was identified with a single carcinoid tumorlet in the resction specimen.  Visceral pleural invasion was identified, but no lymphovascular invasion was not identified.  AJCC Pathologic Stage: pT2 (m), N0.  PD-L1 was less than 1%.  This sample was positive for a CD74::ROS1 fusion.     The patient still feels fatigued after surgery.  I spent over 30 minutes discussing with the patient that her tumor was small and may not benefit from adjuvant chemotherapy.  We also discussed the possibility of adjuvant immunotherapy.  The recent PEARLS study has not definitively proven the value of Keytruda In the PD-L1 negative population but there was a trend towards benefit.    I told the patient that her stage was likely stage 1B with visceral pleural involvement, although there was a tumor islet seen.  This was identified as a carcinoid islet and thus not clearly the same histology.    A seperate tumor nodule makes the cancer a Stage 2 tumor.  I will discuss this with Perry Saunders Patel, and Donna at conference.    ROS:    10 point ROS as above.    LAB:    Labs are unremarkable although the patient had persistent anemia after surgery, this has resolved.    CBC RESULTS: Recent Labs   Lab Test 05/01/23  1512   WBC 7.1   RBC 3.80   HGB 11.8   HCT 35.0   MCV 92   MCH 31.1   MCHC 33.7   RDW 11.9        Last Comprehensive Metabolic Panel:  Sodium   Date Value Ref Range Status   05/01/2023 132 (L) 136 - 145 mmol/L Final   02/07/2007 142 133 - 144 mmol/L Final     Potassium    Date Value Ref Range Status   05/01/2023 3.8 3.4 - 5.3 mmol/L Final   12/13/2022 3.7 3.4 - 5.3 mmol/L Final   02/07/2007 3.2 (L) 3.4 - 5.3 mmol/L Final     Potassium POCT   Date Value Ref Range Status   03/24/2023 3.5 3.5 - 5.0 mmol/L Final     Chloride   Date Value Ref Range Status   05/01/2023 95 (L) 98 - 107 mmol/L Final   12/13/2022 94 94 - 109 mmol/L Final   02/07/2007 106 94 - 109 mmol/L Final     Chloride (External)   Date Value Ref Range Status   10/28/2022 96 96 - 106 mmol/L Final     Carbon Dioxide   Date Value Ref Range Status   02/07/2007 28 20 - 32 mmol/L Final     Carbon Dioxide (CO2)   Date Value Ref Range Status   05/01/2023 28 22 - 29 mmol/L Final   12/13/2022 28 20 - 32 mmol/L Final     Anion Gap   Date Value Ref Range Status   05/01/2023 9 7 - 15 mmol/L Final   12/13/2022 8 3 - 14 mmol/L Final   02/07/2007 8 6 - 17 mmol/L Final     Glucose   Date Value Ref Range Status   05/01/2023 126 (H) 70 - 99 mg/dL Final   12/13/2022 110 (H) 70 - 99 mg/dL Final   02/07/2007 90 60 - 110 mg/dL Final     GLUCOSE BY METER POCT   Date Value Ref Range Status   03/30/2023 99 70 - 99 mg/dL Final     Urea Nitrogen   Date Value Ref Range Status   05/01/2023 16.7 8.0 - 23.0 mg/dL Final   12/13/2022 22 7 - 30 mg/dL Final   02/07/2007 15 7 - 30 mg/dL Final     Creatinine   Date Value Ref Range Status   05/01/2023 1.08 (H) 0.51 - 0.95 mg/dL Final   02/07/2007 1.13 0.60 - 1.30 mg/dL Final     GFR Estimate   Date Value Ref Range Status   05/01/2023 55 (L) >60 mL/min/1.73m2 Final     Comment:     eGFR calculated using 2021 CKD-EPI equation.   02/07/2007 54 (L) >60 mL/min/1.7m2 Final     GFR, ESTIMATED POCT   Date Value Ref Range Status   03/30/2023 37 (L) >60 mL/min/1.73m2 Final     Calcium   Date Value Ref Range Status   05/01/2023 9.5 8.8 - 10.2 mg/dL Final   02/07/2007 10.2 8.5 - 10.4 mg/dL Final     Bilirubin Total   Date Value Ref Range Status   05/01/2023 0.3 <=1.2 mg/dL Final   02/07/2007 0.4 0.2 - 1.3 mg/dL Final      Alkaline Phosphatase   Date Value Ref Range Status   05/01/2023 64 35 - 104 U/L Final   02/07/2007 88 40 - 150 U/L Final     ALT   Date Value Ref Range Status   05/01/2023 12 10 - 35 U/L Final   02/07/2007 33 0 - 50 U/L Final     AST   Date Value Ref Range Status   05/01/2023 20 10 - 35 U/L Final   02/07/2007 29 0 - 45 U/L Final     IMAGING:    Recent Results (from the past 744 hour(s))   CT Chest Pulmonary Embolism w Contrast    Narrative    Examination:  CT CHEST PULMONARY EMBOLISM W CONTRAST 3/30/2023 9:37 PM      Comparison: Chest CT 3/16/2023    History: SOB    TECHNIQUE: Volumetric helical acquisition of CT images of the chest  from the lung apices to the kidneys were acquired in arterial phase  after the administration of IV contrast. Three-dimensional (3D)  post-processed angiographic images were reconstructed, archived to  PACS and used in interpretation of this study.     Contrast dose: iopamidol (ISOVUE-370) solution 65 mL    FINDINGS:  Chest:  There is adequate opacification of the main and lobar pulmonary  arteries. No filling defect in the lobar and main segmental pulmonary  arteries to suggest pulmonary embolism.  There is no right-sided heart  strain. The heart is normal. No pericardial effusion. Mild to moderate  coronary artery calcifications. Normal caliber esophagus. Normal  caliber thoracic vasculature. The central airways are patent. Mild  central bronchial wall thickening. Small left and trace right pleural  effusions with associated compressive atelectasis. Small left  pneumothorax. Scattered areas of smooth interlobular septal  thickening. No suspicious pulmonary nodules. No focal airspace  consolidations. Postsurgical changes of mediastinal lymph node  dissection and left lower lobectomy. Subcutaneous emphysema overlying  the left lateral chest wall and overlying the anterior chest wall    Upper abdomen:   No acute findings in the visualized upper abdomen.     Bones:  Multiple chronic  left rib fractures. Degenerative changes spine. No  suspicious or aggressive appearing bone lesions.      Impression    IMPRESSION:   1. No evidence of acute pulmonary embolism.  2. Small left pneumothorax, likely residual from recent left lower  lobectomy.  3. Small left and trace right pleural effusions.  4. Scattered areas of smooth interlobular septal thickening,  suggestive of pulmonary edema.    Imaging findings discussed with Dr. Carroll by Dr. Lee Kline on 9:56  PM on 3/30/2023.    I have personally reviewed the examination and initial interpretation  and I agree with the findings.    DAYNA العراقي MD         SYSTEM ID:  [id number]   XR Chest 2 Views    Narrative    Chest 2 views    INDICATION: Lung nodule    COMPARISON: CT 3/30/2023. Most recent available plain film also  3/30/2023    FINDINGS: Similar to perhaps minimally increased meniscus of the left  costophrenic angle. Volume shift of the mediastinum again to the left.  Right costophrenic angle has sharp and. No pneumothorax. Bony  structures grossly unremarkable.      Impression    IMPRESSION: Continued left pleural effusion. Resolution of previous  right lung base effusion/thickening. Continued left hemithorax volume  loss.    ASHLEY GRAHAM MD         SYSTEM ID:  [id number]   CT Chest/Abdomen/Pelvis w Contrast    Narrative    EXAM: CT CHEST/ABDOMEN/PELVIS W CONTRAST  LOCATION: [facility]  DATE/TIME: 4/28/2023 12:28 PM CDT    INDICATION: Malignant neoplasm of overlapping sites of left lung (H).  COMPARISON: 03/30/2023, PET/CT 01/09/2023.  TECHNIQUE: CT scan of the chest, abdomen, and pelvis was performed following injection of IV contrast. Multiplanar reformats were obtained. Dose reduction techniques were used.   CONTRAST: 100ml Omnipaque 350.    FINDINGS:   LUNGS AND PLEURA: Left lower lobectomy. Small left pleural effusion. The lungs are clear. No pneumothorax.    MEDIASTINUM/AXILLAE: No lymphadenopathy.    CORONARY  ARTERY CALCIFICATION: Mild.    HEPATOBILIARY: 1 cm lesion in the left hepatic lobe is unchanged from previous and was negative on prior PET. No new liver lesions.     PANCREAS: Normal.    SPLEEN: Normal.    ADRENAL GLANDS: Normal.    KIDNEYS/BLADDER: Benign bilateral renal cysts.    BOWEL: Sigmoid colonic diverticulosis.    LYMPH NODES: Normal.    VASCULATURE: Unremarkable.    PELVIC ORGANS: Normal.    MUSCULOSKELETAL: Normal.      Impression    IMPRESSION:  1.  Left lower lobectomy. No evidence of recurrent or metastatic disease in the chest, abdomen, or pelvis.   MR Brain w/o & w Contrast    Narrative    EXAM: MR BRAIN WITHOUT AND WITH CONTRAST  LOCATION: [facility]  DATE/TIME: 04/28/2023, 1:39 PM CDT    INDICATION: Meningioma. History of NSCLC.  COMPARISON: Brain MRI evaluation 10/15/2015.  CONTRAST: 7.5 mL Gadavist.  TECHNIQUE: Routine multiplanar multisequence head MRI without and with intravenous contrast.    FINDINGS:  INTRACRANIAL CONTENTS: Diffusion sequence shows no evidence for acute or subacute infarction. Nothing for restricted diffusion abnormality is present intracranially. Redemonstration of left parasagittal posterior falcine dural-based enhancement   abnormality compatible with stable meningioma best seen series 100 image 155. This is unchanged from prior study, measuring 11 mm AP, 7 mm SI,  6 mm ML. There is no significant mass effect or edema upon the adjacent paramedian left frontal lobe cortex.   There is no additional pathologic enhancement present. No evidence for additional meningiomas, or abnormal dural, leptomeningeal or intraparenchymal enhancement. Dural venous sinus enhancement is otherwise satisfactory. Meckel's cave and cavernous sinus   enhancement is normal. No evidence for CNS metastatic disease in this patient with history of bronchogenic neoplasm type specified non-small cell lung carcinoma. Scattered nonspecific T2/FLAIR hyperintensities within the  cerebral white matter most   consistent with mild chronic microvascular ischemic change. Mild generalized cerebral atrophy. No hydrocephalus. Corpus callosum is normal. Normal position of the cerebellar tonsils. No acute or chronic hemorrhage. No additional mass or mass effect. No   extra-axial blood products or fluid collections.    SELLA: No abnormality accounting for technique.    OSSEOUS STRUCTURES/SOFT TISSUES: No evidence for a marrow infiltrative process. No pathologic diploic space, skull base/clivus or upper cervical signal/enhancement abnormality is present The major intracranial vascular flow-voids are maintained.     ORBITS: No abnormality accounting for technique. No pathologic orbital enhancement.    SINUSES/MASTOIDS: Mucoperiosteal membrane thickening left maxillary sinus and in the anterior ethmoid air cells. Scattered fluid/membrane thickening in the mastoid air cells bilaterally.       Impression    IMPRESSION:  1.  Stable appearance to a small benign-appearing extra-axial left parasagittal posterior falcine area of homogeneous enhancement compatible with a stable and benign extra-axial meningioma, unchanged from 10/15/2015. There is no evidence for mass effect   or edema upon the adjacent parenchyma.    2.  No additional pathologic enhancement is present.    3.  No evidence for CNS metastatic disease.    4.  No evidence for a marrow infiltrative process.    5.  Mild chronic small vessel ischemic changes in the periventricular deep white matter of both cerebral hemispheres and central aleshia, slightly progressive from previous MR evaluation. There is no evidence for recent acute or subacute infarction.    6.  No acute or chronic hemorrhage.    7.  Additional details and full description are given above.    KEY IMAGES: Series 100 image 156, series 13 image 92.         MED:    Current Outpatient Medications   Medication     acetaminophen (TYLENOL) 325 MG tablet     buPROPion (WELLBUTRIN XL) 150 MG 24  hr tablet     busPIRone (BUSPAR) 15 MG tablet     chlorthalidone (HYGROTON) 25 MG tablet     cloNIDine (CATAPRES-TTS1) 0.1 MG/24HR WK patch     gabapentin (NEURONTIN) 100 MG capsule     HydrOXYzine Pamoate (VISTARIL PO)     levothyroxine (SYNTHROID/LEVOTHROID) 88 MCG tablet     losartan (COZAAR) 100 MG tablet     mirtazapine (REMERON) 7.5 MG tablet     ondansetron (ZOFRAN ODT) 8 MG ODT tab     oxyCODONE (ROXICODONE) 5 MG tablet     rizatriptan (MAXALT-MLT) 10 MG ODT     tiZANidine (ZANAFLEX) 2 MG tablet     valACYclovir (VALTREX) 1000 mg tablet     verapamil ER (VERELAN) 180 MG 24 hr capsule     Zoledronic Acid (RECLAST IV)     No current facility-administered medications for this visit.     PE:    Vitals: /81   Pulse 85   Resp 18   Wt 69.9 kg (154 lb)   SpO2 100%   BMI 24.48 kg/m    BMI= Body mass index is 24.48 kg/m .     Cor: NSR    A/P:    1) NSCLC:  The patient has a T2N0 NSCLC, although the presence of a separate lung nodule (tumorlet) would make it T3N0, but the nodule is identified as carcinoid.  Either way, a less than 2 cm tumor is unlikely to benefit from adjuvant therapy.  In addition, a PD-L1 negative tumor is unlikely to benefit from immunotherapy.  The patient tells me she would really like to consider adjuvant therapy rather than observation.  I told her I would discuss further at our conference.    ADDENDUM:    After discussing with Dr. Pereira from Pathology the stage is T2N0.  The multifocal lesion does not qualify as a separate nodule, as it is an identical histology.  The carcinoid lesion is not considered malignant.    I spent over 40 minutes in discussion with my colleagues and the patient and reviewing her studies.

## 2023-05-01 ENCOUNTER — ONCOLOGY VISIT (OUTPATIENT)
Dept: ONCOLOGY | Facility: CLINIC | Age: 68
End: 2023-05-01
Attending: INTERNAL MEDICINE
Payer: COMMERCIAL

## 2023-05-01 ENCOUNTER — APPOINTMENT (OUTPATIENT)
Dept: LAB | Facility: CLINIC | Age: 68
End: 2023-05-01
Attending: INTERNAL MEDICINE
Payer: COMMERCIAL

## 2023-05-01 VITALS
HEART RATE: 85 BPM | RESPIRATION RATE: 18 BRPM | BODY MASS INDEX: 24.48 KG/M2 | OXYGEN SATURATION: 100 % | SYSTOLIC BLOOD PRESSURE: 131 MMHG | DIASTOLIC BLOOD PRESSURE: 81 MMHG | WEIGHT: 154 LBS

## 2023-05-01 DIAGNOSIS — C34.82 MALIGNANT NEOPLASM OF OVERLAPPING SITES OF LEFT LUNG (H): ICD-10-CM

## 2023-05-01 LAB
ALBUMIN SERPL BCG-MCNC: 4.3 G/DL (ref 3.5–5.2)
ALP SERPL-CCNC: 64 U/L (ref 35–104)
ALT SERPL W P-5'-P-CCNC: 12 U/L (ref 10–35)
ANION GAP SERPL CALCULATED.3IONS-SCNC: 9 MMOL/L (ref 7–15)
AST SERPL W P-5'-P-CCNC: 20 U/L (ref 10–35)
BASOPHILS # BLD AUTO: 0.1 10E3/UL (ref 0–0.2)
BASOPHILS NFR BLD AUTO: 1 %
BILIRUB SERPL-MCNC: 0.3 MG/DL
BUN SERPL-MCNC: 16.7 MG/DL (ref 8–23)
CALCIUM SERPL-MCNC: 9.5 MG/DL (ref 8.8–10.2)
CHLORIDE SERPL-SCNC: 95 MMOL/L (ref 98–107)
CREAT SERPL-MCNC: 1.08 MG/DL (ref 0.51–0.95)
DEPRECATED HCO3 PLAS-SCNC: 28 MMOL/L (ref 22–29)
EOSINOPHIL # BLD AUTO: 0.3 10E3/UL (ref 0–0.7)
EOSINOPHIL NFR BLD AUTO: 4 %
ERYTHROCYTE [DISTWIDTH] IN BLOOD BY AUTOMATED COUNT: 11.9 % (ref 10–15)
GFR SERPL CREATININE-BSD FRML MDRD: 55 ML/MIN/1.73M2
GLUCOSE SERPL-MCNC: 126 MG/DL (ref 70–99)
HCT VFR BLD AUTO: 35 % (ref 35–47)
HGB BLD-MCNC: 11.8 G/DL (ref 11.7–15.7)
IMM GRANULOCYTES # BLD: 0 10E3/UL
IMM GRANULOCYTES NFR BLD: 0 %
LYMPHOCYTES # BLD AUTO: 1.9 10E3/UL (ref 0.8–5.3)
LYMPHOCYTES NFR BLD AUTO: 26 %
MCH RBC QN AUTO: 31.1 PG (ref 26.5–33)
MCHC RBC AUTO-ENTMCNC: 33.7 G/DL (ref 31.5–36.5)
MCV RBC AUTO: 92 FL (ref 78–100)
MONOCYTES # BLD AUTO: 0.8 10E3/UL (ref 0–1.3)
MONOCYTES NFR BLD AUTO: 11 %
NEUTROPHILS # BLD AUTO: 4.1 10E3/UL (ref 1.6–8.3)
NEUTROPHILS NFR BLD AUTO: 58 %
NRBC # BLD AUTO: 0 10E3/UL
NRBC BLD AUTO-RTO: 0 /100
PLATELET # BLD AUTO: 315 10E3/UL (ref 150–450)
POTASSIUM SERPL-SCNC: 3.8 MMOL/L (ref 3.4–5.3)
PROT SERPL-MCNC: 6.7 G/DL (ref 6.4–8.3)
RBC # BLD AUTO: 3.8 10E6/UL (ref 3.8–5.2)
SODIUM SERPL-SCNC: 132 MMOL/L (ref 136–145)
WBC # BLD AUTO: 7.1 10E3/UL (ref 4–11)

## 2023-05-01 PROCEDURE — G0463 HOSPITAL OUTPT CLINIC VISIT: HCPCS | Performed by: INTERNAL MEDICINE

## 2023-05-01 PROCEDURE — 36415 COLL VENOUS BLD VENIPUNCTURE: CPT | Performed by: INTERNAL MEDICINE

## 2023-05-01 PROCEDURE — 85025 COMPLETE CBC W/AUTO DIFF WBC: CPT | Performed by: INTERNAL MEDICINE

## 2023-05-01 PROCEDURE — 99215 OFFICE O/P EST HI 40 MIN: CPT | Performed by: INTERNAL MEDICINE

## 2023-05-01 PROCEDURE — 84155 ASSAY OF PROTEIN SERUM: CPT | Performed by: INTERNAL MEDICINE

## 2023-05-01 PROCEDURE — 82310 ASSAY OF CALCIUM: CPT | Performed by: INTERNAL MEDICINE

## 2023-05-01 ASSESSMENT — PAIN SCALES - GENERAL: PAINLEVEL: NO PAIN (0)

## 2023-05-01 NOTE — LETTER
5/1/2023         RE: Sonny Rush  [address]  [facility] [address]        Dear Colleague,    Thank you for referring your patient, Sonny Rush, to the [facility]. Please see a copy of my visit note below.    CC:  NSCLC T2N0 (adenocarcinoma)     HPI: Ms Rush experienced a fall at home in Sept 2022 for which she presented to hospital. She underwent CT scan which identified a left lower lung cavitary nodule. She reports a dry cough which she has has for the last 2 years.  On March 24, 2023 she underwent a robotic resection by Dr. Hays which revealed invasive mucinous adenocarcinoma.  The tumor size was 1.6 cm in greatest dimension,however multifocal disease was identified with a single tumorlet in the resction specimen.  Visceral pleural invasion was identified, but no lymphovascular invasion was not identified.  AJCC Pathologic Stage: pT2 (m), N0.  PD-L1 was less than 1%.  This sample was positive for a CD74::ROS1 fusion.     The patient still feels fatigued after surgery.  I spent over 30 minutes discussing with the patient that her tumor was small and may not benefit from adjuvant chemotherapy.  We also discussed the possibility of adjuvant immunotherapy.  The recent PEARLS study has not definitively proven the value of Keytruda In the PD-L1 negative population but there was a trend towards benefit.    I told the patient that her stage was likely stage 1B with visceral pleural involvement, although there was a tumor islet seen.  This was identified as a carcinoid islet and thus not clearly the same histology.    A seperate tumor nodule makes the cancer a Stage 2 tumor.  I will discuss this with Perry Saunders Patel, and Donna at conference.    ROS:    10 point ROS as above.    LAB:    Labs are unremarkable although the patient had persistent anemia after surgery, this has resolved.    CBC RESULTS: Recent Labs   Lab Test 05/01/23  1512   WBC 7.1    RBC 3.80   HGB 11.8   HCT 35.0   MCV 92   MCH 31.1   MCHC 33.7   RDW 11.9        Last Comprehensive Metabolic Panel:  Sodium   Date Value Ref Range Status   05/01/2023 132 (L) 136 - 145 mmol/L Final   02/07/2007 142 133 - 144 mmol/L Final     Potassium   Date Value Ref Range Status   05/01/2023 3.8 3.4 - 5.3 mmol/L Final   12/13/2022 3.7 3.4 - 5.3 mmol/L Final   02/07/2007 3.2 (L) 3.4 - 5.3 mmol/L Final     Potassium POCT   Date Value Ref Range Status   03/24/2023 3.5 3.5 - 5.0 mmol/L Final     Chloride   Date Value Ref Range Status   05/01/2023 95 (L) 98 - 107 mmol/L Final   12/13/2022 94 94 - 109 mmol/L Final   02/07/2007 106 94 - 109 mmol/L Final     Chloride (External)   Date Value Ref Range Status   10/28/2022 96 96 - 106 mmol/L Final     Carbon Dioxide   Date Value Ref Range Status   02/07/2007 28 20 - 32 mmol/L Final     Carbon Dioxide (CO2)   Date Value Ref Range Status   05/01/2023 28 22 - 29 mmol/L Final   12/13/2022 28 20 - 32 mmol/L Final     Anion Gap   Date Value Ref Range Status   05/01/2023 9 7 - 15 mmol/L Final   12/13/2022 8 3 - 14 mmol/L Final   02/07/2007 8 6 - 17 mmol/L Final     Glucose   Date Value Ref Range Status   05/01/2023 126 (H) 70 - 99 mg/dL Final   12/13/2022 110 (H) 70 - 99 mg/dL Final   02/07/2007 90 60 - 110 mg/dL Final     GLUCOSE BY METER POCT   Date Value Ref Range Status   03/30/2023 99 70 - 99 mg/dL Final     Urea Nitrogen   Date Value Ref Range Status   05/01/2023 16.7 8.0 - 23.0 mg/dL Final   12/13/2022 22 7 - 30 mg/dL Final   02/07/2007 15 7 - 30 mg/dL Final     Creatinine   Date Value Ref Range Status   05/01/2023 1.08 (H) 0.51 - 0.95 mg/dL Final   02/07/2007 1.13 0.60 - 1.30 mg/dL Final     GFR Estimate   Date Value Ref Range Status   05/01/2023 55 (L) >60 mL/min/1.73m2 Final     Comment:     eGFR calculated using 2021 CKD-EPI equation.   02/07/2007 54 (L) >60 mL/min/1.7m2 Final     GFR, ESTIMATED POCT   Date Value Ref Range Status   03/30/2023 37 (L) >60  mL/min/1.73m2 Final     Calcium   Date Value Ref Range Status   05/01/2023 9.5 8.8 - 10.2 mg/dL Final   02/07/2007 10.2 8.5 - 10.4 mg/dL Final     Bilirubin Total   Date Value Ref Range Status   05/01/2023 0.3 <=1.2 mg/dL Final   02/07/2007 0.4 0.2 - 1.3 mg/dL Final     Alkaline Phosphatase   Date Value Ref Range Status   05/01/2023 64 35 - 104 U/L Final   02/07/2007 88 40 - 150 U/L Final     ALT   Date Value Ref Range Status   05/01/2023 12 10 - 35 U/L Final   02/07/2007 33 0 - 50 U/L Final     AST   Date Value Ref Range Status   05/01/2023 20 10 - 35 U/L Final   02/07/2007 29 0 - 45 U/L Final     IMAGING:    Recent Results (from the past 744 hour(s))   CT Chest Pulmonary Embolism w Contrast    Narrative    Examination:  CT CHEST PULMONARY EMBOLISM W CONTRAST 3/30/2023 9:37 PM      Comparison: Chest CT 3/16/2023    History: SOB    TECHNIQUE: Volumetric helical acquisition of CT images of the chest  from the lung apices to the kidneys were acquired in arterial phase  after the administration of IV contrast. Three-dimensional (3D)  post-processed angiographic images were reconstructed, archived to  PACS and used in interpretation of this study.     Contrast dose: iopamidol (ISOVUE-370) solution 65 mL    FINDINGS:  Chest:  There is adequate opacification of the main and lobar pulmonary  arteries. No filling defect in the lobar and main segmental pulmonary  arteries to suggest pulmonary embolism.  There is no right-sided heart  strain. The heart is normal. No pericardial effusion. Mild to moderate  coronary artery calcifications. Normal caliber esophagus. Normal  caliber thoracic vasculature. The central airways are patent. Mild  central bronchial wall thickening. Small left and trace right pleural  effusions with associated compressive atelectasis. Small left  pneumothorax. Scattered areas of smooth interlobular septal  thickening. No suspicious pulmonary nodules. No focal airspace  consolidations. Postsurgical  changes of mediastinal lymph node  dissection and left lower lobectomy. Subcutaneous emphysema overlying  the left lateral chest wall and overlying the anterior chest wall    Upper abdomen:   No acute findings in the visualized upper abdomen.     Bones:  Multiple chronic left rib fractures. Degenerative changes spine. No  suspicious or aggressive appearing bone lesions.      Impression    IMPRESSION:   1. No evidence of acute pulmonary embolism.  2. Small left pneumothorax, likely residual from recent left lower  lobectomy.  3. Small left and trace right pleural effusions.  4. Scattered areas of smooth interlobular septal thickening,  suggestive of pulmonary edema.    Imaging findings discussed with Dr. Carroll by Dr. Lee Kline on 9:56  PM on 3/30/2023.    I have personally reviewed the examination and initial interpretation  and I agree with the findings.    DAYNA العراقي MD         SYSTEM ID:  [id number]   XR Chest 2 Views    Narrative    Chest 2 views    INDICATION: Lung nodule    COMPARISON: CT 3/30/2023. Most recent available plain film also  3/30/2023    FINDINGS: Similar to perhaps minimally increased meniscus of the left  costophrenic angle. Volume shift of the mediastinum again to the left.  Right costophrenic angle has sharp and. No pneumothorax. Bony  structures grossly unremarkable.      Impression    IMPRESSION: Continued left pleural effusion. Resolution of previous  right lung base effusion/thickening. Continued left hemithorax volume  loss.    ASHLEY GRAHAM MD         SYSTEM ID:  [id number]   CT Chest/Abdomen/Pelvis w Contrast    Narrative    EXAM: CT CHEST/ABDOMEN/PELVIS W CONTRAST  LOCATION: [facility]  DATE/TIME: 4/28/2023 12:28 PM CDT    INDICATION: Malignant neoplasm of overlapping sites of left lung (H).  COMPARISON: 03/30/2023, PET/CT 01/09/2023.  TECHNIQUE: CT scan of the chest, abdomen, and pelvis was performed following injection of IV contrast. Multiplanar  reformats were obtained. Dose reduction techniques were used.   CONTRAST: 100ml Omnipaque 350.    FINDINGS:   LUNGS AND PLEURA: Left lower lobectomy. Small left pleural effusion. The lungs are clear. No pneumothorax.    MEDIASTINUM/AXILLAE: No lymphadenopathy.    CORONARY ARTERY CALCIFICATION: Mild.    HEPATOBILIARY: 1 cm lesion in the left hepatic lobe is unchanged from previous and was negative on prior PET. No new liver lesions.     PANCREAS: Normal.    SPLEEN: Normal.    ADRENAL GLANDS: Normal.    KIDNEYS/BLADDER: Benign bilateral renal cysts.    BOWEL: Sigmoid colonic diverticulosis.    LYMPH NODES: Normal.    VASCULATURE: Unremarkable.    PELVIC ORGANS: Normal.    MUSCULOSKELETAL: Normal.      Impression    IMPRESSION:  1.  Left lower lobectomy. No evidence of recurrent or metastatic disease in the chest, abdomen, or pelvis.   MR Brain w/o & w Contrast    Narrative    EXAM: MR BRAIN WITHOUT AND WITH CONTRAST  LOCATION: [facility]  DATE/TIME: 04/28/2023, 1:39 PM CDT    INDICATION: Meningioma. History of NSCLC.  COMPARISON: Brain MRI evaluation 10/15/2015.  CONTRAST: 7.5 mL Gadavist.  TECHNIQUE: Routine multiplanar multisequence head MRI without and with intravenous contrast.    FINDINGS:  INTRACRANIAL CONTENTS: Diffusion sequence shows no evidence for acute or subacute infarction. Nothing for restricted diffusion abnormality is present intracranially. Redemonstration of left parasagittal posterior falcine dural-based enhancement   abnormality compatible with stable meningioma best seen series 100 image 155. This is unchanged from prior study, measuring 11 mm AP, 7 mm SI,  6 mm ML. There is no significant mass effect or edema upon the adjacent paramedian left frontal lobe cortex.   There is no additional pathologic enhancement present. No evidence for additional meningiomas, or abnormal dural, leptomeningeal or intraparenchymal enhancement. Dural venous sinus enhancement is otherwise  satisfactory. Meckel's cave and cavernous sinus   enhancement is normal. No evidence for CNS metastatic disease in this patient with history of bronchogenic neoplasm type specified non-small cell lung carcinoma. Scattered nonspecific T2/FLAIR hyperintensities within the cerebral white matter most   consistent with mild chronic microvascular ischemic change. Mild generalized cerebral atrophy. No hydrocephalus. Corpus callosum is normal. Normal position of the cerebellar tonsils. No acute or chronic hemorrhage. No additional mass or mass effect. No   extra-axial blood products or fluid collections.    SELLA: No abnormality accounting for technique.    OSSEOUS STRUCTURES/SOFT TISSUES: No evidence for a marrow infiltrative process. No pathologic diploic space, skull base/clivus or upper cervical signal/enhancement abnormality is present The major intracranial vascular flow-voids are maintained.     ORBITS: No abnormality accounting for technique. No pathologic orbital enhancement.    SINUSES/MASTOIDS: Mucoperiosteal membrane thickening left maxillary sinus and in the anterior ethmoid air cells. Scattered fluid/membrane thickening in the mastoid air cells bilaterally.       Impression    IMPRESSION:  1.  Stable appearance to a small benign-appearing extra-axial left parasagittal posterior falcine area of homogeneous enhancement compatible with a stable and benign extra-axial meningioma, unchanged from 10/15/2015. There is no evidence for mass effect   or edema upon the adjacent parenchyma.    2.  No additional pathologic enhancement is present.    3.  No evidence for CNS metastatic disease.    4.  No evidence for a marrow infiltrative process.    5.  Mild chronic small vessel ischemic changes in the periventricular deep white matter of both cerebral hemispheres and central aleshia, slightly progressive from previous MR evaluation. There is no evidence for recent acute or subacute infarction.    6.  No acute or chronic  hemorrhage.    7.  Additional details and full description are given above.    KEY IMAGES: Series 100 image 156, series 13 image 92.         MED:    Current Outpatient Medications   Medication    acetaminophen (TYLENOL) 325 MG tablet    buPROPion (WELLBUTRIN XL) 150 MG 24 hr tablet    busPIRone (BUSPAR) 15 MG tablet    chlorthalidone (HYGROTON) 25 MG tablet    cloNIDine (CATAPRES-TTS1) 0.1 MG/24HR WK patch    gabapentin (NEURONTIN) 100 MG capsule    HydrOXYzine Pamoate (VISTARIL PO)    levothyroxine (SYNTHROID/LEVOTHROID) 88 MCG tablet    losartan (COZAAR) 100 MG tablet    mirtazapine (REMERON) 7.5 MG tablet    ondansetron (ZOFRAN ODT) 8 MG ODT tab    oxyCODONE (ROXICODONE) 5 MG tablet    rizatriptan (MAXALT-MLT) 10 MG ODT    tiZANidine (ZANAFLEX) 2 MG tablet    valACYclovir (VALTREX) 1000 mg tablet    verapamil ER (VERELAN) 180 MG 24 hr capsule    Zoledronic Acid (RECLAST IV)     No current facility-administered medications for this visit.     PE:    Vitals: /81   Pulse 85   Resp 18   Wt 69.9 kg (154 lb)   SpO2 100%   BMI 24.48 kg/m    BMI= Body mass index is 24.48 kg/m .     Cor: NSR    A/P:    1) NSCLC:  The patient has a T2N0 NSCLC, although the presence of a separate lung nodule (tumorlet) would make it T3N0, but the nodule is identified as carcinoid.  Either way, a less than 2 cm tumor is unlikely to benefit from adjuvant therapy.  In addition, a PD-L1 negative tumor is unlikely to benefit from immunotherapy.  The patient tells me she would really like to consider adjuvant therapy rather than observation.  I told her I would discuss further at our conference.    I spent over 40 minutes in discussion with my colleagues and the patient and reviewing her studies.      Sincerely,        Pavel Alves MD

## 2023-05-01 NOTE — NURSING NOTE
"Oncology Rooming Note    May 1, 2023 3:30 PM   Sonny Rush is a 67 year old female who presents for:    Chief Complaint   Patient presents with     Blood Draw     Labs collected from venipuncture by RN. Vitals taken. Checked in for appointment(s).      Oncology Clinic Visit     RTN: Lung cancer     Initial Vitals: /81   Pulse 85   Resp 18   Wt 69.9 kg (154 lb)   SpO2 100%   BMI 24.48 kg/m   Estimated body mass index is 24.48 kg/m  as calculated from the following:    Height as of 3/24/23: 1.689 m (5' 6.5\").    Weight as of this encounter: 69.9 kg (154 lb). Body surface area is 1.81 meters squared.  No Pain (0) Comment: Data Unavailable   No LMP recorded. Patient is postmenopausal.  Allergies reviewed: Yes  Medications reviewed: Yes    Medications: Medication refills not needed today.  Pharmacy name entered into [employer]:    [id number] - [place], [place] - [address] [id number] [place], [place] - [address] AT [address]    Clinical concerns: none    Deonte Miranda            "

## 2023-05-01 NOTE — NURSING NOTE
Chief Complaint   Patient presents with     Blood Draw     Labs collected from venipuncture by RN. Vitals taken. Checked in for appointment(s).      Magaly Maldonado RN

## 2023-05-05 ENCOUNTER — TELEPHONE (OUTPATIENT)
Dept: SURGERY | Facility: CLINIC | Age: 68
End: 2023-05-05
Payer: COMMERCIAL

## 2023-05-05 NOTE — TELEPHONE ENCOUNTER
Call placed to Sonny to discuss gabapentin increase. Pt reports that after a few days of taking medication TID she experienced vertigo and vision changes, which made her fearful of falling (she has fallen down her stairs in the past) and she discontinued med after a few days.    She mentions she plans to schedule an appointment with [place] [facility] to get a second opinion about adjuvant chemo/immunotherapy.     She is able to move around her house without difficulty, but does become fatigued easily. She uses tylenol and muscle relaxers sparingly, has taken two oxycodone over the past two weeks. She takes lorazepam for anxiety.     Writer will follow up with Sabrina HEWITT for any further recommendations. Pt was appreciative of writer's call.    Iris Musa, RN BSN  Thoracic Surgery RN Care Coordinator  [phone]

## 2023-05-08 NOTE — TELEPHONE ENCOUNTER
Writer discussed 5/5 phone call with KASH Galvez. Call placed to Sonny to see if she is interested in referral to pain clinic.    No answer. Voicemail left with [facility]'s direct number, callback requested.    Iris Musa, RN BSN  Thoracic Surgery RN Care Coordinator  [phone]

## 2023-05-08 NOTE — TELEPHONE ENCOUNTER
Pt called back, writer discussed that a referral to pain clinic was placed. Pt stated she is not interested at this time. Writer reviewed that the referral was placed for fibromyalgia flare ups, pt could also discuss any long term issues she feels she is having on her chest. Pt voiced understanding and said she would consider seeing pain clinic.    Iris Musa, RN BSN  Thoracic Surgery RN Care Coordinator  [phone]

## 2023-05-09 ENCOUNTER — ALLIED HEALTH/NURSE VISIT (OUTPATIENT)
Dept: ONCOLOGY | Facility: CLINIC | Age: 68
End: 2023-05-09
Payer: COMMERCIAL

## 2023-05-09 DIAGNOSIS — C34.82 MALIGNANT NEOPLASM OF OVERLAPPING SITES OF LEFT LUNG (H): Primary | ICD-10-CM

## 2023-05-16 ENCOUNTER — TRANSFERRED RECORDS (OUTPATIENT)
Dept: HEALTH INFORMATION MANAGEMENT | Facility: CLINIC | Age: 68
End: 2023-05-16
Payer: COMMERCIAL

## 2023-06-01 ENCOUNTER — PRE VISIT (OUTPATIENT)
Dept: OTOLARYNGOLOGY | Facility: CLINIC | Age: 68
End: 2023-06-01

## 2023-11-16 ENCOUNTER — ANCILLARY PROCEDURE (OUTPATIENT)
Dept: CT IMAGING | Facility: CLINIC | Age: 68
End: 2023-11-16
Attending: INTERNAL MEDICINE
Payer: COMMERCIAL

## 2023-11-16 DIAGNOSIS — C34.32 PRIMARY MALIGNANT NEOPLASM OF BRONCHUS OF LEFT LOWER LOBE (H): ICD-10-CM

## 2023-11-16 LAB
CREAT BLD-MCNC: 1.1 MG/DL (ref 0.5–1)
EGFRCR SERPLBLD CKD-EPI 2021: 54 ML/MIN/1.73M2

## 2023-11-16 PROCEDURE — 74177 CT ABD & PELVIS W/CONTRAST: CPT

## 2023-11-16 PROCEDURE — 82565 ASSAY OF CREATININE: CPT

## 2023-11-16 PROCEDURE — 250N000011 HC RX IP 250 OP 636: Performed by: INTERNAL MEDICINE

## 2023-11-16 PROCEDURE — 250N000009 HC RX 250: Performed by: INTERNAL MEDICINE

## 2023-11-16 RX ORDER — IOPAMIDOL 755 MG/ML
90 INJECTION, SOLUTION INTRAVASCULAR ONCE
Status: COMPLETED | OUTPATIENT
Start: 2023-11-16 | End: 2023-11-16

## 2023-11-16 RX ADMIN — IOPAMIDOL 90 ML: 755 INJECTION, SOLUTION INTRAVENOUS at 10:09

## 2023-11-16 RX ADMIN — SODIUM CHLORIDE 40 ML: 9 INJECTION, SOLUTION INTRAVENOUS at 10:09

## 2024-01-16 ENCOUNTER — LAB REQUISITION (OUTPATIENT)
Dept: LAB | Facility: CLINIC | Age: 69
End: 2024-01-16
Payer: COMMERCIAL

## 2024-01-16 DIAGNOSIS — J32.0 CHRONIC MAXILLARY SINUSITIS: ICD-10-CM

## 2024-01-16 PROCEDURE — 87186 SC STD MICRODIL/AGAR DIL: CPT | Mod: ORL | Performed by: OTOLARYNGOLOGY

## 2024-01-18 LAB — BACTERIA SPEC CULT: ABNORMAL

## 2024-01-27 ENCOUNTER — HEALTH MAINTENANCE LETTER (OUTPATIENT)
Age: 69
End: 2024-01-27

## 2024-05-06 ENCOUNTER — ANCILLARY PROCEDURE (OUTPATIENT)
Dept: CT IMAGING | Facility: CLINIC | Age: 69
End: 2024-05-06
Attending: INTERNAL MEDICINE
Payer: COMMERCIAL

## 2024-05-06 DIAGNOSIS — C34.32: ICD-10-CM

## 2024-05-06 LAB
CREAT BLD-MCNC: 1.3 MG/DL (ref 0.5–1)
EGFRCR SERPLBLD CKD-EPI 2021: 44 ML/MIN/1.73M2

## 2024-05-06 PROCEDURE — 250N000011 HC RX IP 250 OP 636: Performed by: INTERNAL MEDICINE

## 2024-05-06 PROCEDURE — 82565 ASSAY OF CREATININE: CPT

## 2024-05-06 PROCEDURE — 250N000009 HC RX 250: Performed by: INTERNAL MEDICINE

## 2024-05-06 PROCEDURE — 71260 CT THORAX DX C+: CPT

## 2024-05-06 RX ORDER — IOPAMIDOL 755 MG/ML
90 INJECTION, SOLUTION INTRAVASCULAR ONCE
Status: COMPLETED | OUTPATIENT
Start: 2024-05-06 | End: 2024-05-06

## 2024-05-06 RX ADMIN — IOPAMIDOL 90 ML: 755 INJECTION, SOLUTION INTRAVENOUS at 11:43

## 2024-05-06 RX ADMIN — SODIUM CHLORIDE 40 ML: 9 INJECTION, SOLUTION INTRAVENOUS at 11:43

## 2024-05-21 ENCOUNTER — TELEPHONE (OUTPATIENT)
Dept: PULMONOLOGY | Facility: CLINIC | Age: 69
End: 2024-05-21
Payer: COMMERCIAL

## 2024-05-21 NOTE — TELEPHONE ENCOUNTER
----- Message from Freddy Juares, RN sent at 5/21/2024 10:23 AM CDT -----  Regarding: FW: Current Pt wants to be seen at [facility] [place] of location  Please offer follow-up with James or Libra. Next available is fine.  -Shawn  ----- Message -----  From: Wendy Garcia CMA  Sent: 5/21/2024   9:22 AM CDT  To: [facility] Pulmonary  Subject: FW: Current Pt wants to be seen at [facility]#      ----- Message -----  From: Liberty Yen  Sent: 5/21/2024   9:16 AM CDT  To: Onc Adult New Patient Scheduling-; #  Subject: Current Pt wants to be seen at [facility]#    [facility] IP team,    I just had this Pt on the line. She [place] a [place] of [place] [facility] [place] seen at [place] for Follow-up. She wants to change location of care due to ease of access. She was last seen in 2023, so she is  a current Pt.    I tried calling but was put on hold and wasn't able to talk with anyone or transfer the Pt to someone.    Please call Pt to schedule her for RET appts at [facility].    Call Pt at [phone] ok to NADEGE    Thanks, Liberty

## 2024-05-22 NOTE — PROGRESS NOTES
LUNG NODULE & INTERVENTIONAL PULMONARY CLINIC  [facility]     Sonny Rush MRN# [id number]   Age: 68 year old YOB: 1955     Reason for Consultation: Pulmonary nodule    Requesting Physician: Referred MD Marquise  No address on file       Assessment and Plan:    1. Stage T2N0 LLL adenocarcinoma of the lung, s/p LLL lobectomy 3/2023  --Is undergoing post surgery surveillance with her oncologist through [place] [facility], next planned in 6 months. No new concerning nodules.   --Discussed that patients are not typically followed in nodule clinic post lobectomy.    2. RML tree in bud nodules  --Likely infectious or inflammatory, very small amount overall and may be residual from one of her URIs this past winter, will be reevaluated on her repeat scan for above.     3. Congestion, shortness of breath  Intermittent congestion, shortness of breath since her lobectomy, improved with Breo Ellipta (prescribed by outside provider).  No bronchiectasis noted on CT.   --Recommended trial of mucinex with Aerobika (provided today in clinic)  --Can consider nebulizers in the future  --Followup with general pulmonary in 3 months, will obtain full set of PFTs at next visit.     Ca Ramirez MD  Interventional Pulmonology  Department of Pulmonary, Allergy, Critical Care and Sleep Medicine   [facility]           History:     Sonny Rush is a 68 year old female with sig h/o for stage T2N0 pulmonary adenocarcinoma and breast cancer who is here for followup.     Had bad cold December 2023 with nausea, chills, and dizziness. Sick for 10 days. Was sick for 3 times this winter. Received several doses of antibiotics. Most recently received doxycycline but had not started it yet. Starting to feel better without it. Feels there is mucous stuck in her lungs that she can't get out, feels better if she can cough it up. Sometimes is short of breath going up a flight of stairs.     Started on  Genesis Allyson sometime after her lobectomy by outside provider and felt better afterwards.     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  - Tobacco hx: Quit 30 years ago after smoking for one year  - My interpretation of the images relevant for this visit includes: Stable 4mm nodule, new RML tree in bud opacities   - My interpretation of the PFT's relevant for this visit includes: Mild airflow obstruction  with hyperinflation, normal diffusing capacity 9/2022         Allergies:      Allergies   Allergen Reactions    Codeine Sulfate      Cough syrup  Hyperactive, anxiety    Divalproex Sodium      Pill form caused GI disturbance.     Niacin Itching    Prednisone Anxiety          Medications:     Current Outpatient Medications   Medication Sig Dispense Refill    acetaminophen (TYLENOL) 325 MG tablet Take 2 tablets (650 mg) by mouth every 4 hours as needed for other (For optimal non-opioid multimodal pain management to improve pain control.)      buPROPion (WELLBUTRIN XL) 150 MG 24 hr tablet Take 150 mg by mouth every morning      busPIRone (BUSPAR) 15 MG tablet Take 15 mg by mouth 2 times daily      chlorthalidone (HYGROTON) 25 MG tablet Take 25 mg by mouth daily      cloNIDine (CATAPRES-TTS1) 0.1 MG/24HR WK patch Place 1 patch onto the skin once a week Changes on Sat morning      gabapentin (NEURONTIN) 100 MG capsule Take 1 capsule (100 mg) by mouth 3 times daily for 30 days 90 capsule 0    HydrOXYzine Pamoate (VISTARIL PO) Take 25 mg by mouth as needed (Patient not taking: Reported on 2/9/2023)      levothyroxine (SYNTHROID/LEVOTHROID) 88 MCG tablet Take 88 mcg by mouth daily      losartan (COZAAR) 100 MG tablet Take 100 mg by mouth daily      mirtazapine (REMERON) 7.5 MG tablet Take 7.5 mg by mouth nightly as needed (insomnia)      ondansetron (ZOFRAN ODT) 8 MG ODT tab Take 8 mg by mouth every 8  hours as needed for nausea      oxyCODONE (ROXICODONE) 5 MG tablet Take 1 tablet (5 mg) by mouth every 6 hours as needed for severe pain (7-10) 28 tablet 0    rizatriptan (MAXALT-MLT) 10 MG ODT Take 1 tablet by mouth as needed      tiZANidine (ZANAFLEX) 2 MG tablet Take 1-3 tablets by mouth nightly as needed      valACYclovir (VALTREX) 1000 mg tablet Take 1,000 mg by mouth 2 times daily as needed (cold sores)      verapamil ER (VERELAN) 180 MG 24 hr capsule Take 2 capsules (360 mg) by mouth At Bedtime 180 capsule 3    Zoledronic Acid (RECLAST IV) Inject 5 mg into the vein once yearly       No current facility-administered medications for this visit.            Physical Exam:   /81 (BP Location: Left arm, Patient Position: Sitting, Cuff Size: Adult Regular)   Pulse 74   Resp 18   Wt 69.9 kg (154 lb)   SpO2 100%   BMI 24.48 kg/m    Wt Readings from Last 4 Encounters:   05/28/24 69.9 kg (154 lb)   05/01/23 69.9 kg (154 lb)   04/21/23 70.2 kg (154 lb 11.2 oz)   03/24/23 76.2 kg (167 lb 15.9 oz)     General: Well appearing  Lungs: Nonlabored breathing  Neuro: Answering questions appropriately  Psych: Normal affect     Imaging/Lab Data   All laboratory and imaging data reviewed.

## 2024-05-28 ENCOUNTER — ONCOLOGY VISIT (OUTPATIENT)
Dept: PULMONOLOGY | Facility: CLINIC | Age: 69
End: 2024-05-28
Payer: COMMERCIAL

## 2024-05-28 VITALS
HEART RATE: 74 BPM | OXYGEN SATURATION: 100 % | DIASTOLIC BLOOD PRESSURE: 81 MMHG | RESPIRATION RATE: 18 BRPM | WEIGHT: 154 LBS | SYSTOLIC BLOOD PRESSURE: 130 MMHG | BODY MASS INDEX: 24.48 KG/M2

## 2024-05-28 DIAGNOSIS — R06.02 SHORTNESS OF BREATH: Primary | ICD-10-CM

## 2024-05-28 PROCEDURE — 99214 OFFICE O/P EST MOD 30 MIN: CPT | Performed by: STUDENT IN AN ORGANIZED HEALTH CARE EDUCATION/TRAINING PROGRAM

## 2024-05-28 NOTE — NURSING NOTE
Chief Complaint   Patient presents with    Follow Up     Pulmonary nodules       Vitals:    05/28/24 0904   BP: 130/81   BP Location: Left arm   Patient Position: Sitting   Cuff Size: Adult Regular   Pulse: 74   Resp: 18   SpO2: 100%   Weight: 69.9 kg (154 lb)       Body mass index is 24.48 kg/m .

## 2024-08-30 ENCOUNTER — OFFICE VISIT (OUTPATIENT)
Dept: PULMONOLOGY | Facility: CLINIC | Age: 69
End: 2024-08-30
Payer: COMMERCIAL

## 2024-08-30 VITALS
HEART RATE: 81 BPM | OXYGEN SATURATION: 95 % | BODY MASS INDEX: 23.85 KG/M2 | WEIGHT: 150 LBS | DIASTOLIC BLOOD PRESSURE: 88 MMHG | SYSTOLIC BLOOD PRESSURE: 152 MMHG

## 2024-08-30 DIAGNOSIS — R06.02 SHORTNESS OF BREATH: Primary | ICD-10-CM

## 2024-08-30 DIAGNOSIS — R06.02 SHORTNESS OF BREATH: ICD-10-CM

## 2024-08-30 PROCEDURE — 94726 PLETHYSMOGRAPHY LUNG VOLUMES: CPT | Performed by: INTERNAL MEDICINE

## 2024-08-30 PROCEDURE — 99214 OFFICE O/P EST MOD 30 MIN: CPT | Mod: 25 | Performed by: STUDENT IN AN ORGANIZED HEALTH CARE EDUCATION/TRAINING PROGRAM

## 2024-08-30 PROCEDURE — 94060 EVALUATION OF WHEEZING: CPT | Performed by: INTERNAL MEDICINE

## 2024-08-30 PROCEDURE — 94729 DIFFUSING CAPACITY: CPT | Performed by: INTERNAL MEDICINE

## 2024-08-30 RX ORDER — ALBUTEROL SULFATE 90 UG/1
2 AEROSOL, METERED RESPIRATORY (INHALATION) EVERY 6 HOURS PRN
Qty: 18 G | Refills: 3 | Status: SHIPPED | OUTPATIENT
Start: 2024-08-30

## 2024-08-30 RX ORDER — BUDESONIDE, GLYCOPYRROLATE, AND FORMOTEROL FUMARATE 160; 9; 4.8 UG/1; UG/1; UG/1
2 AEROSOL, METERED RESPIRATORY (INHALATION) 2 TIMES DAILY
Qty: 10.7 G | Refills: 5 | Status: SHIPPED | OUTPATIENT
Start: 2024-08-30 | End: 2024-09-16 | Stop reason: SINTOL

## 2024-08-30 ASSESSMENT — PAIN SCALES - GENERAL: PAINLEVEL: NO PAIN (0)

## 2024-08-30 NOTE — PROGRESS NOTES
Sonny Rush comes into clinic today at the request of Dr. Sánchez, Ordering Provider for PFT    This service provided today was under the supervising provider of the day Dr. Sánchez, who was available if needed.    German Paula, RT

## 2024-08-30 NOTE — PATIENT INSTRUCTIONS
Start Breztri twice daily, rinse your mouth out after use. You may use albuterol for worsening shortness of breath or 5-10 minutes prior to activity.

## 2024-08-30 NOTE — PROGRESS NOTES
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 PFTs w/ evidence of small airways disease and air-trapping w/ hyperinflation. I believe her symptoms are consistent with asthma.     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 She has previously seen my colleagues in interventional pulmonary, last by Dr. Ramirez 2024. She was prescribed ICS-LABA (Breo) she used this for 3 months and wasn't sure it helped. Feels as if she has been sick since December. LLANES w/ moderate activity. No SOB at rest. Chronic cough, dry. Occasional nocturnal cough. Has chest tightness. Occasional wheezing. No orthopnea or PND. Thinks she has post-nasal gtt. No GERD. Some seasonal allergies, on loratadine. Grandfather  of lung Ca, smoker.     Smoking: quit ~25 years ago, social smoker, significant second hand smoke exposure  Bird exposure: no             Animal exposure: cats        Inhalation exposure: no                            10 point review of systems negative, aside from that mentioned in HPI.    BP (!) 152/88   Pulse 81   Wt 68 kg (150 lb)   SpO2 95%   BMI 23.85 kg/m    Gen: well-appearing  HEENT: Mallampati IV  Card: RRR  Pulm: clear bilaterally   Abd: soft  MSK: no edema, no acute joint abnormality   Skin: no obvious rash  Psych: normal affect  Neuro: alert and oriented     Labs:  Personally reviewed  Abs eos (2023) - 300     Imaging/Studies: Personally reviewed  PFTs (2024) - normal spirometry, e/o air-trapping w/ hyperinflation, normal DLCO  CT C/A/P (2024) - no e/o metastatic dz, stable postsurgical changes of LLL lobectomy, new small clusters of RML tree-in-bud  nodules  PFTs (9/2022) - mild obstruction, e/o hyperinflation, normal diffusion    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     HTN (hypertension)     Hypothyroidism     Malignant neoplasm of left lung, unspecified part of lung (H)     Meniere's disease     Migraines     ON NEURONTIN, sees neurologist    Mild depression     sees psych    Osteopenia     Palpitations     PMB (postmenopausal bleeding) 01/01/2006    Had  D & C 2/06 showing inactive endometrium    Pulmonary nodules      Past Surgical History:   Procedure Laterality Date    BIOPSY BREAST SEED LOCALIZATION  7/3/2012    Procedure: BIOPSY BREAST SEED LOCALIZATION;  RIGHT BREAST LUMPECTOMY WITH ULTRASOUND SEED LOCALIZATION;  Surgeon: Jaclyn Dalal MD;  Location:  [facility]    BREAST BIOPSY, RT/LT  11/29/00    stereotactic bx right breast--fibrocystic change    COLONOSCOPY N/A 1/30/2017    Procedure: COMBINED COLONOSCOPY, SINGLE OR MULTIPLE BIOPSY/POLYPECTOMY BY BIOPSY;  Surgeon: Muriel Wolff MD;  Location:  GI    DAVINCI LOBECTOMY LUNG Left 3/24/2023    Procedure: LOBECTOMY, LUNG, ROBOT-ASSISTED- wedge resection, COMPLETION LOWER LOBECTOMY, MEDIASTINAL LYMPH NODE DISSECTION;  Surgeon: Tran Hays MD;  Location: [facility] OR    DILATION AND CURETTAGE  2/27/06    inactive endometrium on path; done for PMB    ENT SURGERY      ear tubes, rhinoplasty    LAPAROSCOPY      Age 27 for pelvic pain    ORTHOPEDIC SURGERY      austinertoandriy     Family History   Problem Relation Age of Onset    Hyperlipidemia Father     Hypertension Father     Heart Surgery Father     Anesthesia Reaction Father         delirium after CABG    Bone Cancer Paternal Grandmother         jaw bone cancer    No Known Problems Paternal Grandfather         Lung cancer    Breast Cancer Other         aunt    Ovarian Cancer Other     Uterine Cancer Other     Venous thrombosis No  family hx of      Social History     Socioeconomic History    Marital status:      Spouse name: Virgilio    Number of children: 0    Years of education: Not on file    Highest education level: Not on file   Occupational History    Occupation:      Employer: [employer]   Tobacco Use    Smoking status: Former     Current packs/day: 0.00     Types: Cigarettes     Quit date:      Years since quittin.6    Smokeless tobacco: Never    Tobacco comments:     Smoked socially    Substance and Sexual Activity    Alcohol use: Yes     Comment: occasional    Drug use: No    Sexual activity: Never     Partners: Male     Birth control/protection: Post-menopausal   Other Topics Concern    Parent/sibling w/ CABG, MI or angioplasty before 65F 55M? Not Asked   Social History Narrative     -  is 10years younger.    Prior smoker.    Lives in [facility].    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    No children.     is chastity.     Social Determinants of Health     Financial Resource Strain: Not on file   Food Insecurity: Not on file   Transportation Needs: Not on file   Physical Activity: Not on file   Stress: Not on file   Social Connections: Not on file   Interpersonal Safety: Not on file   Housing Stability: Not on file       35 minutes spent reviewing chart, reviewing test results, talking with and examining patient, formulating plan, and documentation on the day of the encounter.    Teddy Sánchez MD  Pulmonary and Critical Care Medicine  [facility]

## 2024-08-30 NOTE — LETTER
2024      Sonny Rush  [address]  [place] [address]      Dear Colleague,    Thank you for referring your patient, Sonny Rush, to the [facility] [place]. Please see a copy of my visit note below.    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 PFTs w/ evidence of small airways disease and air-trapping w/ hyperinflation. I believe her symptoms are consistent with asthma.     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 She has previously seen my colleagues in interventional pulmonary, last by Dr. Ramirez 2024. She was prescribed ICS-LABA (Breo) she used this for 3 months and wasn't sure it helped. Feels as if she has been sick since December. LLANES w/ moderate activity. No SOB at rest. Chronic cough, dry. Occasional nocturnal cough. Has chest tightness. Occasional wheezing. No orthopnea or PND. Thinks she has post-nasal gtt. No GERD. Some seasonal allergies, on loratadine. Grandfather  of lung Ca, smoker.     Smoking: quit ~25 years ago, social smoker, significant second hand smoke exposure  Bird exposure: no             Animal exposure: cats        Inhalation exposure: no                            10 point review of systems negative, aside from that mentioned in HPI.    BP (!) 152/88   Pulse 81   Wt 68 kg (150 lb)   SpO2 95%   BMI 23.85 kg/m    Gen: well-appearing  HEENT: Mallampati IV  Card: RRR  Pulm: clear bilaterally   Abd: soft  MSK: no edema, no acute joint abnormality   Skin: no obvious rash  Psych: normal affect  Neuro: alert and oriented     Labs:  Personally reviewed  Abs eos (2023) - 300      Imaging/Studies: Personally reviewed  PFTs (8/2024) - normal spirometry, e/o air-trapping w/ hyperinflation, normal DLCO  CT C/A/P (5/2024) - no e/o metastatic dz, stable postsurgical changes of LLL lobectomy, new small clusters of RML tree-in-bud nodules  PFTs (9/2022) - mild obstruction, e/o hyperinflation, normal diffusion    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      HTN (hypertension)      Hypothyroidism      Malignant neoplasm of left lung, unspecified part of lung (H)      Meniere's disease      Migraines     ON NEURONTIN, sees neurologist     Mild depression     sees psych     Osteopenia      Palpitations      PMB (postmenopausal bleeding) 01/01/2006    Had  D & C 2/06 showing inactive endometrium     Pulmonary nodules      Past Surgical History:   Procedure Laterality Date     BIOPSY BREAST SEED LOCALIZATION  7/3/2012    Procedure: BIOPSY BREAST SEED LOCALIZATION;  RIGHT BREAST LUMPECTOMY WITH ULTRASOUND SEED LOCALIZATION;  Surgeon: Jaclyn Dalal MD;  Location:  [facility]     BREAST BIOPSY, RT/LT  11/29/00    stereotactic bx right breast--fibrocystic change     COLONOSCOPY N/A 1/30/2017    Procedure: COMBINED COLONOSCOPY, SINGLE OR MULTIPLE BIOPSY/POLYPECTOMY BY BIOPSY;  Surgeon: Muriel Wolff MD;  Location:  GI     DAVINCI LOBECTOMY LUNG Left 3/24/2023    Procedure: LOBECTOMY, LUNG, ROBOT-ASSISTED- wedge resection, COMPLETION LOWER LOBECTOMY, MEDIASTINAL LYMPH NODE DISSECTION;  Surgeon: Tran Hays MD;  Location: [facility]     DILATION AND CURETTAGE  2/27/06    inactive endometrium on path; done for PMB     ENT SURGERY      ear tubes, rhinoplasty     LAPAROSCOPY      Age 27 for pelvic pain     ORTHOPEDIC SURGERY      yaya     Family History   Problem Relation Age of Onset     Hyperlipidemia Father      Hypertension Father      Heart Surgery Father       Anesthesia Reaction Father         delirium after CABG     Bone Cancer Paternal Grandmother         jaw bone cancer     No Known Problems Paternal Grandfather         Lung cancer     Breast Cancer Other         aunt     Ovarian Cancer Other      Uterine Cancer Other      Venous thrombosis No family hx of      Social History     Socioeconomic History     Marital status:      Spouse name: Virgilio     Number of children: 0     Years of education: Not on file     Highest education level: Not on file   Occupational History     Occupation:      Employer: [employer]   Tobacco Use     Smoking status: Former     Current packs/day: 0.00     Types: Cigarettes     Quit date:      Years since quittin.6     Smokeless tobacco: Never     Tobacco comments:     Smoked socially    Substance and Sexual Activity     Alcohol use: Yes     Comment: occasional     Drug use: No     Sexual activity: Never     Partners: Male     Birth control/protection: Post-menopausal   Other Topics Concern     Parent/sibling w/ CABG, MI or angioplasty before 65F 55M? Not Asked   Social History Narrative     -  is 10years younger.    Prior smoker.    Lives in [facility].    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     is chastity.     Social Determinants of Health     Financial Resource Strain: Not on file   Food Insecurity: Not on file   Transportation Needs: Not on file   Physical Activity: Not on file   Stress: Not on file   Social Connections: Not on file   Interpersonal Safety: Not on file   Housing Stability: Not on file       35 minutes spent reviewing chart, reviewing test results, talking with and examining patient, formulating plan, and documentation on the day of the encounter.    Teddy Sánchez MD  Pulmonary and Critical Care Medicine  [facility]       Again, thank you for allowing me to participate in the care of your patient.        Sincerely,        Teddy Sánchez MD

## 2024-09-03 LAB
DLCOUNC-%PRED-PRE: 106 %
DLCOUNC-PRE: 21.05 ML/MIN/MMHG
DLCOUNC-PRED: 19.75 ML/MIN/MMHG
ERV-%PRED-PRE: 114 %
ERV-PRE: 1.2 L
ERV-PRED: 1.05 L
EXPTIME-PRE: 6.41 SEC
FEF2575-%PRED-POST: 90 %
FEF2575-%PRED-PRE: 82 %
FEF2575-POST: 1.64 L/SEC
FEF2575-PRE: 1.48 L/SEC
FEF2575-PRED: 1.81 L/SEC
FEFMAX-%PRED-PRE: 92 %
FEFMAX-PRE: 5.42 L/SEC
FEFMAX-PRED: 5.84 L/SEC
FEV1-%PRED-PRE: 111 %
FEV1-PRE: 2.4 L
FEV1FEV6-PRE: 67 %
FEV1FEV6-PRED: 79 %
FEV1FVC-PRE: 67 %
FEV1FVC-PRED: 79 %
FEV1SVC-PRE: 75 %
FEV1SVC-PRED: 66 %
FIFMAX-PRE: 4.48 L/SEC
FRCPLETH-%PRED-PRE: 155 %
FRCPLETH-PRE: 4.37 L
FRCPLETH-PRED: 2.81 L
FVC-%PRED-PRE: 129 %
FVC-PRE: 3.57 L
FVC-PRED: 2.75 L
IC-%PRED-PRE: 95 %
IC-PRE: 2.01 L
IC-PRED: 2.1 L
RVPLETH-%PRED-PRE: 146 %
RVPLETH-PRE: 3.17 L
RVPLETH-PRED: 2.16 L
TLCPLETH-%PRED-PRE: 121 %
TLCPLETH-PRE: 6.37 L
TLCPLETH-PRED: 5.23 L
VA-%PRED-PRE: 103 %
VA-PRE: 4.99 L
VC-%PRED-PRE: 97 %
VC-PRE: 3.2 L
VC-PRED: 3.27 L

## 2024-09-12 ENCOUNTER — MYC MEDICAL ADVICE (OUTPATIENT)
Dept: PULMONOLOGY | Facility: CLINIC | Age: 69
End: 2024-09-12
Payer: COMMERCIAL

## 2024-09-12 DIAGNOSIS — R06.02 SHORTNESS OF BREATH: Primary | ICD-10-CM

## 2024-09-12 NOTE — TELEPHONE ENCOUNTER
Pt is calling to speak to nurse. Pt checked with insurance and Trelegy inhaler is over $100 a month. Pt would like a call back at ph: [phone].

## 2024-09-12 NOTE — TELEPHONE ENCOUNTER
[facility] Call Center    Phone Message    May a detailed message be left on voicemail: yes     Reason for Call: Symptoms or Concerns     If patient has red-flag symptoms, warm transfer to triage line    Current symptom or concern: Increase in blood pressure since starting the -budeson-glycopyrrol-formoterol (BREZTRI AEROSPHERE) 160-9-4.8 MCG/ACT AERO inhaler due this  patient has stop taking medication as of 9/8/24- would like to speak to Dr. Sánchez about this asap having trouble getting blood pressure back regulated.     Symptoms have been present for:  1 week(s)      Action Taken: Other: PULM     Travel Screening: Not Applicable     Date of Service:

## 2024-09-12 NOTE — TELEPHONE ENCOUNTER
RN called patient to follow-up on message. Patient was last seen by Dr Sánchez on 8/30/24 and was started on Breztri for asthma. Patient states shortly after starting Breztri, her SBP increased to 170 while at rest. SBP then stayed around 150. After stopping Breztri, SBP returned to 120-130 which patient reports is baseline. Patient has allergy to prednisone, reaction per chart is anxiety. Patient is wondering if alternative inhaler can be prescribed. Trelegy appears covered per EPIC. Pended Rx and routed to Dr Sánchez to review. Patient advised to follow-up with PCP if wanting BP med adjustments. Patient verbalized understanding.    Freddy Juares RN

## 2024-09-12 NOTE — TELEPHONE ENCOUNTER
Returned call to patient. Provided update on different alternatives and sent patient a MCM with alternatives. She will follow-up with her insurance and let us know which inhaler is best covered for her.    Freddy Juares RN

## 2024-09-13 RX ORDER — FLUTICASONE PROPIONATE AND SALMETEROL 232; 14 UG/1; UG/1
1 POWDER, METERED RESPIRATORY (INHALATION) 2 TIMES DAILY
Qty: 1 EACH | Refills: 5 | Status: SHIPPED | OUTPATIENT
Start: 2024-09-13 | End: 2024-09-16

## 2024-09-16 RX ORDER — FLUTICASONE PROPIONATE AND SALMETEROL 232; 14 UG/1; UG/1
1 POWDER, METERED RESPIRATORY (INHALATION) 2 TIMES DAILY
Qty: 3 EACH | Refills: 1 | Status: SHIPPED | OUTPATIENT
Start: 2024-09-16

## 2024-11-11 ENCOUNTER — ANCILLARY PROCEDURE (OUTPATIENT)
Dept: CT IMAGING | Facility: CLINIC | Age: 69
End: 2024-11-11
Attending: INTERNAL MEDICINE
Payer: COMMERCIAL

## 2024-11-11 DIAGNOSIS — C34.32 PRIMARY MALIGNANT NEOPLASM OF BRONCHUS OF LEFT LOWER LOBE (H): ICD-10-CM

## 2024-11-11 LAB
CREAT BLD-MCNC: 1 MG/DL (ref 0.5–1)
EGFRCR SERPLBLD CKD-EPI 2021: 60 ML/MIN/1.73M2

## 2024-11-11 PROCEDURE — 82565 ASSAY OF CREATININE: CPT

## 2024-11-11 PROCEDURE — 74177 CT ABD & PELVIS W/CONTRAST: CPT

## 2024-11-11 PROCEDURE — 250N000011 HC RX IP 250 OP 636: Performed by: INTERNAL MEDICINE

## 2024-11-11 PROCEDURE — 250N000009 HC RX 250: Performed by: INTERNAL MEDICINE

## 2024-11-11 RX ORDER — IOPAMIDOL 755 MG/ML
81 INJECTION, SOLUTION INTRAVASCULAR ONCE
Status: COMPLETED | OUTPATIENT
Start: 2024-11-11 | End: 2024-11-11

## 2024-11-11 RX ADMIN — IOPAMIDOL 81 ML: 755 INJECTION, SOLUTION INTRAVENOUS at 09:46

## 2024-11-11 RX ADMIN — SODIUM CHLORIDE 40 ML: 9 INJECTION, SOLUTION INTRAVENOUS at 09:46

## 2024-11-14 DIAGNOSIS — R06.02 SHORTNESS OF BREATH: ICD-10-CM

## 2024-11-14 RX ORDER — ALBUTEROL SULFATE 90 UG/1
2 INHALANT RESPIRATORY (INHALATION) EVERY 6 HOURS PRN
Qty: 18 G | Refills: 3 | Status: SHIPPED | OUTPATIENT
Start: 2024-11-14

## 2024-11-14 NOTE — TELEPHONE ENCOUNTER
Requested Prescriptions   Pending Prescriptions Disp Refills    albuterol (PROAIR HFA/PROVENTIL HFA/VENTOLIN HFA) 108 (90 Base) MCG/ACT inhaler 18 g 3     Sig: Inhale 2 puffs into the lungs every 6 hours as needed for shortness of breath, wheezing or cough.       Short-Acting Beta Agonist Inhalers Protocol  Passed - 11/14/2024 10:54 AM        Passed - Patient is age 12 or older        Passed - Recent (12 mo) or future (30 days) visit within the authorizing provider's specialty     The patient must have completed an in-person or virtual visit within the past 12 months or has a future visit scheduled within the next 90 days with the authorizing provider s specialty.  Urgent care and e-visits do not quality as an office visit for this protocol.          Passed - Medication is active on med list           Refill sent  Freddy Juares RN

## 2024-11-14 NOTE — TELEPHONE ENCOUNTER
Last Written Prescription Date:  8/30/24  Last Fill Quantity: 18g,  # refills: 3   Last office visit: 8/30/2024 ; last virtual visit: Visit date not found with prescribing provider:  dr quintero   Future Office Visit:           Requested Prescriptions   Pending Prescriptions Disp Refills    albuterol (PROAIR HFA/PROVENTIL HFA/VENTOLIN HFA) 108 (90 Base) MCG/ACT inhaler 18 g 3     Sig: Inhale 2 puffs into the lungs every 6 hours as needed for shortness of breath, wheezing or cough.       There is no refill protocol information for this order

## 2024-11-27 ENCOUNTER — TELEPHONE (OUTPATIENT)
Dept: PULMONOLOGY | Facility: CLINIC | Age: 69
End: 2024-11-27
Payer: COMMERCIAL

## 2024-11-27 NOTE — TELEPHONE ENCOUNTER
Patient is currently using AirDuo with [employer] due to donut hole status. Patient was previously on Trelegy and will let us know if she would like to resume Trelegy at start of the year.    Freddy Juares RN

## 2024-11-27 NOTE — TELEPHONE ENCOUNTER
[facility] Call Center    Phone Message    May a detailed message be left on voicemail: no     Reason for Call: Other: [facility] called to ask if you can have a new medication that will be covered starting 01/25 the fluticasone-salmeterol (AIRDUO RESPICLICK) 232-14 MCG/ACT inhaler  is no longer covered and they said the Generic Advair Diskus or the Symbicort are going ot be covered and wanted to inform the DrKarol Prior to 01/25. If nay questions call back [phone]  and Ex are;[phone]    Action Taken: Message routed to:  Other: pulm    Travel Screening: Not Applicable     Date of Service: 11/27/25

## 2024-12-12 ENCOUNTER — TELEPHONE (OUTPATIENT)
Dept: PULMONOLOGY | Facility: CLINIC | Age: 69
End: 2024-12-12
Payer: COMMERCIAL

## 2024-12-12 NOTE — TELEPHONE ENCOUNTER
[facility] Call Center    Phone Message    May a detailed message be left on voicemail: yes     Reason for Call: Other: Pt is calling about duo inhaler. Pt states insurance will not cover this medication at the beginning of the year. Pt spoke with Dr Sánchez at visit on 12/6 about this. Pt is wondering about an affordable/covered alternative. Please call pt back at ph:   [phone].     Action Taken: Message routed to:  Other: PILAR Pulm     Travel Screening: Not Applicable     Date of Service: 12/12

## 2024-12-12 NOTE — TELEPHONE ENCOUNTER
Called patient and provided update that MCM had been sent with [employer] formulary alternatives. Advised patient to contact insurance to find cheapest alternative and we can place an updated Rx. Patient will update clinic on inhaler choice.    Freddy Juares RN

## 2024-12-27 ENCOUNTER — APPOINTMENT (OUTPATIENT)
Dept: CT IMAGING | Facility: CLINIC | Age: 69
End: 2024-12-27
Attending: PHYSICIAN ASSISTANT
Payer: COMMERCIAL

## 2024-12-27 ENCOUNTER — HOSPITAL ENCOUNTER (EMERGENCY)
Facility: CLINIC | Age: 69
Discharge: HOME | End: 2024-12-27
Attending: PHYSICIAN ASSISTANT | Admitting: PHYSICIAN ASSISTANT
Payer: COMMERCIAL

## 2024-12-27 VITALS
TEMPERATURE: 96.8 F | OXYGEN SATURATION: 98 % | RESPIRATION RATE: 25 BRPM | HEART RATE: 81 BPM | SYSTOLIC BLOOD PRESSURE: 139 MMHG | DIASTOLIC BLOOD PRESSURE: 80 MMHG

## 2024-12-27 DIAGNOSIS — R79.89 ELEVATED SERUM CREATININE: ICD-10-CM

## 2024-12-27 DIAGNOSIS — R00.2 PALPITATIONS: ICD-10-CM

## 2024-12-27 DIAGNOSIS — R06.00 DYSPNEA: ICD-10-CM

## 2024-12-27 DIAGNOSIS — R00.0 SINUS TACHYCARDIA: ICD-10-CM

## 2024-12-27 LAB
ALBUMIN SERPL BCG-MCNC: 4.3 G/DL (ref 3.5–5.2)
ALP SERPL-CCNC: 88 U/L (ref 40–150)
ALT SERPL W P-5'-P-CCNC: 18 U/L (ref 0–50)
ANION GAP SERPL CALCULATED.3IONS-SCNC: 14 MMOL/L (ref 7–15)
AST SERPL W P-5'-P-CCNC: 27 U/L (ref 0–45)
ATRIAL RATE - MUSE: 116 BPM
BASOPHILS # BLD AUTO: 0.1 10E3/UL (ref 0–0.2)
BASOPHILS NFR BLD AUTO: 1 %
BILIRUB SERPL-MCNC: 0.3 MG/DL
BUN SERPL-MCNC: 20.5 MG/DL (ref 8–23)
CALCIUM SERPL-MCNC: 9 MG/DL (ref 8.8–10.4)
CHLORIDE SERPL-SCNC: 98 MMOL/L (ref 98–107)
CREAT SERPL-MCNC: 1.5 MG/DL (ref 0.51–0.95)
DIASTOLIC BLOOD PRESSURE - MUSE: NORMAL MMHG
EGFRCR SERPLBLD CKD-EPI 2021: 37 ML/MIN/1.73M2
EOSINOPHIL # BLD AUTO: 0.2 10E3/UL (ref 0–0.7)
EOSINOPHIL NFR BLD AUTO: 2 %
ERYTHROCYTE [DISTWIDTH] IN BLOOD BY AUTOMATED COUNT: 12.8 % (ref 10–15)
GLUCOSE SERPL-MCNC: 84 MG/DL (ref 70–99)
HCO3 SERPL-SCNC: 23 MMOL/L (ref 22–29)
HCT VFR BLD AUTO: 37.6 % (ref 35–47)
HGB BLD-MCNC: 13.1 G/DL (ref 11.7–15.7)
HOLD SPECIMEN: NORMAL
IMM GRANULOCYTES # BLD: 0 10E3/UL
IMM GRANULOCYTES NFR BLD: 0 %
INTERPRETATION ECG - MUSE: NORMAL
LYMPHOCYTES # BLD AUTO: 2.1 10E3/UL (ref 0.8–5.3)
LYMPHOCYTES NFR BLD AUTO: 32 %
MAGNESIUM SERPL-MCNC: 2.2 MG/DL (ref 1.7–2.3)
MCH RBC QN AUTO: 33.3 PG (ref 26.5–33)
MCHC RBC AUTO-ENTMCNC: 34.8 G/DL (ref 31.5–36.5)
MCV RBC AUTO: 96 FL (ref 78–100)
MONOCYTES # BLD AUTO: 0.8 10E3/UL (ref 0–1.3)
MONOCYTES NFR BLD AUTO: 12 %
NEUTROPHILS # BLD AUTO: 3.5 10E3/UL (ref 1.6–8.3)
NEUTROPHILS NFR BLD AUTO: 52 %
NRBC # BLD AUTO: 0 10E3/UL
NRBC BLD AUTO-RTO: 0 /100
P AXIS - MUSE: 82 DEGREES
PLATELET # BLD AUTO: 328 10E3/UL (ref 150–450)
POTASSIUM SERPL-SCNC: 4.3 MMOL/L (ref 3.4–5.3)
PR INTERVAL - MUSE: 146 MS
PROT SERPL-MCNC: 7 G/DL (ref 6.4–8.3)
QRS DURATION - MUSE: 60 MS
QT - MUSE: 300 MS
QTC - MUSE: 417 MS
R AXIS - MUSE: 12 DEGREES
RBC # BLD AUTO: 3.93 10E6/UL (ref 3.8–5.2)
SODIUM SERPL-SCNC: 135 MMOL/L (ref 135–145)
SYSTOLIC BLOOD PRESSURE - MUSE: NORMAL MMHG
T AXIS - MUSE: 31 DEGREES
TROPONIN T SERPL HS-MCNC: 10 NG/L
TROPONIN T SERPL HS-MCNC: 8 NG/L
TSH SERPL DL<=0.005 MIU/L-ACNC: 1.56 UIU/ML (ref 0.3–4.2)
VENTRICULAR RATE- MUSE: 116 BPM
WBC # BLD AUTO: 6.7 10E3/UL (ref 4–11)

## 2024-12-27 PROCEDURE — 36415 COLL VENOUS BLD VENIPUNCTURE: CPT | Performed by: PHYSICIAN ASSISTANT

## 2024-12-27 PROCEDURE — 84443 ASSAY THYROID STIM HORMONE: CPT | Performed by: PHYSICIAN ASSISTANT

## 2024-12-27 PROCEDURE — 83735 ASSAY OF MAGNESIUM: CPT | Performed by: PHYSICIAN ASSISTANT

## 2024-12-27 PROCEDURE — 82310 ASSAY OF CALCIUM: CPT | Performed by: PHYSICIAN ASSISTANT

## 2024-12-27 PROCEDURE — 258N000003 HC RX IP 258 OP 636: Performed by: PHYSICIAN ASSISTANT

## 2024-12-27 PROCEDURE — 93005 ELECTROCARDIOGRAM TRACING: CPT

## 2024-12-27 PROCEDURE — 250N000011 HC RX IP 250 OP 636: Performed by: PHYSICIAN ASSISTANT

## 2024-12-27 PROCEDURE — 84484 ASSAY OF TROPONIN QUANT: CPT | Performed by: PHYSICIAN ASSISTANT

## 2024-12-27 PROCEDURE — 85004 AUTOMATED DIFF WBC COUNT: CPT | Performed by: PHYSICIAN ASSISTANT

## 2024-12-27 PROCEDURE — 250N000009 HC RX 250: Performed by: PHYSICIAN ASSISTANT

## 2024-12-27 PROCEDURE — 99285 EMERGENCY DEPT VISIT HI MDM: CPT | Mod: 25

## 2024-12-27 PROCEDURE — 85018 HEMOGLOBIN: CPT | Performed by: PHYSICIAN ASSISTANT

## 2024-12-27 PROCEDURE — 71275 CT ANGIOGRAPHY CHEST: CPT

## 2024-12-27 PROCEDURE — 96360 HYDRATION IV INFUSION INIT: CPT | Mod: 59

## 2024-12-27 RX ORDER — IOPAMIDOL 755 MG/ML
60 INJECTION, SOLUTION INTRAVASCULAR ONCE
Status: COMPLETED | OUTPATIENT
Start: 2024-12-27 | End: 2024-12-27

## 2024-12-27 RX ADMIN — SODIUM CHLORIDE 86 ML: 9 INJECTION, SOLUTION INTRAVENOUS at 19:00

## 2024-12-27 RX ADMIN — SODIUM CHLORIDE 1000 ML: 9 INJECTION, SOLUTION INTRAVENOUS at 19:46

## 2024-12-27 RX ADMIN — IOPAMIDOL 60 ML: 755 INJECTION, SOLUTION INTRAVENOUS at 18:59

## 2024-12-27 ASSESSMENT — ACTIVITIES OF DAILY LIVING (ADL)
ADLS_ACUITY_SCORE: 54

## 2024-12-27 NOTE — ED TRIAGE NOTES
Pt c/o palpitations and chest pressure starting last night, pt has had a few episodes of A fib in the past, ABCD intact.       Triage Assessment (Adult)       Row Name 12/27/24 [id number]          Triage Assessment    Airway WDL WDL        Respiratory WDL    Respiratory WDL WDL        Skin Circulation/Temperature WDL    Skin Circulation/Temperature WDL WDL        Cardiac WDL    Cardiac WDL X;chest pain        Chest Pain Assessment    Character pressure        Peripheral/Neurovascular WDL    Peripheral Neurovascular WDL WDL        Cognitive/Neuro/Behavioral WDL    Cognitive/Neuro/Behavioral WDL WDL

## 2024-12-28 NOTE — ED PROVIDER NOTES
Emergency Department Note      History of Present Illness     Chief Complaint   Palpitations      HPI   Sonny Rush is a 69 year old female with a history of hypertension and adenocarcinoma of left lung s/p lobectomy in remission presenting to the ED with palpitations. She states that over the past few weeks she has suffered intermittent bouts of tachycardia. She states that during these episodes her blood pressures drop. Today she entered an episode in which she was running in the 120's while resting. She endorses dizziness since the start of this episode. She states that while in the midst of high rate palpitations she feels a moderate chest pressure. She states her heart rate hovers in the 90's at baseline. Denies chest pain, leg swelling, fever, black/bloody stool. She states that she is short of breath though believes it possible this is attributable to her asthma. She denies history of blood clot. She states her lobectomy was in 2022. She is uncertain of past atrial fibrillation history.  She states she still feels the symptoms between the episodes but to lesser extent.  Happen at least once daily where sx worsen more often recently.  Does not appear to be excertionally triggered and no syncopal spells.    Independent Historian   None    Review of External Notes   Reviewed Dr. Princess STEVEN [facility] pul note from 12/6/24.  Note history of adenocarcinoma status post left lung lobectomy on air duo and montelukast for asthma.  Doing well at that time though continuing to report some shortness of breath.    Past Medical History     Medical History and Problem List   Anxiety  CKD, stage 3  DCIS  Hypertension  Hypothyroidism  Malignant neoplasm of left lung  Meniere's disease  Migraines  Mild depression  Osteopenia  Palpitations  Postmenopausal bleeding  Pulmonary nodules    Medications   Tylenol  Albuterol  Wellbutrin    Buspar  Hygroton  Catapres  Advair  Neurontin  Vistaril  Synthroid  Cozaar  Remeron  Singulair  Zofran  Roxicodone  Maxalt  Zanaflex  Valtrex  Verelan  Reclast    Surgical History   Past Surgical History:   Procedure Laterality Date    BIOPSY BREAST SEED LOCALIZATION  7/3/2012    Procedure: BIOPSY BREAST SEED LOCALIZATION;  RIGHT BREAST LUMPECTOMY WITH ULTRASOUND SEED LOCALIZATION;  Surgeon: Jaclyn Dalal MD;  Location:  [facility]    BREAST BIOPSY, RT/LT  11/29/00    stereotactic bx right breast--fibrocystic change    COLONOSCOPY N/A 1/30/2017    Procedure: COMBINED COLONOSCOPY, SINGLE OR MULTIPLE BIOPSY/POLYPECTOMY BY BIOPSY;  Surgeon: Muriel Wolff MD;  Location:  GI    DAVINCI LOBECTOMY LUNG Left 3/24/2023    Procedure: LOBECTOMY, LUNG, ROBOT-ASSISTED- wedge resection, COMPLETION LOWER LOBECTOMY, MEDIASTINAL LYMPH NODE DISSECTION;  Surgeon: Tran Hays MD;  Location: [facility] OR    DILATION AND CURETTAGE  2/27/06    inactive endometrium on path; done for PMB    ENT SURGERY      ear tubes, rhinoplasty    LAPAROSCOPY      Age 27 for pelvic pain    ORTHOPEDIC SURGERY      [facility]       Physical Exam     Patient Vitals for the past 24 hrs:   BP Temp Temp src Pulse Resp SpO2   12/27/24 2050 -- -- -- 81 25 98 %   12/27/24 2030 139/80 -- -- 79 16 96 %   12/27/24 2000 131/73 -- -- 82 17 97 %   12/27/24 1837 139/84 -- -- 87 17 98 %   12/27/24 1818 (!) 147/95 -- -- 110 21 98 %   12/27/24 1506 (!) 148/73 96.8  F (36  C) Temporal 119 16 98 %     Physical Exam  General: Awake, alert, non-toxic.  Head:  Scalp is NC/AT  Eyes:  Conjunctiva normal, PERRL  ENT:  The external nose and ears are normal.     Oropharynx clear, uvula midline.  Neck:  Normal range of motion without rigidity.  CV:  Mildly tachycardic but regular    No pathologic murmur, rubs, or gallops.  Resp:  Breath sounds are clear bilaterally    Non-labored, no retractions or accessory muscle use  Abdomen: Abdomen is soft, no distension, no tenderness,  no masses. No CVA tenderness.  MS:  No lower extremity edema/swelling. Extremities without joint swelling or redness.  Skin:  Warm and dry, No rash or lesions noted.  Neuro:  Alert and oriented.  GCS 15 Moves all extremities normal.  No facial asymmetry. Gait normal.  Psych:  Awake. Alert. Normal affect. Appropriate interactions.      Diagnostics     Lab Results   Labs Ordered and Resulted from Time of ED Arrival to Time of ED Departure   COMPREHENSIVE METABOLIC PANEL - Abnormal       Result Value    Sodium 135      Potassium 4.3      Carbon Dioxide (CO2) 23      Anion Gap 14      Urea Nitrogen 20.5      Creatinine 1.50 (*)     GFR Estimate 37 (*)     Calcium 9.0      Chloride 98      Glucose 84      Alkaline Phosphatase 88      AST 27      ALT 18      Protein Total 7.0      Albumin 4.3      Bilirubin Total 0.3     CBC WITH PLATELETS AND DIFFERENTIAL - Abnormal    WBC Count 6.7      RBC Count 3.93      Hemoglobin 13.1      Hematocrit 37.6      MCV 96      MCH 33.3 (*)     MCHC 34.8      RDW 12.8      Platelet Count 328      % Neutrophils 52      % Lymphocytes 32      % Monocytes 12      % Eosinophils 2      % Basophils 1      % Immature Granulocytes 0      NRBCs per 100 WBC 0      Absolute Neutrophils 3.5      Absolute Lymphocytes 2.1      Absolute Monocytes 0.8      Absolute Eosinophils 0.2      Absolute Basophils 0.1      Absolute Immature Granulocytes 0.0      Absolute NRBCs 0.0     TROPONIN T, HIGH SENSITIVITY - Normal    Troponin T, High Sensitivity 10     TSH WITH FREE T4 REFLEX - Normal    TSH 1.56     MAGNESIUM - Normal    Magnesium 2.2     TROPONIN T, HIGH SENSITIVITY - Normal    Troponin T, High Sensitivity 8         Imaging   CT Chest Pulmonary Embolism w Contrast   Final Result   IMPRESSION:   1.  No acute pulmonary embolism.   2.  Mild mosaic attenuation of pulmonary parenchyma, which can be seen with small airways disease.   3.  Few sub-4 mm pulmonary nodules, similar to recent prior.   4.  Status post  left lower lobectomy.         Holter Monitor 48 hour Adult Pediatric    (Results Pending)       EKG   ECG taken at 1505, ECG read at 1513 by Dr. Fuentes  Sinus tachycardia  Low voltage QRS  Borderline ECG   No significant change as compared to prior, dated 3/30/23.  Rate 116 bpm. WI interval 146 ms. QRS duration 60 ms. QT/QTc 300/417 ms. P-R-T axes 82 12 31.    Independent Interpretation   None    ED Course      Medications Administered   Medications   Saline Flush - CT (86 mLs Intravenous $Given 12/27/24 1900)   iopamidol (ISOVUE-370) solution 60 mL (60 mLs Intravenous $Given 12/27/24 1859)   sodium chloride 0.9% BOLUS 1,000 mL (0 mLs Intravenous Stopped 12/27/24 2109)       Procedures   Procedures     Discussion of Management   None    ED Course   ED Course as of 12/28/24 1112   Fri Dec 27, 2024   1820 I obtained history and examined the patient as noted above.       Additional Documentation  None    Medical Decision Making / Diagnosis     CMS Diagnoses: None    MIPS: CT for PE was ordered because the patient is high risk for pulmonary embolism.       ZAMZAM Rush is a 69 year old female with history of asthma, adenocarcinoma of the lung status post lobectomy who presents with some ongoing palpitations and dyspnea.  Broad differential considered.  Workup here reassuring.  Initially sinus tachycardia though resolved without any intervention during visit.  EKG with sinus tach without other concerning findings of ischemia or dysrhythmia, accessory pathway, Brugada, prolonged QT, WPW etc.  High-sensitivity troponin normal x 2 I think ACS or myocarditis very unlikely.  Moderate to high risk for PE by [place] CT chest obtained reassuring no PE no clinical or imaging findings of CHF no pericardial effusion etc. to suggest tamponade pericarditis no evidence of malignancy recurrence and no findings of pneumonia chest infection no other signs of sepsis on history or exam afebrile with normal white count.  Hemoglobin and  electrolytes normal.  TSH normal.  No signs of drug intoxication or alcohol withdrawal.  Hx asthma lungs CTAB.  Creatinine mildly increased above baseline consistent with slight RADHA on CKD.  Possible she is a little bit dry and that would also explain the tachycardia.  I did give her a liter of fluid here and she remained without tachycardia with ambulation in the ED.  In sinus tachycardia here, however seems to describe episodes where heart rate faster/more intense possibility that she is having a paroxysmal supraventricular tachycardia such as SVT or A-fib/flutter.  Reasonable to order Holter monitor and cardiology follow-up which were placed.  Low suspicion for malignant/ventricular dysrhythmia.  I think she is safe for discharge outpatient management.  She will also follow-up with her PCP to discuss symptoms and to get her creat rechecked.  She will return to the ED if persistent tachycardia that is not coming down with rest, worsening pain or shortness of breath, fever, leg swelling weakness or other concerns.    Disposition   The patient was discharged.     Diagnosis     ICD-10-CM    1. Palpitations  R00.2 Follow-Up with Cardiology     Holter Monitor 48 hour Adult Pediatric      2. Elevated serum creatinine  R79.89     mildly increased from baseline.      3. Dyspnea  R06.00 Follow-Up with Cardiology     Holter Monitor 48 hour Adult Pediatric      4. Sinus tachycardia  R00.0 Follow-Up with Cardiology     Holter Monitor 48 hour Adult Pediatric    Episodic           Discharge Medications   Discharge Medication List as of 12/27/2024  9:33 PM        Scribe Disclosure:  I, ANJELICA ZARATE, am serving as a scribe at 6:25 PM on 12/27/2024 to document services personally performed by Cuauhtemoc Mensah PA-C based on my observations and the provider's statements to me.      Cuauhtemoc Mensah PA-C  12/28/24 1118       Cuauhtemoc Mensah PA-C  12/28/24 1118

## 2024-12-29 ENCOUNTER — HEALTH MAINTENANCE LETTER (OUTPATIENT)
Age: 69
End: 2024-12-29

## 2025-02-01 ENCOUNTER — HEALTH MAINTENANCE LETTER (OUTPATIENT)
Age: 70
End: 2025-02-01

## 2025-02-21 ENCOUNTER — APPOINTMENT (OUTPATIENT)
Dept: URBAN - METROPOLITAN AREA CLINIC 256 | Age: 70
Setting detail: DERMATOLOGY
End: 2025-02-21

## 2025-02-21 DIAGNOSIS — Z41.9 ENCOUNTER FOR PROCEDURE FOR PURPOSES OTHER THAN REMEDYING HEALTH STATE, UNSPECIFIED: ICD-10-CM

## 2025-02-21 PROCEDURE — OTHER VBEAM PERFECTA LASER: OTHER

## 2025-02-21 ASSESSMENT — LOCATION SIMPLE DESCRIPTION DERM
LOCATION SIMPLE: RIGHT LIP
LOCATION SIMPLE: CHIN
LOCATION SIMPLE: LEFT LIP
LOCATION SIMPLE: LEFT CHEEK
LOCATION SIMPLE: RIGHT NOSE
LOCATION SIMPLE: LEFT NOSE

## 2025-02-21 ASSESSMENT — LOCATION ZONE DERM
LOCATION ZONE: FACE
LOCATION ZONE: NOSE
LOCATION ZONE: LIP

## 2025-02-21 ASSESSMENT — LOCATION DETAILED DESCRIPTION DERM
LOCATION DETAILED: RIGHT UPPER CUTANEOUS LIP
LOCATION DETAILED: RIGHT NASAL ALA
LOCATION DETAILED: LEFT NASAL ALA
LOCATION DETAILED: LEFT CHIN
LOCATION DETAILED: LEFT INFERIOR LATERAL BUCCAL CHEEK
LOCATION DETAILED: LEFT UPPER CUTANEOUS LIP

## 2025-02-21 NOTE — PROCEDURE: VBEAM PERFECTA LASER
Skin Type (Optional): III
Is There A Third Setting?: no
Consent: Written consent obtained.  Risks were reviewed including but not limited to crusting, scabbing, blistering, scarring, darker or lighter pigmentary change, bruising, and/or incomplete response.
Location: nose, chin
Delay Time (Ms): 0
Spot Size: 10 mm
Detail Level: Zone
Post-Care Instructions: I reviewed with the patient in detail post-care instructions.  Cool compresses or cold gel packs may be applied after treatment.  Exposure to the sun should be avoided and sun protection and sunscreen should be used.
Pulse Duration: 3 ms
Spot Size: 3 mm
Pulse Duration: 10 ms
Pre-Procedure: The treatment areas identified by the patient were cleansed and treatment was performed with the parameters mentioned above.
Fluence (J/Cm2): 10.00
Spot Size: 5 mm
Post-Procedure: Following the procedure the post care instructions were reviewed with the patient.
Treatment Number: 1
Pulse Duration: 1.5 ms
Number Of Pulses: 3
Location: left jaw
Fluence (J/Cm2): 7.50

## 2025-02-26 ENCOUNTER — OFFICE VISIT (OUTPATIENT)
Dept: CARDIOLOGY | Facility: CLINIC | Age: 70
End: 2025-02-26
Payer: COMMERCIAL

## 2025-02-26 VITALS
OXYGEN SATURATION: 99 % | DIASTOLIC BLOOD PRESSURE: 79 MMHG | BODY MASS INDEX: 24.69 KG/M2 | HEART RATE: 74 BPM | SYSTOLIC BLOOD PRESSURE: 144 MMHG | HEIGHT: 66 IN

## 2025-02-26 DIAGNOSIS — R07.9 CHEST PAIN, UNSPECIFIED TYPE: Primary | ICD-10-CM

## 2025-02-26 DIAGNOSIS — R00.2 PALPITATIONS: ICD-10-CM

## 2025-02-26 RX ORDER — LORAZEPAM 0.5 MG/1
0.5 TABLET ORAL EVERY 6 HOURS PRN
COMMUNITY

## 2025-02-26 RX ORDER — METOPROLOL SUCCINATE 25 MG/1
12.5 TABLET, EXTENDED RELEASE ORAL DAILY
Qty: 60 TABLET | Refills: 3 | Status: SHIPPED | OUTPATIENT
Start: 2025-02-26

## 2025-02-26 RX ORDER — CEFDINIR 300 MG/1
300 CAPSULE ORAL 2 TIMES DAILY
COMMUNITY

## 2025-02-26 NOTE — LETTER
2/26/2025    Olga Wang, PA-C  [address]  [facility] [address]    RE: Sonny Rush       Dear Colleague,     I had the pleasure of seeing Sonny Rush in the [facility].  CARDIOLOGY CLINIC CONSULTATION    PRIMARY CARE PHYSICIAN:  Olga Wang    HISTORY OF PRESENT ILLNESS:  The patient is a 69-year-old female with history of hypertension, chronic migraine headaches, depression and palpitations thought to be due to benign atrial arrhythmias who is here for follow-up.    She reports worsening palpitations over the last several months.  She went to the emergency department recently for prolonged episode of what she describes as tachycardia.  EKG in the ER showed sinus tachycardia with heart rate 160 bpm.  Subsequent Holter monitor demonstrated frequent atrial ectopic beats and 1 run of SVT.  No significant PVCs or ventricular arrhythmias were noted.    In addition to the palpitations, she reports left-sided chest discomfort that sometimes noted with exertion.  No presyncope or syncope    PAST MEDICAL HISTORY:  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      HTN (hypertension)      Hypothyroidism      Malignant neoplasm of left lung, unspecified part of lung (H)      Meniere's disease      Migraines     ON NEURONTIN, sees neurologist     Mild depression     sees psych     Osteopenia      Palpitations      PMB (postmenopausal bleeding) 01/01/2006    Had  D & C 2/06 showing inactive endometrium     Pulmonary nodules        MEDICATIONS:  Current Outpatient Medications   Medication Sig Dispense Refill     albuterol (PROAIR HFA/PROVENTIL HFA/VENTOLIN HFA) 108 (90 Base) MCG/ACT inhaler Inhale 2 puffs into the lungs every 6 hours as needed for shortness of breath, wheezing or cough. 18 g 3     buPROPion (WELLBUTRIN XL) 150 MG 24 hr tablet Take 150 mg by mouth  every morning       cefdinir (OMNICEF) 300 MG capsule Take 300 mg by mouth 2 times daily.       cloNIDine (CATAPRES-TTS1) 0.1 MG/24HR WK patch Place 1 patch onto the skin once a week Changes on Sat morning       fluticasone-salmeterol (ADVAIR) 500-50 MCG/ACT inhaler Inhale 1 puff into the lungs every 12 hours. 180 each 3     HydrOXYzine Pamoate (VISTARIL PO) Take 25 mg by mouth as needed.       levothyroxine (SYNTHROID/LEVOTHROID) 88 MCG tablet Take 88 mcg by mouth daily       LORazepam (ATIVAN) 0.5 MG tablet Take 0.5 mg by mouth every 6 hours as needed for anxiety.       losartan (COZAAR) 100 MG tablet Take 100 mg by mouth daily       metoprolol succinate ER (TOPROL XL) 25 MG 24 hr tablet Take 0.5 tablets (12.5 mg) by mouth daily. 60 tablet 3     montelukast (SINGULAIR) 10 MG tablet        ondansetron (ZOFRAN ODT) 8 MG ODT tab Take 8 mg by mouth every 8 hours as needed for nausea       rizatriptan (MAXALT-MLT) 10 MG ODT Take 1 tablet by mouth as needed       Suvorexant (BELSOMRA) 20 MG tablet Take 10 mg by mouth nightly as needed for sleep.       valACYclovir (VALTREX) 1000 mg tablet Take 1,000 mg by mouth 2 times daily as needed (cold sores)       Zoledronic Acid (RECLAST IV) Inject 5 mg into the vein once yearly       No current facility-administered medications for this visit.       SOCIAL HISTORY:  I have reviewed this patient's social history and updated it with pertinent information if needed. Sonny Rush  reports that she quit smoking about 28 years ago. Her smoking use included cigarettes. She has never used smokeless tobacco. She reports current alcohol use. She reports that she does not use drugs.    PHYSICAL EXAM:  Pulse:  [74] 74  BP: (144)/(79) 144/79  SpO2:  [99 %] 99 %  0 lbs 0 oz    Constitutional: alert, no distress  Respiratory: Good bilateral air entry  Cardiovascular: Regular rhythm, no murmurs  GI: nondistended  Neuropsychiatric: appropriate affact    ASSESSMENT: Pertinent issues addressed/  reviewed during this cardiology visit    Atypical chest pain  Palpitations, likely related to atrial ectopic arrhythmia  Hypertension  Anxiety    RECOMMENDATIONS:  Stress test to rule out significant underlying ischemia  Low-dose metoprolol to suppress frequent PACs  Continue losartan    It was a pleasure seeing this patient in clinic today. Please do not hesitate to contact me with any future questions.     Ruben Daly MD, [facility]  Cardiology - [facility]  February 26, 2025    Review of the result(s) of each unique test - Last ECG, lipid profile, CBC, BMP     The level of medical decision making during this visit was of moderate complexity.    This note was completed in part using dictation via the Dragon voice recognition software. Some word and grammatical errors may occur and must be interpreted in the appropriate clinical context.  If there are any questions pertaining to this issue, please contact me for further clarification.      Thank you for allowing me to participate in the care of your patient.      Sincerely,     Ruben Daly MD, MD     [facility]  cc:   No referring provider defined for this encounter.

## 2025-02-26 NOTE — PROGRESS NOTES
CARDIOLOGY CLINIC CONSULTATION    PRIMARY CARE PHYSICIAN:  Olga Wang    HISTORY OF PRESENT ILLNESS:  The patient is a 69-year-old female with history of hypertension, chronic migraine headaches, depression and palpitations thought to be due to benign atrial arrhythmias who is here for follow-up.    She reports worsening palpitations over the last several months.  She went to the emergency department recently for prolonged episode of what she describes as tachycardia.  EKG in the ER showed sinus tachycardia with heart rate 160 bpm.  Subsequent Holter monitor demonstrated frequent atrial ectopic beats and 1 run of SVT.  No significant PVCs or ventricular arrhythmias were noted.    In addition to the palpitations, she reports left-sided chest discomfort that sometimes noted with exertion.  No presyncope or syncope    PAST MEDICAL HISTORY:  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     HTN (hypertension)     Hypothyroidism     Malignant neoplasm of left lung, unspecified part of lung (H)     Meniere's disease     Migraines     ON NEURONTIN, sees neurologist    Mild depression     sees psych    Osteopenia     Palpitations     PMB (postmenopausal bleeding) 01/01/2006    Had  D & C 2/06 showing inactive endometrium    Pulmonary nodules        MEDICATIONS:  Current Outpatient Medications   Medication Sig Dispense Refill    albuterol (PROAIR HFA/PROVENTIL HFA/VENTOLIN HFA) 108 (90 Base) MCG/ACT inhaler Inhale 2 puffs into the lungs every 6 hours as needed for shortness of breath, wheezing or cough. 18 g 3    buPROPion (WELLBUTRIN XL) 150 MG 24 hr tablet Take 150 mg by mouth every morning      cefdinir (OMNICEF) 300 MG capsule Take 300 mg by mouth 2 times daily.      cloNIDine (CATAPRES-TTS1) 0.1 MG/24HR WK patch Place 1 patch onto the skin once a week Changes on Sat morning       fluticasone-salmeterol (ADVAIR) 500-50 MCG/ACT inhaler Inhale 1 puff into the lungs every 12 hours. 180 each 3    HydrOXYzine Pamoate (VISTARIL PO) Take 25 mg by mouth as needed.      levothyroxine (SYNTHROID/LEVOTHROID) 88 MCG tablet Take 88 mcg by mouth daily      LORazepam (ATIVAN) 0.5 MG tablet Take 0.5 mg by mouth every 6 hours as needed for anxiety.      losartan (COZAAR) 100 MG tablet Take 100 mg by mouth daily      metoprolol succinate ER (TOPROL XL) 25 MG 24 hr tablet Take 0.5 tablets (12.5 mg) by mouth daily. 60 tablet 3    montelukast (SINGULAIR) 10 MG tablet       ondansetron (ZOFRAN ODT) 8 MG ODT tab Take 8 mg by mouth every 8 hours as needed for nausea      rizatriptan (MAXALT-MLT) 10 MG ODT Take 1 tablet by mouth as needed      Suvorexant (BELSOMRA) 20 MG tablet Take 10 mg by mouth nightly as needed for sleep.      valACYclovir (VALTREX) 1000 mg tablet Take 1,000 mg by mouth 2 times daily as needed (cold sores)      Zoledronic Acid (RECLAST IV) Inject 5 mg into the vein once yearly       No current facility-administered medications for this visit.       SOCIAL HISTORY:  I have reviewed this patient's social history and updated it with pertinent information if needed. Sonny Rush  reports that she quit smoking about 28 years ago. Her smoking use included cigarettes. She has never used smokeless tobacco. She reports current alcohol use. She reports that she does not use drugs.    PHYSICAL EXAM:  Pulse:  [74] 74  BP: (144)/(79) 144/79  SpO2:  [99 %] 99 %  0 lbs 0 oz    Constitutional: alert, no distress  Respiratory: Good bilateral air entry  Cardiovascular: Regular rhythm, no murmurs  GI: nondistended  Neuropsychiatric: appropriate affact    ASSESSMENT: Pertinent issues addressed/ reviewed during this cardiology visit    Atypical chest pain  Palpitations, likely related to atrial ectopic arrhythmia  Hypertension  Anxiety    RECOMMENDATIONS:  Stress test to rule out significant underlying  ischemia  Low-dose metoprolol to suppress frequent PACs  Continue losartan    It was a pleasure seeing this patient in clinic today. Please do not hesitate to contact me with any future questions.     Ruben Daly MD, [facility]  Cardiology - [facility]  February 26, 2025    Review of the result(s) of each unique test - Last ECG, lipid profile, CBC, BMP     The level of medical decision making during this visit was of moderate complexity.    This note was completed in part using dictation via the Dragon voice recognition software. Some word and grammatical errors may occur and must be interpreted in the appropriate clinical context.  If there are any questions pertaining to this issue, please contact me for further clarification.

## 2025-03-12 ENCOUNTER — HOSPITAL ENCOUNTER (OUTPATIENT)
Dept: CARDIOLOGY | Facility: CLINIC | Age: 70
Discharge: HOME | End: 2025-03-12
Attending: INTERNAL MEDICINE
Payer: COMMERCIAL

## 2025-03-12 DIAGNOSIS — R07.9 CHEST PAIN, UNSPECIFIED TYPE: ICD-10-CM

## 2025-03-12 PROCEDURE — 255N000002 HC RX 255 OP 636: Performed by: INTERNAL MEDICINE

## 2025-03-12 PROCEDURE — 999N000208 ECHO STRESS ECHOCARDIOGRAM

## 2025-03-12 RX ADMIN — PERFLUTREN 10 ML: 6.52 INJECTION, SUSPENSION INTRAVENOUS at 12:10

## 2025-03-21 ENCOUNTER — APPOINTMENT (OUTPATIENT)
Dept: URBAN - METROPOLITAN AREA CLINIC 256 | Age: 70
Setting detail: DERMATOLOGY
End: 2025-03-21

## 2025-03-21 DIAGNOSIS — Z41.9 ENCOUNTER FOR PROCEDURE FOR PURPOSES OTHER THAN REMEDYING HEALTH STATE, UNSPECIFIED: ICD-10-CM

## 2025-03-21 PROCEDURE — OTHER VBEAM PERFECTA LASER: OTHER

## 2025-03-21 ASSESSMENT — LOCATION SIMPLE DESCRIPTION DERM
LOCATION SIMPLE: CHIN
LOCATION SIMPLE: LEFT CHEEK
LOCATION SIMPLE: RIGHT NOSE
LOCATION SIMPLE: LEFT LIP
LOCATION SIMPLE: RIGHT LIP

## 2025-03-21 ASSESSMENT — LOCATION ZONE DERM
LOCATION ZONE: LIP
LOCATION ZONE: NOSE
LOCATION ZONE: FACE

## 2025-03-21 ASSESSMENT — LOCATION DETAILED DESCRIPTION DERM
LOCATION DETAILED: RIGHT CHIN
LOCATION DETAILED: LEFT MEDIAL MANDIBULAR CHEEK
LOCATION DETAILED: LEFT UPPER CUTANEOUS LIP
LOCATION DETAILED: LEFT INFERIOR NASAL CHEEK
LOCATION DETAILED: LEFT LOWER CUTANEOUS LIP
LOCATION DETAILED: RIGHT NASAL ALA
LOCATION DETAILED: RIGHT UPPER CUTANEOUS LIP

## 2025-03-21 NOTE — PROCEDURE: VBEAM PERFECTA LASER
Spot Size: 3 mm
Delay Time (Ms): 0
Detail Level: Zone
Is There A Fourth Setting?: no
Pulse Duration: 6 ms
Post-Procedure: Following the procedure the post care instructions were reviewed with the patient.
Pulse Duration: 1.5 ms
Is There A Second Setting?: yes
Consent: Written consent obtained.  Risks were reviewed including but not limited to crusting, scabbing, blistering, scarring, darker or lighter pigmentary change, bruising, and/or incomplete response.
Skin Type (Optional): III
Number Of Pulses: 2
Spot Size: 7 mm
Fluence (J/Cm2): 10.00
Post-Care Instructions: I reviewed with the patient in detail post-care instructions.  Cool compresses or cold gel packs may be applied after treatment.  Exposure to the sun should be avoided and sun protection and sunscreen should be used.
Location: nose, chin, left jawline
Fluence (J/Cm2): 7.75
Pre-Procedure: The treatment areas identified by the patient were cleansed and treatment was performed with the parameters mentioned above.

## 2025-04-01 NOTE — PROGRESS NOTES
[facility]   Clinic Visit s Mar 4, 2019    PCP: Patricio York    Subjective:  Sonny Rush is a 63 year old female who is seen today for follow up of Closed displaced avulsion fracture of right talus, initial encounter. Injury occurred on February / 08 / 2019, (3  week(s) ago); her last visit was 2/13/2019.  She has been in a full leg walking boot and also a compression sleeve. She reports wearing the boot on and off since last visit.  Sonny Rush is alone today     Patient has swelling that worsens with physical activity; denies paresthesias, or weakness. Patient did express concern about healing thus far.   Patient's past medical, surgical, social and family histories are reviewed today in the medical record.    History from previous visit on 2/13/2019  Injury: Patient describes injury as tripped over her new kitten and fell down a few stairs.        Location of Pain: right ankle and foot medial, nonradiating   Duration of Pain: 5 day(s) 2/8/19  Rating of Pain at worst: 5/10  Rating of Pain Currently: 5/10  Pain is better with: activity avoidance   Pain is worse with: walking and wearing her foot  Treatment so far consists of: boot and rest, aleve  Associated symptoms: swelling Moderate  Recent imaging completed: X-rays completed 2/10/19.  Prior History of related problems: hammer toe surgery right foot    Social History: retired     Past Medical History:   Diagnosis Date     Anxiety      Malignant neoplasm (H)      Meniere's disease      Migraines     ON NEURONTIN, sees neurologist     Mild depression (H)     sees psych     PMB (postmenopausal bleeding) 2006    Had  D & C 2/06 showing inactive endometrium         Family History   Problem Relation Age of Onset     Breast Cancer Unknown         aunt     Hyperlipidemia Father      Hypertension Father      Ovarian Cancer Unknown      Uterine Cancer Unknown        Social History     Socioeconomic History      "Marital status:      Spouse name: Virgilio                                              Occupational History     Occupation:      Employer: [employer]   Tobacco Use     Smoking status: Former Smoker     Last attempt to quit: 7/3/2003     Years since quitting: 15.6     Smokeless tobacco: Never Used   Substance and Sexual Activity     Alcohol use: Yes     Alcohol/week: 0.0 oz     Drug use: No       Past Surgical History:   Procedure Laterality Date     BIOPSY BREAST SEED LOCALIZATION  7/3/2012    Procedure: BIOPSY BREAST SEED LOCALIZATION;  RIGHT BREAST LUMPECTOMY WITH ULTRASOUND SEED LOCALIZATION;  Surgeon: Jaclyn Dalal MD;  Location:  [facility]     BREAST BIOPSY, RT/LT  11/29/00    stereotactic bx right breast--fibrocystic change     COLONOSCOPY N/A 1/30/2017    Procedure: COMBINED COLONOSCOPY, SINGLE OR MULTIPLE BIOPSY/POLYPECTOMY BY BIOPSY;  Surgeon: Muriel Wolff MD;  Location:  GI     DILATION AND CURETTAGE  2/27/06    inactive endometrium on path; done for PMB     ENT SURGERY      ear tubes, rhinoplasty     LAPAROSCOPY      Age 27 for pelvic pain     [facility]       Review of Systems   Musculoskeletal: Positive for joint pain.   All other systems reviewed and are negative.        Physical Exam  BP (!) 152/97   Ht 1.695 m (5' 6.75\")   Wt 71.7 kg (158 lb)   BMI 24.93 kg/m    Constitutional:well-developed, well-nourished, and in no distress.   Cardiovascular: Intact distal pulses.    Neurological: alert. Gait Abnormal:   Antalgic gait  Skin: Skin is warm and dry.   Psychiatric: Mood and affect normal.   Respiratory: unlabored, speaks in full sentences  Lymph: no LAD, no lymphangitis            Right Ankle Exam     Tenderness   Right ankle tenderness location: Proximal dorsal foot just distal to tibiotalar junction.  Swelling: mild    Range of Motion   Dorsiflexion: 10   Plantar flexion: 20   Eversion: 5   Inversion: 10     Muscle Strength   Dorsiflexion:  " 5/5  Plantar flexion:  5/5    Tests   Anterior drawer: positive  Varus tilt: positive    Other   Erythema: absent  Sensation: normal  Pulse: present     Comments:  Improved but persistent swelling.               X-ray images repeated today and independently reviewed by me in the office today with the patient. X-ray shows:     Recent Results (from the past 744 hour(s))   XR Ankle Right G/E 3 Views    Narrative    FOOT RIGHT THREE OR MORE VIEWS, ANKLE RIGHT THREE OR MORE VIEWS  2/10/2019 7:13 PM     HISTORY: Foot and ankle pain after an injury.    COMPARISON: None.      Impression    IMPRESSION:     Right ankle: There is a small sliver-like bony fragment projecting  dorsal to the talar neck consistent with a small avulsion fracture. If  the patient has tenderness in this region the fracture is likely  acute. No other acute appearing bony abnormality. Soft tissue swelling  about the ankle.    Right foot: No acute bony abnormality or significant degenerative  changes. Diffuse dorsal midfoot and forefoot soft tissue swelling.    SHA DORSEY MD   XR Foot Right G/E 3 Views    Narrative    FOOT RIGHT THREE OR MORE VIEWS, ANKLE RIGHT THREE OR MORE VIEWS  2/10/2019 7:13 PM     HISTORY: Foot and ankle pain after an injury.    COMPARISON: None.      Impression    IMPRESSION:     Right ankle: There is a small sliver-like bony fragment projecting  dorsal to the talar neck consistent with a small avulsion fracture. If  the patient has tenderness in this region the fracture is likely  acute. No other acute appearing bony abnormality. Soft tissue swelling  about the ankle.    Right foot: No acute bony abnormality or significant degenerative  changes. Diffuse dorsal midfoot and forefoot soft tissue swelling.    SHA DORSEY MD   XR Ankle Right G/E 3 Views    Narrative    3/4/2019    Unchanged talar neck avulsion fracture with persistent though mildly   improved soft tissue swelling also present.  No new fractures.  Mortise is  n/a   intact.     ASSESSMENT/PLAN    ICD-10-CM    1. Closed displaced avulsion fracture of right talus, initial encounter S92.151A XR Ankle Right G/E 3 Views     Persistent pain, mildly improved, slightly improved swelling, encouraged her to use the boot more regularly for pain control.  X-rays noted as essentially unchanged though with less swelling, and patient is cautioned that this can be permanent in its appearance but evulsion can still heal quite well.  Reinforced use of boot to keep foot at 90 degrees of flexion at the ankle.  Encouraged cold packs 10-15 minutes 3 times daily.  Recheck in 2-3 weeks.  X-ray does not need to be repeated unless symptoms worsen  Paddy to follow up with Primary Care provider regarding elevated blood pressure.

## 2025-04-07 DIAGNOSIS — R06.02 SHORTNESS OF BREATH: ICD-10-CM

## 2025-04-07 RX ORDER — ALBUTEROL SULFATE 90 UG/1
2 INHALANT RESPIRATORY (INHALATION) EVERY 6 HOURS PRN
Qty: 18 G | Refills: 5 | Status: SHIPPED | OUTPATIENT
Start: 2025-04-07

## 2025-04-07 NOTE — TELEPHONE ENCOUNTER
Requested Prescriptions   Pending Prescriptions Disp Refills    albuterol (PROAIR HFA/PROVENTIL HFA/VENTOLIN HFA) 108 (90 Base) MCG/ACT inhaler 18 g 3     Sig: Inhale 2 puffs into the lungs every 6 hours as needed for shortness of breath, wheezing or cough.       Short-Acting Beta Agonist Inhalers Protocol  Passed - 4/7/2025 10:52 AM        Passed - Patient is age 12 or older        Passed - Medication is active on med list and the sig matches. RN to manually verify dose and sig if red X/fail.     If the protocol passes (green check), you do not need to verify med dose and sig.    A prescription matches if they are the same clinical intention.    For Example: once daily and every morning are the same.    The protocol can not identify upper and lower case letters as matching and will fail.     For Example: Take 1 tablet (50 mg) by mouth daily     TAKE 1 TABLET (50 MG) BY MOUTH DAILY    For all fails (red x), verify dose and sig.    If the refill does match what is on file, the RN can still proceed to approve the refill request.       If they do not match, route to the appropriate provider.             Passed - Recent (12 mo) or future (90 days) visit within the authorizing provider's specialty     The patient must have completed an in-person or virtual visit within the past 12 months or has a future visit scheduled within the next 90 days with the authorizing provider s specialty.  Urgent care and e-visits do not qualify as an office visit for this protocol.             [facility]ill sent to [facility].    Freddy Juares RN

## 2025-04-07 NOTE — TELEPHONE ENCOUNTER
Last Written Prescription Date:  11/14/24  Last Fill Quantity: 18g,  # refills: 3   Last office visit: 12/6/2024 ; last virtual visit: Visit date not found with prescribing provider:  dr quintero   Future Office Visit:           Requested Prescriptions   Pending Prescriptions Disp Refills    albuterol (PROAIR HFA/PROVENTIL HFA/VENTOLIN HFA) 108 (90 Base) MCG/ACT inhaler 18 g 3     Sig: Inhale 2 puffs into the lungs every 6 hours as needed for shortness of breath, wheezing or cough.       There is no refill protocol information for this order

## 2025-04-13 ENCOUNTER — HEALTH MAINTENANCE LETTER (OUTPATIENT)
Age: 70
End: 2025-04-13

## 2025-05-06 ENCOUNTER — ANCILLARY PROCEDURE (OUTPATIENT)
Dept: CT IMAGING | Facility: CLINIC | Age: 70
End: 2025-05-06
Attending: INTERNAL MEDICINE
Payer: COMMERCIAL

## 2025-05-06 DIAGNOSIS — C34.32 PRIMARY MALIGNANT NEOPLASM OF BRONCHUS OF LEFT LOWER LOBE (H): ICD-10-CM

## 2025-05-06 LAB
CREAT BLD-MCNC: 1.4 MG/DL (ref 0.5–1)
EGFRCR SERPLBLD CKD-EPI 2021: 40 ML/MIN/1.73M2

## 2025-05-06 PROCEDURE — 71260 CT THORAX DX C+: CPT

## 2025-05-06 PROCEDURE — 250N000011 HC RX IP 250 OP 636: Performed by: INTERNAL MEDICINE

## 2025-05-06 PROCEDURE — 82565 ASSAY OF CREATININE: CPT

## 2025-05-06 PROCEDURE — 250N000009 HC RX 250: Performed by: INTERNAL MEDICINE

## 2025-05-06 RX ORDER — IOPAMIDOL 755 MG/ML
81 INJECTION, SOLUTION INTRAVASCULAR ONCE
Status: COMPLETED | OUTPATIENT
Start: 2025-05-06 | End: 2025-05-06

## 2025-05-06 RX ADMIN — IOPAMIDOL 81 ML: 755 INJECTION, SOLUTION INTRAVENOUS at 12:01

## 2025-05-06 RX ADMIN — SODIUM CHLORIDE 50 ML: 9 INJECTION, SOLUTION INTRAVENOUS at 12:01

## 2025-06-23 ENCOUNTER — TELEPHONE (OUTPATIENT)
Dept: PULMONOLOGY | Facility: CLINIC | Age: 70
End: 2025-06-23
Payer: COMMERCIAL

## 2025-06-23 ENCOUNTER — TRANSCRIBE ORDERS (OUTPATIENT)
Dept: URGENT CARE | Facility: URGENT CARE | Age: 70
End: 2025-06-23
Payer: COMMERCIAL

## 2025-06-23 DIAGNOSIS — M81.0 OSTEOPOROSIS: Primary | ICD-10-CM

## 2025-06-23 RX ORDER — METHYLPREDNISOLONE SODIUM SUCCINATE 40 MG/ML
40 INJECTION INTRAMUSCULAR; INTRAVENOUS
Start: 2025-06-30

## 2025-06-23 RX ORDER — HEPARIN SODIUM,PORCINE 10 UNIT/ML
5-20 VIAL (ML) INTRAVENOUS DAILY PRN
OUTPATIENT
Start: 2025-06-30

## 2025-06-23 RX ORDER — ZOLEDRONIC ACID 0.05 MG/ML
5 INJECTION, SOLUTION INTRAVENOUS ONCE
Start: 2025-06-30

## 2025-06-23 RX ORDER — DIPHENHYDRAMINE HYDROCHLORIDE 50 MG/ML
25 INJECTION, SOLUTION INTRAMUSCULAR; INTRAVENOUS
Start: 2025-06-30

## 2025-06-23 RX ORDER — EPINEPHRINE 1 MG/ML
0.3 INJECTION, SOLUTION, CONCENTRATE INTRAVENOUS EVERY 5 MIN PRN
OUTPATIENT
Start: 2025-06-30

## 2025-06-23 RX ORDER — ALBUTEROL SULFATE 90 UG/1
1-2 INHALANT RESPIRATORY (INHALATION)
Start: 2025-06-30

## 2025-06-23 RX ORDER — HEPARIN SODIUM (PORCINE) LOCK FLUSH IV SOLN 100 UNIT/ML 100 UNIT/ML
5 SOLUTION INTRAVENOUS
OUTPATIENT
Start: 2025-06-30

## 2025-06-23 RX ORDER — ALBUTEROL SULFATE 0.83 MG/ML
2.5 SOLUTION RESPIRATORY (INHALATION)
OUTPATIENT
Start: 2025-06-30

## 2025-06-23 RX ORDER — DIPHENHYDRAMINE HYDROCHLORIDE 50 MG/ML
50 INJECTION, SOLUTION INTRAMUSCULAR; INTRAVENOUS
Start: 2025-06-30

## 2025-06-23 RX ORDER — MEPERIDINE HYDROCHLORIDE 25 MG/ML
25 INJECTION INTRAMUSCULAR; INTRAVENOUS; SUBCUTANEOUS
OUTPATIENT
Start: 2025-06-30

## 2025-06-23 NOTE — TELEPHONE ENCOUNTER
Spoke with pharmacist at [facility]. Pharmacy was holding medication due to patient having corticosteroid allergy on file. Patient was previously on Advair Diskus and was tolerating inhaler. Dr Sánchez recommended escalating to Breztri. Pharmacy will contact patient regarding delivery of medication.     LVM with update for patient.    Freddy Juares RN

## 2025-06-23 NOTE — TELEPHONE ENCOUNTER
[facility] Call Center    Phone Message    May a detailed message be left on voicemail: yes     Reason for Call: Medication Question or concern regarding medication   Prescription Clarification    Name of Medication:   budeson-glycopyrrol-formoterol (BREZTRI AEROSPHERE) 160-9-4.8 MCG/ACT AERO inhaler     Prescribing Provider: Princess     Pharmacy:   [facility] - [address]        What on the order needs clarification? Patient states the pharmacy is refusing to give patient the medication as they are requiring more information. Patient states the pharmacy has not heard anything back from the clinic. Patient is wanting the clinic to reach out to the pharmacy ASAP to be able to get the medication.       Action Taken: Message routed to:  [facility]    Travel Screening: Not Applicable     Date of Service:

## 2025-07-24 ENCOUNTER — TELEPHONE (OUTPATIENT)
Dept: ONCOLOGY | Facility: CLINIC | Age: 70
End: 2025-07-24
Payer: COMMERCIAL

## 2025-07-29 ENCOUNTER — INFUSION THERAPY VISIT (OUTPATIENT)
Dept: INFUSION THERAPY | Facility: CLINIC | Age: 70
End: 2025-07-29
Attending: NURSE PRACTITIONER
Payer: COMMERCIAL

## 2025-07-29 VITALS
DIASTOLIC BLOOD PRESSURE: 73 MMHG | HEIGHT: 66 IN | BODY MASS INDEX: 25.81 KG/M2 | WEIGHT: 160.6 LBS | HEART RATE: 64 BPM | SYSTOLIC BLOOD PRESSURE: 110 MMHG | OXYGEN SATURATION: 99 % | TEMPERATURE: 97.6 F | RESPIRATION RATE: 16 BRPM

## 2025-07-29 DIAGNOSIS — M81.0 OSTEOPOROSIS: Primary | ICD-10-CM

## 2025-07-29 PROCEDURE — 250N000011 HC RX IP 250 OP 636: Performed by: INTERNAL MEDICINE

## 2025-07-29 PROCEDURE — 258N000003 HC RX IP 258 OP 636: Performed by: INTERNAL MEDICINE

## 2025-07-29 PROCEDURE — 96365 THER/PROPH/DIAG IV INF INIT: CPT

## 2025-07-29 RX ORDER — MEPERIDINE HYDROCHLORIDE 25 MG/ML
25 INJECTION INTRAMUSCULAR; INTRAVENOUS; SUBCUTANEOUS
OUTPATIENT
Start: 2026-07-29

## 2025-07-29 RX ORDER — ZOLEDRONIC ACID 0.05 MG/ML
5 INJECTION, SOLUTION INTRAVENOUS ONCE
Status: CANCELLED
Start: 2026-07-29

## 2025-07-29 RX ORDER — DIPHENHYDRAMINE HYDROCHLORIDE 50 MG/ML
25 INJECTION, SOLUTION INTRAMUSCULAR; INTRAVENOUS
Start: 2026-07-29

## 2025-07-29 RX ORDER — DIPHENHYDRAMINE HYDROCHLORIDE 50 MG/ML
50 INJECTION, SOLUTION INTRAMUSCULAR; INTRAVENOUS
Start: 2026-07-29

## 2025-07-29 RX ORDER — ALBUTEROL SULFATE 90 UG/1
1-2 INHALANT RESPIRATORY (INHALATION)
Start: 2026-07-29

## 2025-07-29 RX ORDER — METHYLPREDNISOLONE SODIUM SUCCINATE 40 MG/ML
40 INJECTION INTRAMUSCULAR; INTRAVENOUS
Start: 2026-07-29

## 2025-07-29 RX ORDER — ALBUTEROL SULFATE 0.83 MG/ML
2.5 SOLUTION RESPIRATORY (INHALATION)
OUTPATIENT
Start: 2026-07-29

## 2025-07-29 RX ORDER — HEPARIN SODIUM (PORCINE) LOCK FLUSH IV SOLN 100 UNIT/ML 100 UNIT/ML
5 SOLUTION INTRAVENOUS
OUTPATIENT
Start: 2026-07-29

## 2025-07-29 RX ORDER — ZOLEDRONIC ACID 0.05 MG/ML
5 INJECTION, SOLUTION INTRAVENOUS ONCE
Status: COMPLETED | OUTPATIENT
Start: 2025-07-29 | End: 2025-07-29

## 2025-07-29 RX ORDER — EPINEPHRINE 1 MG/ML
0.3 INJECTION, SOLUTION INTRAMUSCULAR; SUBCUTANEOUS EVERY 5 MIN PRN
OUTPATIENT
Start: 2026-07-29

## 2025-07-29 RX ORDER — HEPARIN SODIUM,PORCINE 10 UNIT/ML
5-20 VIAL (ML) INTRAVENOUS DAILY PRN
OUTPATIENT
Start: 2026-07-29

## 2025-07-29 RX ADMIN — ZOLEDRONIC ACID 5 MG: 0.05 INJECTION, SOLUTION INTRAVENOUS at 10:52

## 2025-07-29 RX ADMIN — SODIUM CHLORIDE 250 ML: 0.9 INJECTION, SOLUTION INTRAVENOUS at 10:51

## 2025-07-29 NOTE — PROGRESS NOTES
Infusion Nursing Note:  Sonny Rush presents today for Reclast.    Patient seen by provider today: No   present during visit today: Not Applicable.    Note: Pt reports she has had Reclast in the past, and tolerated it well. She has no new health changes or concerns today. Pt states she dose not take Ca+Vit D every day. Educated about the importance to take it BID as per order while on Reclast. Pt expressed understanding and confirmed she will start taking the two supplements today.       Intravenous Access:  Peripheral IV placed.    Treatment Conditions:  Creat 1.06. Ca9.7. Alb 4.3      Post Infusion Assessment:  Patient tolerated infusion without incident.  Blood return noted pre and post infusion.  Site patent and intact, free from redness, edema or discomfort.  No evidence of extravasations.  Access discontinued per protocol.       Discharge Plan:   Discharge instructions reviewed with: Patient.  Patient and/or family verbalized understanding of discharge instructions and all questions answered.  AVS to patient via MYCHART.  Patient will return as advised by her provider for next appointment.   Patient discharged in stable condition accompanied by: self.  Departure Mode: Ambulatory.      Nikkie Gloria RN

## 2025-08-12 ENCOUNTER — ANCILLARY PROCEDURE (OUTPATIENT)
Dept: CT IMAGING | Facility: CLINIC | Age: 70
End: 2025-08-12
Attending: INTERNAL MEDICINE
Payer: COMMERCIAL

## 2025-08-12 DIAGNOSIS — C34.32 MALIGNANT NEOPLASM OF LOWER LOBE, LEFT BRONCHUS OR LUNG (H): ICD-10-CM

## 2025-08-12 PROCEDURE — 71250 CT THORAX DX C-: CPT

## 2025-08-19 ENCOUNTER — TRANSFERRED RECORDS (OUTPATIENT)
Dept: HEALTH INFORMATION MANAGEMENT | Facility: CLINIC | Age: 70
End: 2025-08-19
Payer: COMMERCIAL

## (undated) DEVICE — LUBRICANT INST ELECTROLUBE EL101

## (undated) DEVICE — SU MONOCRYL 4-0 PS-2 27" UND Y426H

## (undated) DEVICE — TUBING FILTER TRI-LUMEN AIRSEAL ASC-EVAC1

## (undated) DEVICE — ESU ELEC BLADE E-SEP INSULATED NEPTUNE 70MM 0703-070-002

## (undated) DEVICE — DAVINCI XI REDUCER 8-12MM 470381

## (undated) DEVICE — POUCH TISSUE RETRIEVAL 15MM 6.00" INTRO TRS190SB2

## (undated) DEVICE — ESU ELEC BLADE 6" COATED/INSULATED E1455-6

## (undated) DEVICE — POUCH TISSUE RETRIEVAL ROBOTIC 12MM 5.5" INTRO TRS-ROBO-12

## (undated) DEVICE — DAVINCI XI ENDOWRIST STAPLER RELOAD 30MM GREEN 48630G

## (undated) DEVICE — SYR 30ML LL W/O NDL 302832

## (undated) DEVICE — ENDO DISSECTOR KITTNER CIGARETTE ROLL4"X4" 15505/25

## (undated) DEVICE — DAVINCI XI SEAL UNIVERSAL 5-8MM 470361

## (undated) DEVICE — DAVINCI XI ENDOWRIST STAPLER RELOAD 30MM WHITE 48630W

## (undated) DEVICE — DRAPE MAYO STAND 23X54 8337

## (undated) DEVICE — DAVINCI XI DRAPE COLUMN 470341

## (undated) DEVICE — SU SILK 0 TIE 6X30" A306H

## (undated) DEVICE — DRSG PRIMAPORE 02X3" 7133

## (undated) DEVICE — DAVINCI XI ESU BIPOLAR LONG 78DEG ANG ENDOWRIST EXT 471400

## (undated) DEVICE — PACK GOWN 3/PK DISP XL SBA32GPFCB

## (undated) DEVICE — DRAPE IOBAN INCISE 23X17" 6650EZ

## (undated) DEVICE — DAVINCI SEAL CANNULA AND STPL 12MM 470380

## (undated) DEVICE — DAVINCI ENDOWRIST STAPLER 45 SHEATH 410370

## (undated) DEVICE — DAVINCI XI FCP CADIERE 8MM ENDOWRIST 471049

## (undated) DEVICE — STPL DAVINCI SUREFORM 45MM RELOAD GREEN 48345G

## (undated) DEVICE — DAVINCI XI OBTURATOR BLADELESS 8MM 470359

## (undated) DEVICE — SU VICRYL 0 UR-6 27" J603H

## (undated) DEVICE — SYSTEM LAPAROVUE VISIBILITY LAPVUE10

## (undated) DEVICE — SURGICEL ABSORBABLE HEMOSTAT SNOW 4"X4" 2083

## (undated) DEVICE — ESU PENCIL SMOKE EVAC W/ROCKER SWITCH 0703-047-000

## (undated) DEVICE — Device

## (undated) DEVICE — DRAPE SHEET MED 44X70" 9355

## (undated) DEVICE — SU VICRYL 2-0 SH 27" UND J417H

## (undated) DEVICE — DAVINCI XI DRAPE ARM 470015

## (undated) DEVICE — DRAIN CHEST TUBE 28FR STR 8028

## (undated) DEVICE — TUBING SUCTION MEDI-VAC SOFT 3/16"X20' N520A

## (undated) DEVICE — LINEN TOWEL PACK X6 WHITE 5487

## (undated) DEVICE — SU VICRYL 2-0 CT-1 27" UND J259H

## (undated) DEVICE — STPL DAVINCI SUREFORM 45MM STR TIPRELOAD 12FIRE 480445

## (undated) DEVICE — TUBING SUCTION 10'X3/16" N510

## (undated) DEVICE — PREP CHLORAPREP 26ML TINTED HI-LITE ORANGE 930815

## (undated) DEVICE — DAVINCI XI GRASPER FENESTRATED TIP UP 8MM 470347

## (undated) DEVICE — ENDO TROCAR CONMED AIRSEAL BLADELESS 12X120MM IAS12-120LP

## (undated) DEVICE — ADH SKIN CLOSURE PREMIERPRO EXOFIN 1.0ML 3470

## (undated) DEVICE — SUCTION DRY CHEST DRAIN OASIS 3600-100

## (undated) DEVICE — ANTIFOG SOLUTION W/FOAM PAD 31142527

## (undated) DEVICE — SOL WATER IRRIG 1000ML BOTTLE 2F7114

## (undated) DEVICE — ESU GROUND PAD ADULT W/CORD E7507

## (undated) DEVICE — LINEN TOWEL PACK X5 5464

## (undated) DEVICE — STPL DAVINCI SUREFORM 45MM RELOAD BLUE 48345B

## (undated) DEVICE — GLOVE BIOGEL PI MICRO SZ 6.0 48560

## (undated) DEVICE — LUBRICANT INST KIT ENDO-LUBE 220-90

## (undated) DEVICE — SUCTION MANIFOLD NEPTUNE 2 SYS 4 PORT 0702-020-000

## (undated) DEVICE — SU SILK 0 SH 30" K834H

## (undated) RX ORDER — CEFAZOLIN SODIUM/WATER 2 G/20 ML
SYRINGE (ML) INTRAVENOUS
Status: DISPENSED
Start: 2023-03-24

## (undated) RX ORDER — HYDROMORPHONE HYDROCHLORIDE 1 MG/ML
INJECTION, SOLUTION INTRAMUSCULAR; INTRAVENOUS; SUBCUTANEOUS
Status: DISPENSED
Start: 2023-03-24

## (undated) RX ORDER — EPHEDRINE SULFATE 50 MG/ML
INJECTION, SOLUTION INTRAMUSCULAR; INTRAVENOUS; SUBCUTANEOUS
Status: DISPENSED
Start: 2023-03-24

## (undated) RX ORDER — ENOXAPARIN SODIUM 100 MG/ML
INJECTION SUBCUTANEOUS
Status: DISPENSED
Start: 2023-03-24

## (undated) RX ORDER — DEXAMETHASONE SODIUM PHOSPHATE 4 MG/ML
INJECTION, SOLUTION INTRA-ARTICULAR; INTRALESIONAL; INTRAMUSCULAR; INTRAVENOUS; SOFT TISSUE
Status: DISPENSED
Start: 2023-03-24

## (undated) RX ORDER — CHLORHEXIDINE GLUCONATE ORAL RINSE 1.2 MG/ML
SOLUTION DENTAL
Status: DISPENSED
Start: 2023-03-24

## (undated) RX ORDER — CALCIUM CHLORIDE 100 MG/ML
INJECTION INTRAVENOUS; INTRAVENTRICULAR
Status: DISPENSED
Start: 2023-03-24

## (undated) RX ORDER — FENTANYL CITRATE 50 UG/ML
INJECTION, SOLUTION INTRAMUSCULAR; INTRAVENOUS
Status: DISPENSED
Start: 2022-10-05

## (undated) RX ORDER — ONDANSETRON 2 MG/ML
INJECTION INTRAMUSCULAR; INTRAVENOUS
Status: DISPENSED
Start: 2023-03-24

## (undated) RX ORDER — LIDOCAINE HYDROCHLORIDE 10 MG/ML
INJECTION, SOLUTION EPIDURAL; INFILTRATION; INTRACAUDAL; PERINEURAL
Status: DISPENSED
Start: 2022-10-05

## (undated) RX ORDER — KETOROLAC TROMETHAMINE 30 MG/ML
INJECTION, SOLUTION INTRAMUSCULAR; INTRAVENOUS
Status: DISPENSED
Start: 2023-03-24

## (undated) RX ORDER — GABAPENTIN 100 MG/1
CAPSULE ORAL
Status: DISPENSED
Start: 2023-03-24

## (undated) RX ORDER — FENTANYL CITRATE-0.9 % NACL/PF 10 MCG/ML
PLASTIC BAG, INJECTION (ML) INTRAVENOUS
Status: DISPENSED
Start: 2023-03-24

## (undated) RX ORDER — PROPOFOL 10 MG/ML
INJECTION, EMULSION INTRAVENOUS
Status: DISPENSED
Start: 2023-03-24

## (undated) RX ORDER — SODIUM CHLORIDE 9 MG/ML
INJECTION, SOLUTION INTRAVENOUS
Status: DISPENSED
Start: 2022-10-05

## (undated) RX ORDER — HYDROMORPHONE HCL IN WATER/PF 6 MG/30 ML
PATIENT CONTROLLED ANALGESIA SYRINGE INTRAVENOUS
Status: DISPENSED
Start: 2023-03-24

## (undated) RX ORDER — GLYCOPYRROLATE 0.2 MG/ML
INJECTION, SOLUTION INTRAMUSCULAR; INTRAVENOUS
Status: DISPENSED
Start: 2023-03-24

## (undated) RX ORDER — FENTANYL CITRATE 50 UG/ML
INJECTION, SOLUTION INTRAMUSCULAR; INTRAVENOUS
Status: DISPENSED
Start: 2023-03-24

## (undated) RX ORDER — ACETAMINOPHEN 325 MG/1
TABLET ORAL
Status: DISPENSED
Start: 2023-03-24

## (undated) RX ORDER — ESMOLOL HYDROCHLORIDE 10 MG/ML
INJECTION INTRAVENOUS
Status: DISPENSED
Start: 2023-03-24

## (undated) RX ORDER — FENTANYL CITRATE 50 UG/ML
INJECTION, SOLUTION INTRAMUSCULAR; INTRAVENOUS
Status: DISPENSED
Start: 2017-01-30

## (undated) RX ORDER — CELECOXIB 200 MG/1
CAPSULE ORAL
Status: DISPENSED
Start: 2023-03-24

## (undated) RX ORDER — BUPIVACAINE HYDROCHLORIDE 2.5 MG/ML
INJECTION, SOLUTION EPIDURAL; INFILTRATION; INTRACAUDAL
Status: DISPENSED
Start: 2023-03-24